# Patient Record
Sex: FEMALE | Race: BLACK OR AFRICAN AMERICAN | Employment: FULL TIME | URBAN - METROPOLITAN AREA
[De-identification: names, ages, dates, MRNs, and addresses within clinical notes are randomized per-mention and may not be internally consistent; named-entity substitution may affect disease eponyms.]

---

## 2021-07-02 LAB
ANTIBODY SCREEN, EXTERNAL: NEGATIVE
HBSAG, EXTERNAL: NONREACTIVE
HEPATITIS C AB,   EXT: NONREACTIVE
HIV, EXTERNAL: NONREACTIVE
RUBELLA, EXTERNAL: NORMAL
T. PALLIDUM, EXTERNAL: NEGATIVE
TYPE, ABO & RH, EXTERNAL: NORMAL

## 2021-09-02 LAB
CHLAMYDIA, EXTERNAL: NEGATIVE
N. GONORRHEA, EXTERNAL: NEGATIVE

## 2021-11-02 ENCOUNTER — HOSPITAL ENCOUNTER (EMERGENCY)
Age: 36
End: 2021-11-02
Attending: OBSTETRICS & GYNECOLOGY | Admitting: OBSTETRICS & GYNECOLOGY

## 2021-11-02 ENCOUNTER — HOSPITAL ENCOUNTER (EMERGENCY)
Age: 36
Discharge: HOME | End: 2021-11-02
Attending: OBSTETRICS & GYNECOLOGY | Admitting: OBSTETRICS & GYNECOLOGY
Payer: COMMERCIAL

## 2021-11-02 VITALS
SYSTOLIC BLOOD PRESSURE: 151 MMHG | TEMPERATURE: 98.7 F | HEART RATE: 122 BPM | BODY MASS INDEX: 35.68 KG/M2 | WEIGHT: 189 LBS | RESPIRATION RATE: 17 BRPM | DIASTOLIC BLOOD PRESSURE: 94 MMHG | HEIGHT: 61 IN

## 2021-11-02 PROBLEM — O16.9 HYPERTENSION AFFECTING PREGNANCY: Status: ACTIVE | Noted: 2021-11-02

## 2021-11-02 LAB
ALBUMIN SERPL-MCNC: 3.1 G/DL (ref 3.5–5)
ALBUMIN/GLOB SERPL: 0.9 {RATIO} (ref 1.1–2.2)
ALP SERPL-CCNC: 98 U/L (ref 45–117)
ALT SERPL-CCNC: 18 U/L (ref 12–78)
ANION GAP SERPL CALC-SCNC: 7 MMOL/L (ref 5–15)
AST SERPL-CCNC: 10 U/L (ref 15–37)
BILIRUB SERPL-MCNC: 0.5 MG/DL (ref 0.2–1)
BUN SERPL-MCNC: 3 MG/DL (ref 6–20)
BUN/CREAT SERPL: 6 (ref 12–20)
CALCIUM SERPL-MCNC: 9.9 MG/DL (ref 8.5–10.1)
CHLORIDE SERPL-SCNC: 106 MMOL/L (ref 97–108)
CO2 SERPL-SCNC: 24 MMOL/L (ref 21–32)
CREAT SERPL-MCNC: 0.48 MG/DL (ref 0.55–1.02)
CREAT UR-MCNC: 14.2 MG/DL
ERYTHROCYTE [DISTWIDTH] IN BLOOD BY AUTOMATED COUNT: 13.1 % (ref 11.5–14.5)
GLOBULIN SER CALC-MCNC: 3.4 G/DL (ref 2–4)
GLUCOSE SERPL-MCNC: 134 MG/DL (ref 65–100)
HCT VFR BLD AUTO: 34.9 % (ref 35–47)
HGB BLD-MCNC: 11.7 G/DL (ref 11.5–16)
MCH RBC QN AUTO: 30.2 PG (ref 26–34)
MCHC RBC AUTO-ENTMCNC: 33.5 G/DL (ref 30–36.5)
MCV RBC AUTO: 89.9 FL (ref 80–99)
NRBC # BLD: 0 K/UL (ref 0–0.01)
NRBC BLD-RTO: 0 PER 100 WBC
PLATELET # BLD AUTO: 361 K/UL (ref 150–400)
PMV BLD AUTO: 9.8 FL (ref 8.9–12.9)
POTASSIUM SERPL-SCNC: 3.3 MMOL/L (ref 3.5–5.1)
PROT SERPL-MCNC: 6.5 G/DL (ref 6.4–8.2)
PROT UR-MCNC: 6 MG/DL (ref 0–11.9)
PROT/CREAT UR-RTO: 0.4
RBC # BLD AUTO: 3.88 M/UL (ref 3.8–5.2)
SODIUM SERPL-SCNC: 137 MMOL/L (ref 136–145)
WBC # BLD AUTO: 10 K/UL (ref 3.6–11)

## 2021-11-02 PROCEDURE — 85027 COMPLETE CBC AUTOMATED: CPT

## 2021-11-02 PROCEDURE — 36415 COLL VENOUS BLD VENIPUNCTURE: CPT

## 2021-11-02 PROCEDURE — 84156 ASSAY OF PROTEIN URINE: CPT

## 2021-11-02 PROCEDURE — 74011250637 HC RX REV CODE- 250/637: Performed by: OBSTETRICS & GYNECOLOGY

## 2021-11-02 PROCEDURE — 99285 EMERGENCY DEPT VISIT HI MDM: CPT

## 2021-11-02 PROCEDURE — 80053 COMPREHEN METABOLIC PANEL: CPT

## 2021-11-02 RX ORDER — LABETALOL HYDROCHLORIDE 5 MG/ML
20 INJECTION, SOLUTION INTRAVENOUS ONCE
Status: DISCONTINUED | OUTPATIENT
Start: 2021-11-02 | End: 2021-11-02 | Stop reason: HOSPADM

## 2021-11-02 RX ORDER — NIFEDIPINE 30 MG/1
60 TABLET, EXTENDED RELEASE ORAL DAILY
COMMUNITY
End: 2021-11-02

## 2021-11-02 RX ORDER — NIFEDIPINE 90 MG/1
90 TABLET, EXTENDED RELEASE ORAL DAILY
Qty: 30 TABLET | Refills: 2 | Status: SHIPPED
Start: 2021-11-02 | End: 2021-11-30

## 2021-11-02 RX ORDER — GUAIFENESIN 100 MG/5ML
81 LIQUID (ML) ORAL DAILY
COMMUNITY
End: 2022-01-03

## 2021-11-02 RX ORDER — ACETAMINOPHEN 325 MG/1
650 TABLET ORAL
COMMUNITY
End: 2021-11-30

## 2021-11-02 RX ORDER — LORATADINE AND PSEUDOEPHEDRINE SULFATE 10; 240 MG/1; MG/1
1 TABLET, EXTENDED RELEASE ORAL DAILY
COMMUNITY
End: 2022-01-03

## 2021-11-02 RX ORDER — NIFEDIPINE 30 MG/1
30 TABLET, EXTENDED RELEASE ORAL
Status: COMPLETED | OUTPATIENT
Start: 2021-11-02 | End: 2021-11-02

## 2021-11-02 RX ADMIN — NIFEDIPINE 30 MG: 30 TABLET, FILM COATED, EXTENDED RELEASE ORAL at 13:25

## 2021-11-02 NOTE — PROGRESS NOTES
Labs reviewed and look good (ur pr/cr ratio at baseline). BP have been labile, some normal, most mild range, few severe range which then return to mild range. Pt denies HA or visual changed. Will add 30mg of Procardia XL to regimen in form of now dose. Continue to monitor BPs. Janelle Donis MD    BPs improved on procardia. Discharged home with f/u later this week in the office.

## 2021-11-02 NOTE — PROGRESS NOTES
1705 pt discharged home at this time. Instructions given on medication change and follow up. Pt states understanding.

## 2021-11-02 NOTE — PROGRESS NOTES
1226 Patient arrived for monitoring b/c of high blood pressures noted at Dr. Jimmy Kirkpatrick office. 1325 Per Dr Magalie Stevenson, administering 30mg additional nifedipine for elevated BP    1515 Notified Dr. Magalie Stevenson of contractions present. BP's continue to be unpredictable. 1630 Dr Mejias Sensor bedside. Plan is to increase Procardia dosage and recheck BP in office in 48hr. Patient to be discharged.

## 2021-11-02 NOTE — DISCHARGE INSTRUCTIONS
Patient Education   Patient Education        Preeclampsia: Care Instructions  Overview     Preeclampsia occurs when a woman's blood pressure rises during pregnancy. Often with preeclampsia, you also have swelling in your legs, hands, and face. A test may show too much protein in your urine. If preeclampsia is severe and not treated, it can lead to seizures (eclampsia) and damage to your liver or kidneys. Preeclampsia can prevent your baby from getting enough food and oxygen. This can cause a low birth weight or other problems. Your doctor will watch you closely to prevent these problems. Your doctor also may recommend that you reduce your activity. If your preeclampsia is a danger to your health or the health of your baby, your doctor may need to deliver your baby early. While preeclampsia is a concern, most women with preeclampsia have healthy babies. After a woman gives birth, preeclampsia usually goes away on its own. But symptoms may last a few weeks or more and can get worse after delivery. Rarely, symptoms of preeclampsia don't show up until days or even weeks after childbirth. Follow-up care is a key part of your treatment and safety. Be sure to make and go to all appointments, and call your doctor if you are having problems. It's also a good idea to know your test results and keep a list of the medicines you take. How can you care for yourself at home? · Take and record your blood pressure at home if your doctor tells you to. ? Ask your doctor to check your blood pressure monitor to be sure that it is accurate and that the cuff fits you. Also ask your doctor to watch you to make sure that you are using it right. ? You should not eat, use tobacco products, or use medicine known to raise blood pressure (such as some nasal decongestant sprays) before you take your blood pressure. ? Avoid taking your blood pressure if you have just exercised. Also avoid taking it if you are nervous or upset.  Rest at least 15 minutes before you take your blood pressure. · You may need to take medicine to manage your blood pressure. Take your medicines exactly as prescribed. Call your doctor if you think you are having a problem with your medicine. · Do not smoke. Quitting smoking will help improve your baby's growth and health. If you need help quitting, talk to your doctor about stop-smoking programs and medicines. These can increase your chances of quitting for good. · Eat a balanced and healthy diet that has lots of fruits and vegetables. · You can keep track of your baby's health by checking your baby's movement. A common method for this is to note the length of time it takes to count 10 movements (such as kicks, flutters, or rolls). Call your doctor if you don't feel at least 10 movements in a 2-hour period. Track your baby's movements once each day. Bring this record with you to each prenatal visit. When should you call for help? Share this information with your partner or a friend. They can help you watch for warning signs. Call 911  anytime you think you may need emergency care. For example, call if:    · You passed out (lost consciousness).     · You have a seizure. Call your doctor now or seek immediate medical care if:    · You have symptoms of preeclampsia, such as:  ? Sudden swelling of your face, hands, or feet. ? New vision problems (such as dimness, blurring, or seeing spots). ? A severe headache.     · Your blood pressure is very high, such as 160/110 or higher.     · Your blood pressure is higher than your doctor told you it should be, or it rises quickly.     · You have new nausea or vomiting.     · You think that you are in labor.     · You have pain in your belly or pelvis. Watch closely for changes in your health, and be sure to contact your doctor if:    · You gain weight rapidly. Where can you learn more?   Go to http://www.gray.com/  Enter Z954 in the search box to learn more about \"Preeclampsia: Care Instructions. \"  Current as of: June 16, 2021               Content Version: 13.0  © 8795-1503 [employer]. Care instructions adapted under license by [employer] (which disclaims liability or warranty for this information). If you have questions about a medical condition or this instruction, always ask your healthcare professional. [address] any warranty or liability for your use of this information. High Blood Pressure in Pregnancy: Care Instructions  Overview     High blood pressure (hypertension) means that the force of blood against your artery walls is too strong. High blood pressure problems during pregnancy include:  · Chronic hypertension. This is high blood pressure that starts before pregnancy. · Gestational hypertension. This is high blood pressure that starts in the second or third trimester of pregnancy. · Preeclampsia. This is a problem that includes high blood pressure and signs of organ injury during pregnancy. In some cases, it leads to eclampsia. Eclampsia causes seizures. High blood pressure during pregnancy can affect the amount of oxygen and nutrients your baby receives. This can affect how your baby grows. High blood pressure can also cause other serious problems for both you and your baby, such as placental abruption. To prevent problems, you and your baby will be watched very closely. You will have to check your blood pressure often during pregnancy and possibly after pregnancy. If your blood pressure rises suddenly or is very high during your pregnancy, your doctor may prescribe medicines. They can usually control blood pressure. If your blood pressure affects your or your baby's health, your doctor may need to deliver your baby early. After your baby is born, your blood pressure will probably improve. But sometimes blood pressure problems continue after birth.   Follow-up care is a key part of your treatment and safety. Be sure to make and go to all appointments, and call your doctor if you are having problems. It's also a good idea to know your test results and keep a list of the medicines you take. How can you care for yourself at home? · Take and write down your blood pressure at home if your doctor says to. · Take your medicines exactly as prescribed. Call your doctor if you think you are having a problem with your medicine. · Do not smoke. This is one of the best things you can do to help your baby be healthy. If you need help quitting, talk to your doctor about stop-smoking programs and medicines. These can increase your chances of quitting for good. · Don't gain too much weight during pregnancy. Talk to your doctor about how much weight gain is healthy. · Eat a healthy diet. · If your doctor says it's okay, get regular exercise. Walking or swimming several times a week can be healthy for you and your baby. · Reduce stress, and find time to relax. This is very important if you continue to work or have a busy schedule. It's also important if you have small children at home. When should you call for help? Share this information with your partner or a friend. They can help you watch for warning signs. Call 911  anytime you think you may need emergency care. For example, call if:    · You passed out (lost consciousness).     · You have a seizure. Call your doctor now or seek immediate medical care if:    · You have symptoms of preeclampsia, such as:  ? Sudden swelling of your face, hands, or feet. ? New vision problems (such as dimness, blurring, or seeing spots). ? A severe headache.     · Your blood pressure is very high, such as 160/110 or higher.     · Your blood pressure is higher than your doctor told you it should be, or it rises quickly.     · You have new nausea or vomiting.     · You think that you are in labor.     · You have pain in your belly or pelvis.    Watch closely for changes in your health, and be sure to contact your doctor if:    · You gain weight rapidly. Where can you learn more? Go to http://www.gray.com/  Enter A052 in the search box to learn more about \"High Blood Pressure in Pregnancy: Care Instructions. \"  Current as of: June 16, 2021               Content Version: 13.0  © 4275-9921 [employer]. Care instructions adapted under license by [email or web address] (which disclaims liability or warranty for this information). If you have questions about a medical condition or this instruction, always ask your healthcare professional. [place] [address] any warranty or liability for your use of this information.

## 2021-11-02 NOTE — H&P
History & Physical    Name: Mindy Vicente MRN: [id number]  SSN: [id number]    YOB: 1985  Age: 31 y.o. Sex: female      Subjective:     Reason for Admission:  Pregnancy and chronic hypertension with elevated BPs in the office    History of Present Illness: Mindy Vicente is a 31 y.o.  with an estimated gestational age of 29 5/7 weeks. She was seen in the office today for her routine prenatal visit and was found to have BPs that were higher than normal for her. She denies HA, visual changes or RUQ discomfort. She was diagnosed with CHTN earlier in pregnancy and was started on procardia, currently on Procardia XL 60mg daily. She also takes a baby ASA. Her baseline labs were abnormal at 16 wks (urine pr/cr ratio 0.44 at baseline). She had an ultrasound yesterday which demonstrated normal fetal growth and a BPP 8/8, cephalic presentation. She presents today for repeat labs and BPs and r/o SI PreE. She notes active FM. She has no other pregnancy issues. She is covid vaccinated. OB History    Para Term  AB Living   1             SAB TAB Ectopic Molar Multiple Live Births                    # Outcome Date GA Lbr Jose/2nd Weight Sex Delivery Anes PTL Lv   1 Current              Past Medical History:   Diagnosis Date    Essential hypertension     Gestational hypertension      No past surgical history on file. Social History     Occupational History    Not on file   Tobacco Use    Smoking status: Never Smoker    Smokeless tobacco: Never Used   Vaping Use    Vaping Use: Never used   Substance and Sexual Activity    Alcohol use: Not Currently     Alcohol/week: 0.0 - 0.8 standard drinks    Drug use: No    Sexual activity: Yes     No family history on file. Allergies   Allergen Reactions    Sulfa (Sulfonamide Antibiotics) Hives, Itching and Myalgia     Prior to Admission medications    Medication Sig Start Date End Date Taking?  Authorizing Provider NIFEdipine ER (Procardia XL) 30 mg ER tablet Take 60 mg by mouth daily. Indications: high blood pressure, pre-existing hypertension during pregnancy   Yes Provider, Historical   loratadine-pseudoephedrine (Claritin-D 24 Hour)  mg per tablet Take 1 Tablet by mouth daily. Indications: seasonal runny nose   Yes Provider, Historical   aspirin 81 mg chewable tablet Take 81 mg by mouth daily. Indications: treatment to prevent peripheral artery thromboembolism   Yes Provider, Historical   acetaminophen (TylenoL) 325 mg tablet Take 650 mg by mouth every four (4) hours as needed for Pain. Indications: headache   Yes Provider, Historical   PNV Comb #2-Iron-FA-Omega 3 29-1-400 mg cmpk Take  by mouth. Yes Provider, Historical   norethindrone-ethinyl estradiol (GILDESS FE) 1 mg-20 mcg (21)/75 mg (7) per tablet Take 1 Tab by mouth daily. Patient not taking: Reported on 2021 6/12/15   Myra Ram MD   clindamycin-benzoyl peroxide (BENZACLIN) 1-5 % topical gel Apply  to affected area two (2) times a day. Apply to affected area after the skin has been cleansed and dried.   Patient not taking: Reported on 2021   Myra Ram MD        Review of Systems   As noted in HPI    Objective:     Vitals:    Vitals:    21 1126 21 1127 21 1136 21 1146   BP: (!) 156/99  (!) 152/85 (!) 131/94   Pulse: (!) 115      Resp: 17      Temp: 98.7 °F (37.1 °C)      Weight:  85.7 kg (189 lb)     Height:  5' 1\" (1.549 m)        Temp (24hrs), Av.7 °F (37.1 °C), Min:98.7 °F (37.1 °C), Max:98.7 °F (37.1 °C)    BP  Min: 131/94  Max: 156/99     Physical Exam  Gen: NAD  Resp: non-labored  CV: well perfused  Abd: soft, gravid, non-tender  Ext: 2+ reflexes, no clonus, no edema       Baseline  140, moderate variability, + accels, no decels - REACTIVE  [place]: no contractions      Labs:   Recent Results (from the past 24 hour(s))   CBC W/O DIFF    Collection Time: 21 11:39 AM   Result Value Ref Range WBC 10.0 3.6 - 11.0 K/uL    RBC 3.88 3.80 - 5.20 M/uL    HGB 11.7 11.5 - 16.0 g/dL    HCT 34.9 (L) 35.0 - 47.0 %    MCV 89.9 80.0 - 99.0 FL    MCH 30.2 26.0 - 34.0 PG    MCHC 33.5 30.0 - 36.5 g/dL    RDW 13.1 11.5 - 14.5 %    PLATELET 996 458 - 049 K/uL    MPV 9.8 8.9 - 12.9 FL    NRBC 0.0 0  WBC    ABSOLUTE NRBC 0.00 0.00 - 0.01 K/uL       Assessment and Plan:     G1 28 5/7 with CHTN and elevated BPs in the office, here to r/o SI PreE    - send PreE labs (baseline urine pr/cr ratio elevated at 16 wks 0.44)  - serial BPs  - NST  - monitor for si/sx of SI PreE, currently no sxs  - await return of labs    Molly Ambriz MD

## 2021-11-05 ENCOUNTER — HOSPITAL ENCOUNTER (INPATIENT)
Age: 36
LOS: 20 days | Discharge: HOME | DRG: 832 | End: 2021-11-30
Attending: OBSTETRICS & GYNECOLOGY | Admitting: OBSTETRICS & GYNECOLOGY
Payer: COMMERCIAL

## 2021-11-05 DIAGNOSIS — O99.213 OBESITY AFFECTING PREGNANCY IN THIRD TRIMESTER: ICD-10-CM

## 2021-11-05 DIAGNOSIS — Z3A.29 29 WEEKS GESTATION OF PREGNANCY: Primary | ICD-10-CM

## 2021-11-05 DIAGNOSIS — O24.410 DIET CONTROLLED GESTATIONAL DIABETES MELLITUS (GDM) IN THIRD TRIMESTER: ICD-10-CM

## 2021-11-05 DIAGNOSIS — O10.919 CHRONIC HYPERTENSION AFFECTING PREGNANCY: ICD-10-CM

## 2021-11-05 LAB
ALBUMIN SERPL-MCNC: 3.3 G/DL (ref 3.5–5)
ALBUMIN/GLOB SERPL: 0.8 {RATIO} (ref 1.1–2.2)
ALP SERPL-CCNC: 107 U/L (ref 45–117)
ALT SERPL-CCNC: 29 U/L (ref 12–78)
ANION GAP SERPL CALC-SCNC: 12 MMOL/L (ref 5–15)
AST SERPL-CCNC: 19 U/L (ref 15–37)
BILIRUB SERPL-MCNC: 0.5 MG/DL (ref 0.2–1)
BUN SERPL-MCNC: 5 MG/DL (ref 6–20)
BUN/CREAT SERPL: 10 (ref 12–20)
CALCIUM SERPL-MCNC: 10 MG/DL (ref 8.5–10.1)
CHLORIDE SERPL-SCNC: 103 MMOL/L (ref 97–108)
CO2 SERPL-SCNC: 22 MMOL/L (ref 21–32)
CREAT SERPL-MCNC: 0.5 MG/DL (ref 0.55–1.02)
CREAT UR-MCNC: 14.5 MG/DL
ERYTHROCYTE [DISTWIDTH] IN BLOOD BY AUTOMATED COUNT: 13.1 % (ref 11.5–14.5)
GLOBULIN SER CALC-MCNC: 3.9 G/DL (ref 2–4)
GLUCOSE SERPL-MCNC: 104 MG/DL (ref 65–100)
HCT VFR BLD AUTO: 35.9 % (ref 35–47)
HGB BLD-MCNC: 12.1 G/DL (ref 11.5–16)
MCH RBC QN AUTO: 30.3 PG (ref 26–34)
MCHC RBC AUTO-ENTMCNC: 33.7 G/DL (ref 30–36.5)
MCV RBC AUTO: 89.8 FL (ref 80–99)
NRBC # BLD: 0 K/UL (ref 0–0.01)
NRBC BLD-RTO: 0 PER 100 WBC
PLATELET # BLD AUTO: 393 K/UL (ref 150–400)
PMV BLD AUTO: 9.9 FL (ref 8.9–12.9)
POTASSIUM SERPL-SCNC: 3.2 MMOL/L (ref 3.5–5.1)
PROT SERPL-MCNC: 7.2 G/DL (ref 6.4–8.2)
PROT UR-MCNC: 10 MG/DL (ref 0–11.9)
PROT/CREAT UR-RTO: 0.7
RBC # BLD AUTO: 4 M/UL (ref 3.8–5.2)
SODIUM SERPL-SCNC: 137 MMOL/L (ref 136–145)
WBC # BLD AUTO: 10.9 K/UL (ref 3.6–11)

## 2021-11-05 PROCEDURE — 99282 EMERGENCY DEPT VISIT SF MDM: CPT

## 2021-11-05 PROCEDURE — 84156 ASSAY OF PROTEIN URINE: CPT

## 2021-11-05 PROCEDURE — 85027 COMPLETE CBC AUTOMATED: CPT

## 2021-11-05 PROCEDURE — 36415 COLL VENOUS BLD VENIPUNCTURE: CPT

## 2021-11-05 PROCEDURE — 80053 COMPREHEN METABOLIC PANEL: CPT

## 2021-11-06 PROBLEM — Z3A.29 29 WEEKS GESTATION OF PREGNANCY: Status: ACTIVE | Noted: 2021-11-06

## 2021-11-06 PROBLEM — O10.919 CHRONIC HYPERTENSION AFFECTING PREGNANCY: Status: ACTIVE | Noted: 2021-11-06

## 2021-11-06 PROCEDURE — G0378 HOSPITAL OBSERVATION PER HR: HCPCS

## 2021-11-06 PROCEDURE — 74011250637 HC RX REV CODE- 250/637: Performed by: ADVANCED PRACTICE MIDWIFE

## 2021-11-06 PROCEDURE — 74011250637 HC RX REV CODE- 250/637: Performed by: OBSTETRICS & GYNECOLOGY

## 2021-11-06 RX ORDER — GUAIFENESIN 100 MG/5ML
LIQUID (ML) ORAL
Status: DISPENSED
Start: 2021-11-06 | End: 2021-11-06

## 2021-11-06 RX ORDER — SODIUM CHLORIDE 0.9 % (FLUSH) 0.9 %
5-40 SYRINGE (ML) INJECTION AS NEEDED
Status: DISCONTINUED | OUTPATIENT
Start: 2021-11-06 | End: 2021-11-06

## 2021-11-06 RX ORDER — NIFEDIPINE 30 MG/1
90 TABLET, EXTENDED RELEASE ORAL DAILY
Status: DISCONTINUED | OUTPATIENT
Start: 2021-11-06 | End: 2021-11-09

## 2021-11-06 RX ORDER — GUAIFENESIN 100 MG/5ML
81 LIQUID (ML) ORAL DAILY
Status: DISCONTINUED | OUTPATIENT
Start: 2021-11-06 | End: 2021-11-30 | Stop reason: HOSPADM

## 2021-11-06 RX ORDER — ACETAMINOPHEN 325 MG/1
650 TABLET ORAL
Status: DISCONTINUED | OUTPATIENT
Start: 2021-11-06 | End: 2021-11-30 | Stop reason: HOSPADM

## 2021-11-06 RX ORDER — LABETALOL 200 MG/1
200 TABLET, FILM COATED ORAL 2 TIMES DAILY
Status: DISCONTINUED | OUTPATIENT
Start: 2021-11-06 | End: 2021-11-09

## 2021-11-06 RX ORDER — SODIUM CHLORIDE 0.9 % (FLUSH) 0.9 %
5-40 SYRINGE (ML) INJECTION EVERY 8 HOURS
Status: DISCONTINUED | OUTPATIENT
Start: 2021-11-06 | End: 2021-11-12

## 2021-11-06 RX ADMIN — ASPIRIN 81 MG CHEWABLE TABLET 81 MG: 81 TABLET CHEWABLE at 06:41

## 2021-11-06 RX ADMIN — LABETALOL HYDROCHLORIDE 200 MG: 200 TABLET, FILM COATED ORAL at 12:20

## 2021-11-06 RX ADMIN — NIFEDIPINE 90 MG: 30 TABLET, FILM COATED, EXTENDED RELEASE ORAL at 06:42

## 2021-11-06 RX ADMIN — LABETALOL HYDROCHLORIDE 200 MG: 200 TABLET, FILM COATED ORAL at 18:48

## 2021-11-06 NOTE — PROGRESS NOTES
1600 Verbal shift change report given to Charanjit Ramirez RN (oncoming nurse) by Panfilo Lee RN (offgoing nurse). Report included the following information SBAR.

## 2021-11-06 NOTE — PROGRESS NOTES
Ante Partum Progress Note    Dana Woody  [id number]    Assessment: 32 y/o  with IUP at 29w2d with CHTN and SI preE w/o SF. 1. CHTN with SI PreE w/o SF  -Labile BPs with increase in requirement for antihypertensives. -Currently on procardia 90 xl daily  -No preE sx and labs stable except increase in proteinuria (baseline p/c .44, now .7)  -Will add labetalol 200 mg bid  -Long discussion about dx  -We discussed inpatient admission with daily labs while adjusting BP meds with plan for betamethasone for [facility] tomorrow (after 3 hour GTT) and MFM consult on Monday. Patient and her  strongly desire to go home so we reviewed that if BPs mild range and she remains asymptomatic we could consider d/c today with office MFM visit on Monday and plan outpatient corticosteroids. We discussed strong likelihood of  delivery given what her BPs have been doing and we reviewed implications of this in detail. After careful discussion, mutual decision was made to stay in house with plan as above. 2. IUP at 29 2/7 wks  -Normal growth on  with reassuring fetal surveillance  -NST bid  -Recommend BMZ after GTT    3. Failed 1 hour GTT  -3 hour GTT to be done    4. BMI 35      Orders/Charges: High and Non Stress Test    Patient states she is feeling well. She denies HA, visual changes, RUQ pain, ctx, vb, lof. +FM.      Vitals:  Visit Vitals  BP (!) 153/108 (BP 1 Location: Right upper arm, BP Patient Position: At rest)   Pulse (!) 101   Temp 98.3 °F (36.8 °C)   Resp 18     Temp (24hrs), Av.5 °F (36.9 °C), Min:98.3 °F (36.8 °C), Max:98.9 °F (37.2 °C)      Last 24hr Input/Output:  No intake or output data in the 24 hours ending 21 1019     Non stress test:  Reactive    Patient Vitals for the past 4 hrs:   Fetal Activity   21 0824 Present   21 0620 Present      Patient Vitals for the past 4 hrs:   Fetal Activity   21 0824 Present   21 9480 Present        Uterine Activity: None Exam:  Patient without distress. Abdomen, fundus soft non-tender     Extremities, no redness or tenderness               Additional Exam: Deferred    Labs:     Lab Results   Component Value Date/Time    WBC 10.9 11/05/2021 11:17 PM    WBC 10.0 11/02/2021 11:39 AM    HGB 12.1 11/05/2021 11:17 PM    HGB 11.7 11/02/2021 11:39 AM    HCT 35.9 11/05/2021 11:17 PM    HCT 34.9 (L) 11/02/2021 11:39 AM    PLATELET 713 67/59/9947 11:17 PM    PLATELET 219 61/46/1470 11:39 AM       Recent Results (from the past 24 hour(s))   CBC W/O DIFF    Collection Time: 11/05/21 11:17 PM   Result Value Ref Range    WBC 10.9 3.6 - 11.0 K/uL    RBC 4.00 3.80 - 5.20 M/uL    HGB 12.1 11.5 - 16.0 g/dL    HCT 35.9 35.0 - 47.0 %    MCV 89.8 80.0 - 99.0 FL    MCH 30.3 26.0 - 34.0 PG    MCHC 33.7 30.0 - 36.5 g/dL    RDW 13.1 11.5 - 14.5 %    PLATELET 613 309 - 685 K/uL    MPV 9.9 8.9 - 12.9 FL    NRBC 0.0 0  WBC    ABSOLUTE NRBC 0.00 0.00 - 1.43 K/uL   METABOLIC PANEL, COMPREHENSIVE    Collection Time: 11/05/21 11:17 PM   Result Value Ref Range    Sodium 137 136 - 145 mmol/L    Potassium 3.2 (L) 3.5 - 5.1 mmol/L    Chloride 103 97 - 108 mmol/L    CO2 22 21 - 32 mmol/L    Anion gap 12 5 - 15 mmol/L    Glucose 104 (H) 65 - 100 mg/dL    BUN 5 (L) 6 - 20 MG/DL    Creatinine 0.50 (L) 0.55 - 1.02 MG/DL    BUN/Creatinine ratio 10 (L) 12 - 20      GFR est AA >60 >60 ml/min/1.73m2    GFR est non-AA >60 >60 ml/min/1.73m2    Calcium 10.0 8.5 - 10.1 MG/DL    Bilirubin, total 0.5 0.2 - 1.0 MG/DL    ALT (SGPT) 29 12 - 78 U/L    AST (SGOT) 19 15 - 37 U/L    Alk. phosphatase 107 45 - 117 U/L    Protein, total 7.2 6.4 - 8.2 g/dL    Albumin 3.3 (L) 3.5 - 5.0 g/dL    Globulin 3.9 2.0 - 4.0 g/dL    A-G Ratio 0.8 (L) 1.1 - 2.2     PROTEIN/CREATININE RATIO, URINE    Collection Time: 11/05/21 11:17 PM   Result Value Ref Range    Protein, urine random 10 0.0 - 11.9 mg/dL    Creatinine, urine 14.50 mg/dL    Protein/Creat.  urine Ratio 0.7

## 2021-11-06 NOTE — H&P
History and Physical  2021    Patient is a 31 y.o.  at 29w1d with hany 22 who presents to the [facility] with c/o severe range blood pressure at home at 2243. She reports she was seen on L&D for severe range BP in office on  and had a BP check in the office today and reports her first two Bps severe range, 160/98 and then 3rd was mild range, 148/94. Labs were sent today in the office, but they are not resulted. She reports she checked her BP at home and it was 168/101, 160/100, 155/90s. She reports good FM. Denies contractions, VB, LOF, n/v/d, fever, cough, sore throat, SOB/difficulty breathing, loss of taste/smell, exposure to anyone with COVID, h/a, changes in vision, RUQ/epigastric pain. She reports she has another BP check scheduled for Tuesday.      She reports prenatal care with Dr. Maylin Steward c/b  Elevated 1hr GCT, 3 hour scheduled this week  CHTN- dx at 16w, baseline preE labs wnl except p/c 0.44, baby ASA, Procardia 90mg XL- just increased from 60mg on 10/2  Obesity         PNC: Blood type: O            RH: pos            Hep B: negative            Rubella: immune            GBS status: unknown            HIV: non reactive            RPR/TPA/VDRL: negative            GC/CT: negative            HSV serology: not noted on prenatal records, but patient denies any hx of HSV     The history is provided by the patient and the spouse. OBHX:   OB History        1    Para        Term                AB        Living           SAB        IAB        Ectopic        Molar        Multiple        Live Births                    PMH:   Past Medical History:   Diagnosis Date    Essential hypertension     Gestational hypertension     Migraines        PSH: History reviewed. No pertinent surgical history. OB/GYN:  29w2d with hany 22. See above    Meds:   Prior to Admission Medications   Prescriptions Last Dose Informant Patient Reported? Taking?    NIFEdipine ER (Procardia XL) 90 mg ER tablet No No   Sig: Take 1 Tablet by mouth daily. PNV Comb #2-Iron-FA-Omega 3 29-1-400 mg cmpk   Yes No   Sig: Take  by mouth. acetaminophen (TylenoL) 325 mg tablet   Yes No   Sig: Take 650 mg by mouth every four (4) hours as needed for Pain. Indications: headache   aspirin 81 mg chewable tablet   Yes No   Sig: Take 81 mg by mouth daily. Indications: treatment to prevent peripheral artery thromboembolism   clindamycin-benzoyl peroxide (BENZACLIN) 1-5 % topical gel   No No   Sig: Apply  to affected area two (2) times a day. Apply to affected area after the skin has been cleansed and dried. Patient not taking: Reported on 11/2/2021   loratadine-pseudoephedrine (Claritin-D 24 Hour)  mg per tablet   Yes No   Sig: Take 1 Tablet by mouth daily. Indications: seasonal runny nose      Facility-Administered Medications: None       Allergies: Allergies   Allergen Reactions    Sulfa (Sulfonamide Antibiotics) Hives, Itching and Myalgia       Pertinent ROS: Review of Systems - Negative except noted in HPI  Constitutional: Negative for chills and fever. Eyes: Negative for visual disturbance. Respiratory: Negative for cough and shortness of breath. Cardiovascular: Negative for chest pain and leg swelling. Gastrointestinal: Negative for abdominal pain, diarrhea, nausea and vomiting. Genitourinary: Negative for vaginal bleeding and vaginal discharge. Musculoskeletal: Negative for gait problem. Neurological: Negative for speech difficulty and headaches. All other systems reviewed and are negative.     [address]:   Family History   Problem Relation Age of Onset   [employer] Hypertension Mother     Diabetes Paternal Grandmother        SH:   Social History     Socioeconomic History    Marital status:      Spouse name: Not on file    Number of children: Not on file    Years of education: Not on file    Highest education level: Not on file   Occupational History    Not on file   Tobacco Use    Smoking status: Never Smoker    Smokeless tobacco: Never Used   Vaping Use    Vaping Use: Never used   Substance and Sexual Activity    Alcohol use: Not Currently     Alcohol/week: 0.0 - 0.8 standard drinks    Drug use: No    Sexual activity: Yes   Other Topics Concern     Service Not Asked    Blood Transfusions Not Asked    Caffeine Concern Not Asked    Occupational Exposure Not Asked    Hobby Hazards Not Asked    Sleep Concern Not Asked    Stress Concern Not Asked    Weight Concern Not Asked    Special Diet Not Asked    Back Care Not Asked    Exercise Not Asked    Bike Helmet Not Asked    Seat Belt Not Asked    Self-Exams Not Asked   Social History Narrative    Not on file     Social Determinants of Health     Financial Resource Strain:     Difficulty of Paying Living Expenses: Not on file   Food Insecurity:     Worried About Running Out of Food in the Last Year: Not on file    [place] of Food in the Last Year: Not on file   Transportation Needs:     Lack of Transportation (Medical): Not on file    Lack of Transportation (Non-Medical):  Not on file   Physical Activity:     Days of Exercise per Week: Not on file    Minutes of Exercise per Session: Not on file   Stress:     Feeling of Stress : Not on file   Social Connections:     Frequency of Communication with Friends and Family: Not on file    Frequency of Social Gatherings with Friends and Family: Not on file    Attends Zoroastrianism Services: Not on file    Active Member of Clubs or Organizations: Not on file    Attends Club or Organization Meetings: Not on file    Marital Status: Not on file   Intimate Partner Violence:     Fear of Current or Ex-Partner: Not on file    Emotionally Abused: Not on file    Physically Abused: Not on file    Sexually Abused: Not on file   Housing Stability:     Unable to Pay for Housing in the Last Year: Not on file    Number of [place] in the Last Year: Not on file    Unstable Housing in the Last Year: Not on file   Denies tobacco, alcohol, illicits  Denies hx STIs    OBJECTIVE:    Temp (24hrs), Av.9 °F (37.2 °C), Min:98.9 °F (37.2 °C), Max:98.9 °F (37.2 °C)    Visit Vitals  /84   Pulse (!) 109   Temp 98.9 °F (37.2 °C)   Resp 16     Patient Vitals for the past 12 hrs:   Temp Pulse Resp BP   21 0051  (!) 117  127/83   21 0036  (!) 121  135/89   21 0021  (!) 123  135/88   21 0006  (!) 109  133/84   21 2351  (!) 112  131/89   21 2335  (!) 110  (!) 141/97   21 2320  (!) 108  134/79   21 2303  (!) 128  (!) 156/104   21 2248 98.9 °F (37.2 °C) (!) 118 16 (!) 149/103       Physical Exam  Vitals and nursing note reviewed. Constitutional:       General: She is awake. She is not in acute distress. Appearance: Normal appearance. She is well-developed, well-groomed and normal weight. Comments: Comfortable resting in bed, smiling, partner at bedside   HENT:      Head: Normocephalic. Nose: Nose normal.   Cardiovascular:      Rate and Rhythm: Normal rate and regular rhythm. Pulses: Normal pulses. Pulmonary:      Effort: Pulmonary effort is normal. No respiratory distress. Comments: Breathing easy and unlabored  Abdominal:      Tenderness: There is no abdominal tenderness. Comments: Gravid c/w 29w  FHTs: 150s, +accels, neg decels, moderate variability- active fetal movement noted and heard on monitor- very difficult to keep on monitor- patient sitting up in bed and was picking up maternal heart rate at beginning of tracing per RN  [place]: none   Genitourinary:     Comments: Deferred, no labor complaints  Musculoskeletal:         General: Normal range of motion. Cervical back: Normal range of motion. Right lower leg: No edema. Left lower leg: No edema. Skin:     General: Skin is warm and dry. Neurological:      Mental Status: She is alert and oriented to person, place, and time.       Gait: Gait is intact. Psychiatric:         Mood and Affect: Mood normal.         Speech: Speech normal.         Behavior: Behavior normal. Behavior is cooperative. Thought Content: Thought content normal.         Cognition and Memory: Cognition and memory normal.         Judgment: Judgment normal.     Recent Results (from the past 12 hour(s))   CBC W/O DIFF    Collection Time: 21 11:17 PM   Result Value Ref Range    WBC 10.9 3.6 - 11.0 K/uL    RBC 4.00 3.80 - 5.20 M/uL    HGB 12.1 11.5 - 16.0 g/dL    HCT 35.9 35.0 - 47.0 %    MCV 89.8 80.0 - 99.0 FL    MCH 30.3 26.0 - 34.0 PG    MCHC 33.7 30.0 - 36.5 g/dL    RDW 13.1 11.5 - 14.5 %    PLATELET 103 972 - 223 K/uL    MPV 9.9 8.9 - 12.9 FL    NRBC 0.0 0  WBC    ABSOLUTE NRBC 0.00 0.00 - 2.93 K/uL   METABOLIC PANEL, COMPREHENSIVE    Collection Time: 21 11:17 PM   Result Value Ref Range    Sodium 137 136 - 145 mmol/L    Potassium 3.2 (L) 3.5 - 5.1 mmol/L    Chloride 103 97 - 108 mmol/L    CO2 22 21 - 32 mmol/L    Anion gap 12 5 - 15 mmol/L    Glucose 104 (H) 65 - 100 mg/dL    BUN 5 (L) 6 - 20 MG/DL    Creatinine 0.50 (L) 0.55 - 1.02 MG/DL    BUN/Creatinine ratio 10 (L) 12 - 20      GFR est AA >60 >60 ml/min/1.73m2    GFR est non-AA >60 >60 ml/min/1.73m2    Calcium 10.0 8.5 - 10.1 MG/DL    Bilirubin, total 0.5 0.2 - 1.0 MG/DL    ALT (SGPT) 29 12 - 78 U/L    AST (SGOT) 19 15 - 37 U/L    Alk. phosphatase 107 45 - 117 U/L    Protein, total 7.2 6.4 - 8.2 g/dL    Albumin 3.3 (L) 3.5 - 5.0 g/dL    Globulin 3.9 2.0 - 4.0 g/dL    A-G Ratio 0.8 (L) 1.1 - 2.2     PROTEIN/CREATININE RATIO, URINE    Collection Time: 21 11:17 PM   Result Value Ref Range    Protein, urine random 10 0.0 - 11.9 mg/dL    Creatinine, urine 14.50 mg/dL    Protein/Creat.  urine Ratio 0.7          Impression:  at [address] with labile Bps, all normal to mild range, asymptomatic, p/cr with bump from 0.4-->0.7  Reactive NST    Plan:   Reviewed patient, Bps, labs with Dr. Esha Montgomery and will proceed with following POC  Admit to APU for BP monitoring  Check Bps q4 hours  Continue Procardia 90mg XL daily  NST daily  Reviewed plan with patient/partner, all questions asked/answered and patient/partner agree with plan    Dennis Ortiz CNM  12:39 AM

## 2021-11-06 NOTE — ED PROVIDER NOTES
[facility] History and Physical    Patient is a 31 y.o.  at 29w1d with hany 22 who presents to the [facility] with c/o severe range blood pressure at home at 2243. She reports she was seen on L&D for severe range BP in office on  and had a BP check in the office today and reports her first two Bps severe range, 160/98 and then 3rd was mild range, 148/94. Labs were sent today in the office, but they are not resulted. She reports she checked her BP at home and it was 168/101, 160/100, 155/90s. She reports good FM. Denies contractions, VB, LOF, n/v/d, fever, cough, sore throat, SOB/difficulty breathing, loss of taste/smell, exposure to anyone with COVID, h/a, changes in vision, RUQ/epigastric pain. She reports she has another BP check scheduled for Tuesday. She reports prenatal care with Dr. London Rangel c/b  Elevated 1hr GCT, 3 hour scheduled this week  CHTN- dx at 16w, baseline preE labs wnl except p/c 0.44, baby ASA, Procardia 90mg XL- just increased from 60mg on 10/2  Obesity        PNC: Blood type: O            RH: pos            Hep B: negative            Rubella: immune            GBS status: unknown            HIV: non reactive            RPR/TPA/VDRL: negative            GC/CT: negative            HSV serology: not noted on prenatal records, but patient denies any hx of HSV    The history is provided by the patient and the spouse. Hypertension   Pertinent negatives include no chest pain, no headaches, no shortness of breath, no nausea and no vomiting. Past Medical History:   Diagnosis Date    Essential hypertension     Gestational hypertension     Migraines        History reviewed. No pertinent surgical history.       Family History:   Problem Relation Age of Onset    Hypertension Mother     Diabetes Paternal Grandmother        Social History     Socioeconomic History    Marital status:      Spouse name: Not on file    Number of children: Not on file    Years of education: Not on file    Highest education level: Not on file   Occupational History    Not on file   Tobacco Use    Smoking status: Never Smoker    Smokeless tobacco: Never Used   Vaping Use    Vaping Use: Never used   Substance and Sexual Activity    Alcohol use: Not Currently     Alcohol/week: 0.0 - 0.8 standard drinks    Drug use: No    Sexual activity: Yes   Other Topics Concern     Service Not Asked    Blood Transfusions Not Asked    Caffeine Concern Not Asked    Occupational Exposure Not Asked    Hobby Hazards Not Asked    Sleep Concern Not Asked    Stress Concern Not Asked    Weight Concern Not Asked    Special Diet Not Asked    Back Care Not Asked    Exercise Not Asked    Bike Helmet Not Asked    Seat Belt Not Asked    Self-Exams Not Asked   Social History Narrative    Not on file     Social Determinants of Health     Financial Resource Strain:     Difficulty of Paying Living Expenses: Not on file   Food Insecurity:     Worried About Running Out of Food in the Last Year: Not on file    [place] of Food in the Last Year: Not on file   Transportation Needs:     Lack of Transportation (Medical): Not on file    Lack of Transportation (Non-Medical):  Not on file   Physical Activity:     Days of Exercise per Week: Not on file    Minutes of Exercise per Session: Not on file   Stress:     Feeling of Stress : Not on file   Social Connections:     Frequency of Communication with Friends and Family: Not on file    Frequency of Social Gatherings with Friends and Family: Not on file    Attends Druze Services: Not on file    Active Member of Clubs or Organizations: Not on file    Attends Club or Organization Meetings: Not on file    Marital Status: Not on file   Intimate Partner Violence:     Fear of Current or Ex-Partner: Not on file    Emotionally Abused: Not on file    Physically Abused: Not on file    Sexually Abused: Not on file   Housing Stability:     Unable to Pay for Housing in the Last Year: Not on file    Number of Places Lived in the Last Year: Not on file    Unstable Housing in the Last Year: Not on file     Denies tobacco, alcohol, illicits  Denies hx STIs    ALLERGIES: Sulfa (sulfonamide antibiotics)    Review of Systems   Constitutional: Negative for chills and fever. Eyes: Negative for visual disturbance. Respiratory: Negative for cough and shortness of breath. Cardiovascular: Negative for chest pain and leg swelling. Gastrointestinal: Negative for abdominal pain, diarrhea, nausea and vomiting. Genitourinary: Negative for vaginal bleeding and vaginal discharge. Musculoskeletal: Negative for gait problem. Neurological: Negative for speech difficulty and headaches. All other systems reviewed and are negative. Vitals:    11/05/21 2248   BP: (!) 149/103   Pulse: (!) 118   Resp: 16   Temp: 98.9 °F (37.2 °C)            Physical Exam  Vitals and nursing note reviewed. Constitutional:       General: She is awake. She is not in acute distress. Appearance: Normal appearance. She is well-developed, well-groomed and normal weight. Comments: Comfortable resting in bed, smiling, partner at bedside   HENT:      Head: Normocephalic. Nose: Nose normal.   Cardiovascular:      Rate and Rhythm: Normal rate and regular rhythm. Pulses: Normal pulses. Pulmonary:      Effort: Pulmonary effort is normal. No respiratory distress. Comments: Breathing easy and unlabored  Abdominal:      Tenderness: There is no abdominal tenderness. Comments: Gravid c/w 29w  FHTs: 150s, +accels, neg decels, moderate variability- active fetal movement noted and heard on monitor- very difficult to keep on monitor- patient sitting up in bed and was picking up maternal heart rate at beginning of tracing per RN  [place]: none   Genitourinary:     Comments: Deferred, no labor complaints  Musculoskeletal:         General: Normal range of motion. Cervical back: Normal range of motion. Right lower leg: No edema. Left lower leg: No edema. Skin:     General: Skin is warm and dry. Neurological:      Mental Status: She is alert and oriented to person, place, and time. Gait: Gait is intact. Psychiatric:         Mood and Affect: Mood normal.         Speech: Speech normal.         Behavior: Behavior normal. Behavior is cooperative. Thought Content: Thought content normal.         Cognition and Memory: Cognition and memory normal.         Judgment: Judgment normal.          MDM  Number of Diagnoses or Management Options  29 weeks gestation of pregnancy: new and requires workup  Chronic hypertension affecting pregnancy: new and requires workup  Obesity affecting pregnancy in third trimester: new and requires workup     Amount and/or Complexity of Data Reviewed  Clinical lab tests: ordered and reviewed  Tests in the medicine section of CPT®: ordered and reviewed  Discuss the patient with other providers: yes (Dr. Sara Sanon)  Independent visualization of images, tracings, or specimens: yes (NST)      ED Course as of 11/06/21 0028   Fri Nov 05, 2021   2304 C/o severe range BP at home 168/101, 160/100, 155/90s. Denies h/a, changes in vision, RUQ/epigastric pain. Good FM. Denies VB/LOF/Contractions. Send preE labs, serial Bps. [SB]   Sat Nov 06, 8117   7401 METABOLIC PANEL, COMPREHENSIVE(!):    Sodium 137   Potassium 3.2(!)   Chloride 103   CO2 22   Anion gap 12   Glucose 104(!)   BUN 5(!)   Creatinine 0.50(!)   BUN/Creatinine ratio 10(!)   GFR est AA >60   GFR est non-AA >60   Calcium 10.0   Bilirubin, total 0.5   ALT 29   AST 19   Alk. phosphatase 107   Protein, total 7.2   Albumin 3.3(!)   Globulin 3.9   A-G Ratio 0.8(!) [SB]   0019 PROTEIN/CREATININE RATIO, URINE:    Protein, urine random 10   Creatinine, urine 14.50   Protein/Creat.  urine Ratio 0.7 [SB]   0019 CBC W/O DIFF:    WBC 10.9   RBC 4.00   HGB 12.1   HCT 35.9   MCV 89.8   MCH 30.3   MCHC 33.7   RDW 13.1   PLATELET 549 MPV 9.9   NRBC 0.0   ABSOLUTE NRBC 0.00 [SB]   0019 Reviewed patient, Bps and labs with Dr. Dg Reed. Will admit to APU for BP monitoring overnight. Reviewed plan with patient/partner and they agree with plan. [SB]      ED Course User Index  [SB] Robert Domingo, MORRIS       Procedures  NST    Impression:  at 29w1d IUP  CHTN- severe range BP at home, mild range here- on procardia 90mg XL daily  Cat 1 tracing        Plan:  Admit to [facility]   Obtain reactive NST  CBC, CMP, urine P/Cr  Serial BPs  Reviewed prenatal records    Reviewed plan with patient/partner, all questions asked/answered and they agree with plan    Update 0015  S: Patient resting in bed.  Partner at bedside  O: NST reactive  FHTs: 145s, +accels, neg decels, moderate variability  Patient Vitals for the past 12 hrs:   Temp Pulse Resp BP   21 0006  (!) 109  133/84   21 2351  (!) 112  131/89   21 2335  (!) 110  (!) 141/97   21 2320  (!) 108  134/79   21 2303  (!) 128  (!) 156/104   21 2248 98.9 °F (37.2 °C) (!) 118 16 (!) 149/103     Recent Results (from the past 12 hour(s))   CBC W/O DIFF    Collection Time: 21 11:17 PM   Result Value Ref Range    WBC 10.9 3.6 - 11.0 K/uL    RBC 4.00 3.80 - 5.20 M/uL    HGB 12.1 11.5 - 16.0 g/dL    HCT 35.9 35.0 - 47.0 %    MCV 89.8 80.0 - 99.0 FL    MCH 30.3 26.0 - 34.0 PG    MCHC 33.7 30.0 - 36.5 g/dL    RDW 13.1 11.5 - 14.5 %    PLATELET 325 884 - 773 K/uL    MPV 9.9 8.9 - 12.9 FL    NRBC 0.0 0  WBC    ABSOLUTE NRBC 0.00 0.00 - 9.05 K/uL   METABOLIC PANEL, COMPREHENSIVE    Collection Time: 21 11:17 PM   Result Value Ref Range    Sodium 137 136 - 145 mmol/L    Potassium 3.2 (L) 3.5 - 5.1 mmol/L    Chloride 103 97 - 108 mmol/L    CO2 22 21 - 32 mmol/L    Anion gap 12 5 - 15 mmol/L    Glucose 104 (H) 65 - 100 mg/dL    BUN 5 (L) 6 - 20 MG/DL    Creatinine 0.50 (L) 0.55 - 1.02 MG/DL    BUN/Creatinine ratio 10 (L) 12 - 20      GFR est AA >60 >60 ml/min/1.73m2    GFR est non-AA >60 >60 ml/min/1.73m2    Calcium 10.0 8.5 - 10.1 MG/DL    Bilirubin, total 0.5 0.2 - 1.0 MG/DL    ALT (SGPT) 29 12 - 78 U/L    AST (SGOT) 19 15 - 37 U/L    Alk. phosphatase 107 45 - 117 U/L    Protein, total 7.2 6.4 - 8.2 g/dL    Albumin 3.3 (L) 3.5 - 5.0 g/dL    Globulin 3.9 2.0 - 4.0 g/dL    A-G Ratio 0.8 (L) 1.1 - 2.2     PROTEIN/CREATININE RATIO, URINE    Collection Time: 21 11:17 PM   Result Value Ref Range    Protein, urine random 10 0.0 - 11.9 mg/dL    Creatinine, urine 14.50 mg/dL    Protein/Creat.  urine Ratio 0.7       A:  at [address] with labile Bps, all normal to mild range, asymptomatic, p/cr with bump from 0.4-->0.7  Reactive NST  P: Reviewed patient, Bps, labs with Dr. Luis Lyon and will proceed with following POC  Admit to APU for BP monitoring  Check Bps q4 hours  Continue Procardia 90mg XL daily  NST daily  Reviewed plan with patient/partner, all questions asked/answered and patient/partner agree with plan

## 2021-11-06 NOTE — ED TRIAGE NOTES
6567 Patient arrived to [facility] after taking BP at home that was over 438 systolic per patient report. Patient placed on EFM and TOCO monitors, serial Bps being taken. Patient denies headache, swelling or RUQ pain. Reports appropriate fetal movement. CNM notified patient ready to be seen. [address] [facility] [place] at bedside to assess patient, will send labs per CNM orders and continue to monitor BP.

## 2021-11-06 NOTE — PROGRESS NOTES
Observation notice provided in writing to patient and/or caregiver as well as verbal explanation of the policy. Patients who are in outpatient status also receive the Observation notice. Patient has received notice and or patient representative has received via secure email, fax, or certified mail based on patient representative's preference.      ORIANA Hernández, MARIANA, LMHP-e

## 2021-11-07 LAB
ALBUMIN SERPL-MCNC: 2.9 G/DL (ref 3.5–5)
ALBUMIN/GLOB SERPL: 0.7 {RATIO} (ref 1.1–2.2)
ALP SERPL-CCNC: 93 U/L (ref 45–117)
ALT SERPL-CCNC: 24 U/L (ref 12–78)
ANION GAP SERPL CALC-SCNC: 8 MMOL/L (ref 5–15)
AST SERPL-CCNC: 10 U/L (ref 15–37)
BASOPHILS # BLD: 0 K/UL (ref 0–0.1)
BASOPHILS NFR BLD: 0 % (ref 0–1)
BILIRUB SERPL-MCNC: 0.4 MG/DL (ref 0.2–1)
BUN SERPL-MCNC: 4 MG/DL (ref 6–20)
BUN/CREAT SERPL: 7 (ref 12–20)
CALCIUM SERPL-MCNC: 9.5 MG/DL (ref 8.5–10.1)
CHLORIDE SERPL-SCNC: 106 MMOL/L (ref 97–108)
CO2 SERPL-SCNC: 22 MMOL/L (ref 21–32)
CREAT SERPL-MCNC: 0.57 MG/DL (ref 0.55–1.02)
DIFFERENTIAL METHOD BLD: ABNORMAL
EOSINOPHIL # BLD: 0.1 K/UL (ref 0–0.4)
EOSINOPHIL NFR BLD: 1 % (ref 0–7)
ERYTHROCYTE [DISTWIDTH] IN BLOOD BY AUTOMATED COUNT: 13.5 % (ref 11.5–14.5)
GESTATIONAL 3HR GTT,GESTA: ABNORMAL
GLOBULIN SER CALC-MCNC: 4.1 G/DL (ref 2–4)
GLUCOSE 1H P 100 G GLC PO SERPL-MCNC: 209 MG/DL (ref 65–180)
GLUCOSE BLD STRIP.AUTO-MCNC: 131 MG/DL (ref 65–117)
GLUCOSE BLD STRIP.AUTO-MCNC: 171 MG/DL (ref 65–117)
GLUCOSE P FAST SERPL-MCNC: 102 MG/DL (ref 65–95)
GLUCOSE SERPL-MCNC: 154 MG/DL (ref 65–100)
GLUCOSE, 2 HR,GSTT2: 208 MG/DL (ref 65–155)
GLUCOSE, 3 HR,GSTT3: 153 MG/DL (ref 65–140)
HCT VFR BLD AUTO: 32.8 % (ref 35–47)
HGB BLD-MCNC: 11 G/DL (ref 11.5–16)
IMM GRANULOCYTES # BLD AUTO: 0.1 K/UL (ref 0–0.04)
IMM GRANULOCYTES NFR BLD AUTO: 1 % (ref 0–0.5)
LYMPHOCYTES # BLD: 2.3 K/UL (ref 0.8–3.5)
LYMPHOCYTES NFR BLD: 28 % (ref 12–49)
MCH RBC QN AUTO: 30.8 PG (ref 26–34)
MCHC RBC AUTO-ENTMCNC: 33.5 G/DL (ref 30–36.5)
MCV RBC AUTO: 91.9 FL (ref 80–99)
MONOCYTES # BLD: 0.9 K/UL (ref 0–1)
MONOCYTES NFR BLD: 10 % (ref 5–13)
NEUTS SEG # BLD: 5 K/UL (ref 1.8–8)
NEUTS SEG NFR BLD: 60 % (ref 32–75)
NRBC # BLD: 0 K/UL (ref 0–0.01)
NRBC BLD-RTO: 0 PER 100 WBC
PLATELET # BLD AUTO: 348 K/UL (ref 150–400)
PMV BLD AUTO: 9.6 FL (ref 8.9–12.9)
POTASSIUM SERPL-SCNC: 2.7 MMOL/L (ref 3.5–5.1)
PROT SERPL-MCNC: 7 G/DL (ref 6.4–8.2)
RBC # BLD AUTO: 3.57 M/UL (ref 3.8–5.2)
SERVICE CMNT-IMP: ABNORMAL
SERVICE CMNT-IMP: ABNORMAL
SODIUM SERPL-SCNC: 136 MMOL/L (ref 136–145)
WBC # BLD AUTO: 8.3 K/UL (ref 3.6–11)

## 2021-11-07 PROCEDURE — 80053 COMPREHEN METABOLIC PANEL: CPT

## 2021-11-07 PROCEDURE — 96374 THER/PROPH/DIAG INJ IV PUSH: CPT

## 2021-11-07 PROCEDURE — 36415 COLL VENOUS BLD VENIPUNCTURE: CPT

## 2021-11-07 PROCEDURE — 82962 GLUCOSE BLOOD TEST: CPT

## 2021-11-07 PROCEDURE — 96372 THER/PROPH/DIAG INJ SC/IM: CPT

## 2021-11-07 PROCEDURE — 74011250637 HC RX REV CODE- 250/637: Performed by: OBSTETRICS & GYNECOLOGY

## 2021-11-07 PROCEDURE — 74011250636 HC RX REV CODE- 250/636: Performed by: OBSTETRICS & GYNECOLOGY

## 2021-11-07 PROCEDURE — 96376 TX/PRO/DX INJ SAME DRUG ADON: CPT

## 2021-11-07 PROCEDURE — 85025 COMPLETE CBC W/AUTO DIFF WBC: CPT

## 2021-11-07 PROCEDURE — 4A0HXCZ MEASUREMENT OF PRODUCTS OF CONCEPTION, CARDIAC RATE, EXTERNAL APPROACH: ICD-10-PCS | Performed by: OBSTETRICS & GYNECOLOGY

## 2021-11-07 PROCEDURE — 74011250637 HC RX REV CODE- 250/637: Performed by: ADVANCED PRACTICE MIDWIFE

## 2021-11-07 PROCEDURE — 82952 GTT-ADDED SAMPLES: CPT

## 2021-11-07 PROCEDURE — 59025 FETAL NON-STRESS TEST: CPT

## 2021-11-07 PROCEDURE — G0378 HOSPITAL OBSERVATION PER HR: HCPCS

## 2021-11-07 RX ORDER — POTASSIUM CHLORIDE 14.9 MG/ML
10 INJECTION INTRAVENOUS
Status: COMPLETED | OUTPATIENT
Start: 2021-11-07 | End: 2021-11-07

## 2021-11-07 RX ORDER — BETAMETHASONE SODIUM PHOSPHATE AND BETAMETHASONE ACETATE 3; 3 MG/ML; MG/ML
12 INJECTION, SUSPENSION INTRA-ARTICULAR; INTRALESIONAL; INTRAMUSCULAR; SOFT TISSUE EVERY 24 HOURS
Status: COMPLETED | OUTPATIENT
Start: 2021-11-07 | End: 2021-11-08

## 2021-11-07 RX ADMIN — NIFEDIPINE 90 MG: 30 TABLET, FILM COATED, EXTENDED RELEASE ORAL at 06:08

## 2021-11-07 RX ADMIN — POTASSIUM CHLORIDE 10 MEQ: 14.9 INJECTION, SOLUTION INTRAVENOUS at 12:06

## 2021-11-07 RX ADMIN — LABETALOL HYDROCHLORIDE 200 MG: 200 TABLET, FILM COATED ORAL at 08:58

## 2021-11-07 RX ADMIN — LABETALOL HYDROCHLORIDE 200 MG: 200 TABLET, FILM COATED ORAL at 18:00

## 2021-11-07 RX ADMIN — POTASSIUM CHLORIDE 10 MEQ: 14.9 INJECTION, SOLUTION INTRAVENOUS at 13:43

## 2021-11-07 RX ADMIN — ASPIRIN 81 MG CHEWABLE TABLET 81 MG: 81 TABLET CHEWABLE at 06:09

## 2021-11-07 RX ADMIN — BETAMETHASONE SODIUM PHOSPHATE AND BETAMETHASONE ACETATE 12 MG: 3; 3 INJECTION, SUSPENSION INTRA-ARTICULAR; INTRALESIONAL; INTRAMUSCULAR at 12:07

## 2021-11-07 NOTE — PROGRESS NOTES
Ante Partum Progress Note    Lulu Jane  [id number]    Assessment: 32 y/o  with IUP at 29w3d with CHTN and SI preE w/o SF. 1. CHTN with SI PreE w/o SF  -Labile BPs with increase in requirement for antihypertensives. -Currently on procardia 90 xl daily and labetalol 200 mg bid (added )  -Mild range BPs  -No preE sx and labs stable except increase in proteinuria (baseline p/c .44, now .7)  -Repeat labs today  -MFM consult tomorrow    2. IUP at 29 2/7 wks  -Normal growth on  with reassuring fetal surveillance  -NST bid  -Recommend BMZ after GTT    3. Failed 1 hour GTT  -3 hour GTT to be done    4. BMI 35      Orders/Charges: High and Non Stress Test    Patient states she is feeling well. She denies HA, visual changes, RUQ pain, ctx, vb, lof. +FM. Vitals:  Visit Vitals  BP (!) 143/91   Pulse (!) 109   Temp 98.9 °F (37.2 °C)   Resp 16   SpO2 97%     Temp (24hrs), Av.5 °F (36.9 °C), Min:97.6 °F (36.4 °C), Max:98.9 °F (37.2 °C)      Last 24hr Input/Output:  No intake or output data in the 24 hours ending 21 0933     Non stress test:  Reactive  Baseline 140, moderate variability, +accels, -decels     No data found. No data found. Uterine Activity: None     Exam:  Patient without distress.      Abdomen, fundus soft non-tender     Extremities, no redness or tenderness               Additional Exam: Deferred    Labs:     Lab Results   Component Value Date/Time    WBC 8.3 2021 06:02 AM    WBC 10.9 2021 11:17 PM    WBC 10.0 2021 11:39 AM    HGB 11.0 (L) 2021 06:02 AM    HGB 12.1 2021 11:17 PM    HGB 11.7 2021 11:39 AM    HCT 32.8 (L) 2021 06:02 AM    HCT 35.9 2021 11:17 PM    HCT 34.9 (L) 2021 11:39 AM    PLATELET 494 9862 06:02 AM    PLATELET 210  11:17 PM    PLATELET 933 8407 11:39 AM       Recent Results (from the past 24 hour(s))   GLUCOSE, GESTATIONAL, 3 HR TOLERANCE    Collection Time: 21  6:02 AM Result Value Ref Range    GESTATIONAL 3HR GTT          Glucose fasting, gestational 102 (H) 65 - 95 MG/DL    GLUCOSE, 1  (H) 65 - 180 MG/DL    GLUCOSE, 2  (H) 65 - 155 MG/DL    GLUCOSE, 3 HR PENDING MG/DL   CBC WITH AUTOMATED DIFF    Collection Time: 11/07/21  6:02 AM   Result Value Ref Range    WBC 8.3 3.6 - 11.0 K/uL    RBC 3.57 (L) 3.80 - 5.20 M/uL    HGB 11.0 (L) 11.5 - 16.0 g/dL    HCT 32.8 (L) 35.0 - 47.0 %    MCV 91.9 80.0 - 99.0 FL    MCH 30.8 26.0 - 34.0 PG    MCHC 33.5 30.0 - 36.5 g/dL    RDW 13.5 11.5 - 14.5 %    PLATELET 931 314 - 159 K/uL    MPV 9.6 8.9 - 12.9 FL    NRBC 0.0 0  WBC    ABSOLUTE NRBC 0.00 0.00 - 0.01 K/uL    NEUTROPHILS 60 32 - 75 %    LYMPHOCYTES 28 12 - 49 %    MONOCYTES 10 5 - 13 %    EOSINOPHILS 1 0 - 7 %    BASOPHILS 0 0 - 1 %    IMMATURE GRANULOCYTES 1 (H) 0.0 - 0.5 %    ABS. NEUTROPHILS 5.0 1.8 - 8.0 K/UL    ABS. LYMPHOCYTES 2.3 0.8 - 3.5 K/UL    ABS. MONOCYTES 0.9 0.0 - 1.0 K/UL    ABS. EOSINOPHILS 0.1 0.0 - 0.4 K/UL    ABS. BASOPHILS 0.0 0.0 - 0.1 K/UL    ABS. IMM.  GRANS. 0.1 (H) 0.00 - 0.04 K/UL    DF AUTOMATED

## 2021-11-08 LAB
GLUCOSE BLD STRIP.AUTO-MCNC: 111 MG/DL (ref 65–117)
GLUCOSE BLD STRIP.AUTO-MCNC: 115 MG/DL (ref 65–117)
GLUCOSE BLD STRIP.AUTO-MCNC: 135 MG/DL (ref 65–117)
GLUCOSE BLD STRIP.AUTO-MCNC: 142 MG/DL (ref 65–117)
SERVICE CMNT-IMP: ABNORMAL
SERVICE CMNT-IMP: ABNORMAL
SERVICE CMNT-IMP: NORMAL
SERVICE CMNT-IMP: NORMAL

## 2021-11-08 PROCEDURE — 74011250636 HC RX REV CODE- 250/636: Performed by: OBSTETRICS & GYNECOLOGY

## 2021-11-08 PROCEDURE — 74011250637 HC RX REV CODE- 250/637: Performed by: OBSTETRICS & GYNECOLOGY

## 2021-11-08 PROCEDURE — 99356 PR PROLONGED SVC I/P OR OBS SETTING 1ST HOUR: CPT | Performed by: CLINICAL NURSE SPECIALIST

## 2021-11-08 PROCEDURE — 76819 FETAL BIOPHYS PROFIL W/O NST: CPT | Performed by: OBSTETRICS & GYNECOLOGY

## 2021-11-08 PROCEDURE — 59025 FETAL NON-STRESS TEST: CPT

## 2021-11-08 PROCEDURE — 99233 SBSQ HOSP IP/OBS HIGH 50: CPT | Performed by: CLINICAL NURSE SPECIALIST

## 2021-11-08 PROCEDURE — 82962 GLUCOSE BLOOD TEST: CPT

## 2021-11-08 PROCEDURE — 74011250637 HC RX REV CODE- 250/637: Performed by: ADVANCED PRACTICE MIDWIFE

## 2021-11-08 PROCEDURE — G0378 HOSPITAL OBSERVATION PER HR: HCPCS

## 2021-11-08 PROCEDURE — 96372 THER/PROPH/DIAG INJ SC/IM: CPT

## 2021-11-08 PROCEDURE — 76805 OB US >/= 14 WKS SNGL FETUS: CPT | Performed by: OBSTETRICS & GYNECOLOGY

## 2021-11-08 RX ADMIN — ASPIRIN 81 MG CHEWABLE TABLET 81 MG: 81 TABLET CHEWABLE at 06:54

## 2021-11-08 RX ADMIN — POTASSIUM BICARBONATE 20 MEQ: 391 TABLET, EFFERVESCENT ORAL at 08:45

## 2021-11-08 RX ADMIN — NIFEDIPINE 90 MG: 30 TABLET, FILM COATED, EXTENDED RELEASE ORAL at 06:54

## 2021-11-08 RX ADMIN — LABETALOL HYDROCHLORIDE 200 MG: 200 TABLET, FILM COATED ORAL at 08:45

## 2021-11-08 RX ADMIN — BETAMETHASONE SODIUM PHOSPHATE AND BETAMETHASONE ACETATE 12 MG: 3; 3 INJECTION, SUSPENSION INTRA-ARTICULAR; INTRALESIONAL; INTRAMUSCULAR at 13:46

## 2021-11-08 RX ADMIN — POTASSIUM BICARBONATE 20 MEQ: 391 TABLET, EFFERVESCENT ORAL at 18:06

## 2021-11-08 RX ADMIN — ACETAMINOPHEN 650 MG: 325 TABLET ORAL at 20:54

## 2021-11-08 RX ADMIN — LABETALOL HYDROCHLORIDE 200 MG: 200 TABLET, FILM COATED ORAL at 18:05

## 2021-11-08 RX ADMIN — Medication 10 ML: at 18:05

## 2021-11-08 NOTE — PROGRESS NOTES
Indication: PIH,GDM-Diet Controlled O24.410.   ____________________________________________________________________________  History: Age: 31 years. : 1 Para: 0.   Current Pregnancy: Blood group: O Rhesus D-positive. Pre- pregnancy data: Weight 165 lbs. Height 5 ft 1 ins. BMI 31.2.  ____________________________________________________________________________  Dating:  Stated EDC:  EDC: 22 GA by stated EDC: 29w4d  Current Scan on: 21 EDC: 22 GA by current scan: 29w4d  Best Overall Assessment: 21 EDC: 22 Assessed GA: 29w4d  The calculation of the gestational age by current scan was based on BPD, OFD, HC, AC and FL. The Best Overall Assessment is based on the stated [facility].  ____________________________________________________________________________  Anatomy Scan:  Garcia gestation. Biometry:  BPD 74.5 mm 48th% 29w6d (29w1d to 30w4d)  .2 mm 17th% 29w4d (27w4d to 31w5d)  .2 mm 80th% 30w6d (29w6d to 31w6d)  FL 55.1 mm 22nd% 29w1d (27w0d to 31w1d)  OFD 95.3 mm 24th% 28w4d  EFW (lbs/oz) 3 lbs 5 ozs  EFW (g) 1506 g  58th%     Fetal heart activity: present. Fetal heart rate: 159 bpm.   Fetal presentation: cephalic. Cord: 3 vessels. Placenta: anterior. Fetal Anatomy:  Visualized with normal appearance: chest, gastrointestinal tract, kidneys, bladder. Heart: The 4-chamber view was obtained but outflow tracts could not be adequately visualized. Summary of Ultrasound Findings:  Transabdominal US. U/S machine: [employer] E8 Expert. U/S view: limited by body habitus. Impression: The fetal anatomy survey appears normal and fetal growth is appropriate.    ____________________________________________________________________________  Fetal Wellbeing Assessment:  Amniotic fluid: normal. RITA: 18.1 cm. MVP: 5.2 cm. Q1: 5.2 cm. Q2: 3.7 cm. Q3: 4.4 cm. Q4: 4.8 cm.    Biophysical Profile: Fetal body movements: normal (2), Fetal tone: normal (2), Fetal breathing movements: normal (2), Amniotic fluid volume: normal (2). Score 8 / 8. Summary: Reassuring fetal status. ____________________________________________________________________________  Doppler:  Fetal Doppler:  Umbilical Artery: PS -27.7 cm/s    ED -19.79 cm/s    S/D ratio 2.59     RI 0.61     PI 0.92     TAMX -37.61 cm/s     U/S Machine: [employer] E8 Expert.  ____________________________________________________________________________  Consultation:  Type of Consultation: Inpatient Consultation (20 min)  Consultant: Dr. Leatha Dhaliwal  see under report.  ____________________________________________________________________________  Maternal Structures:  Uterus: Fibroids: Fibroid 1: Size: 115 mm x 62 mm x 104 mm. Position: right lateral wall. Right Ovary:   Right Ovary size: 30 mm x 27 mm x 30 mm. Volume: 12.7 ml.   Left Ovary: not visible.  ____________________________________________________________________________  Report Summary:  Impression: Ms. Harriett Mark is a 34yo G1 at 32w1 currently admitted with probable superimposed pre-eclampsia with severe features (meets criteria due to several severe range blood pressures). She was diagnosed in late first trimester with CHTN (140/90) and started on Nifedipine 30XL qd. Last week BP has been significantly above baseline (175/101 noted in flowsheets in Epic), Upr/cr was 0.4, and Nifedipine was increased to 60XL qd outpatient. She returned on 11/6/21 reporting SBP>160 at home, it was 160/99 here on 11/6/21, and Upr/cr is now 0.7. Nifedipine was increased to 90XL and Labetalol 200 BID has been added. Other labs are normal and she has no complaints. No HA, SOB, RUQ pain, vision changes, malaise. The patient also had a 3hr OGTT on 11/7/21 before her first dose of BMZ: 102/209/208/153. BG since BMZ has been 131, 171, 111, 115. ULTRASOUND:    Today a cephalic SLIUP with seen with AGA growth. Normal visible fetal anatomy, limited by late gestational age.   Normal fluid, movement, placenta. There is a roughly 11 x 6 x 10 cm right lateral wall fibroid as described above. Otherwise, normal maternal anatomy. BPP is 8/8. Reassuring fetal surveillance. COUNSELING:    I reviewed the diagnostic, implications, and management of pre-eclampsia. I reviewed that she meets severe criteria by blood pressures. I also reviewed that at times the diagnosis is difficult in the setting of CHTN with titrated anti-hypertensive meds, but the diagnosis seems relatively clear at present. I also reviewed the risk of severe range blood pressure recurrence despite medications, seizures, strokes, abruption, transient liver, kidney, and/or platelet dysfunction that can occur rapidly with severe pre-eclampsia. Because of this, national guidelines recommend continued inpatient mgmt with delivery at 34 weeks unless earlier indications arise. She is now s/p BMZ #1. We discussed an overview of the implications, risks, and plan of management for GDM. I let her know that mild elevations in blood sugar due not need to be chased over the next few days, as BMZ will increase BG for at least 5 days. After this, BG should be checked 4x/d, she should try to adhere to GDM diet, and we will start meds (I let her know usually insulin) if still poorly controlled. I spent 20 min counseling Ms. Hayes and her . I provided them with time to ask questions and to have them all answered. Recommendations: Continue inpatient management for superimposed pre-eclampsia with severe features (severe range blood pressures). If any other further findings of severe pre-eclampsia (including recurrent severe range blood pressures) please give magnesium for eclampsia prophylaxis. We will attempt expectant management until 34 weeks so long as she remains stable. It is reasonable to begin QID capillary blood glucose checks now.   Unless very high (e.g. >250), it is probably reasonable not to decide on these values until more than 5 days post BMZ administration.

## 2021-11-08 NOTE — PROGRESS NOTES
Ante Partum Progress Note    Bridgertta Numbers  [id number]    Assessment/Plan: 29w4d     1. CHTN with SI PreE w/ SF:  -Labile BPs with increase in requirement for antihypertensives. -Currently on procardia 90 xl daily and labetalol 200 mg bid (added )  - Mild range BPs  - No preE sx and labs stable except increase in proteinuria (baseline p/c 0.44, now 0.7)  - Remainder of labs stable, will plan repeating once weekly or more frequently should BPs or new sxs indicate this is necessary  - appreciate MFM consult -> will continue inpatient management for now, cont BMZ now that 3 hr gtt completed, plan for magnesium if BPs escalate, plan delivery at 34 wks, plan for weekly BPP and f/u with MFM while in house, continue bid NSTs and close BP management     2. IUP at 29 4/7 wks  -Normal growth on  with reassuring fetal surveillance  - NST bid  - complete BMZ course  - cephalic on BPP today  - BPP 8/, normal growth 58th%ile  - NICU consult ordered  - tdap     3. GDM (new diagnosis) based on failed 3hr gtt while in hospital  - diabetic counseling today  - diabetic diet ordered  - BGs 4 x daily  - expect they will be elevated given BMZ  - after 5 days post BMZ would consider medications to control elevated BG as BMZ effect should have worn off by this time  - cont monthly growth scans     4. BMI 35       Orders/Charges: High and Non Stress Test  X 2    Patient states she has no new complaints. She overall feels good and denies HA, visual changes or RUQ discomfort. She notes active FM. She denies labor complaints.       Vitals:  Visit Vitals  BP (!) 149/94 (BP 1 Location: Left upper arm, BP Patient Position: At rest)   Pulse (!) 114   Temp 98.8 °F (37.1 °C)   Resp 16   SpO2 98%     Temp (24hrs), Av.5 °F (36.9 °C), Min:97.9 °F (36.6 °C), Max:98.8 °F (37.1 °C)      Last 24hr Input/Output:  No intake or output data in the 24 hours ending 21 1306     Non stress test:  Reactive    Patient Vitals for the past 4 hrs:   Mode Fetal Heart Rate Fetal Activity Variability Decelerations Accelerations RN Reviewed Strip? Non Stress Test   11/08/21 1052 External 160 Present 6-25 BPM None Yes Yes Reactive      Patient Vitals for the past 4 hrs: Mode Fetal Heart Rate Fetal Activity Variability Decelerations Accelerations RN Reviewed Strip? Non Stress Test   11/08/21 1052 External 160 Present 6-25 BPM None Yes Yes Reactive        Uterine Activity: None     Exam:  Patient without distress.      Abdomen, fundus soft non-tender     Extremities, no redness or tenderness               Additional Exam: Patient without distress, Abdomen soft, non-tender, Fundus soft and non tender, Right upper quadrant non-tender, Perineum No sign of blood or amniotic fluid, Lower extremities edema No, Patellar Reflexes: 1+ bilaterally and Clonus: absent    Labs:     Lab Results   Component Value Date/Time    WBC 8.3 11/07/2021 06:02 AM    WBC 10.9 11/05/2021 11:17 PM    WBC 10.0 11/02/2021 11:39 AM    HGB 11.0 (L) 11/07/2021 06:02 AM    HGB 12.1 11/05/2021 11:17 PM    HGB 11.7 11/02/2021 11:39 AM    HCT 32.8 (L) 11/07/2021 06:02 AM    HCT 35.9 11/05/2021 11:17 PM    HCT 34.9 (L) 11/02/2021 11:39 AM    PLATELET 439 10/62/9733 06:02 AM    PLATELET 868 07/07/5764 11:17 PM    PLATELET 964 54/03/6228 11:39 AM       Recent Results (from the past 24 hour(s))   GLUCOSE, POC    Collection Time: 11/07/21  3:23 PM   Result Value Ref Range    Glucose (POC) 131 (H) 65 - 117 mg/dL    Performed by Wendy Rosa    GLUCOSE, POC    Collection Time: 11/07/21  7:33 PM   Result Value Ref Range    Glucose (POC) 171 (H) 65 - 117 mg/dL    Performed by Ale POTTER)    GLUCOSE, POC    Collection Time: 11/08/21  6:47 AM   Result Value Ref Range    Glucose (POC) 111 65 - 117 mg/dL    Performed by Monica Chakraborty Dr, POC    Collection Time: 11/08/21 10:55 AM   Result Value Ref Range    Glucose (POC) 115 65 - 117 mg/dL    Performed by Annita Garvey (RN)

## 2021-11-08 NOTE — DIABETES MGMT
[address] NURSE SPECIALIST CONSULT     Initial Presentation   Matti Johnston is a 31 y.o. pregnant female at 28w2d who presented to the [facility] with severe range blood pressure. HX:   Past Medical History:   Diagnosis Date    Essential hypertension     Gestational hypertension     Migraines         INITIAL DX:   29 weeks gestation of pregnancy [Z3A.29]  Chronic hypertension affecting pregnancy [O10.919]     Current Treatment     TX: Antihypertensive agents, MFM consult    Consulted by Lucas Nix MD for advanced diabetes nursing assessment and care for:   [x] Home management assessment  [x] Survival skill education    Hospital Course   Clinical progress has been complicated by new diagnosis of gestational diabetes. Diabetes History   No previous history of diabetes    Diabetes Medication History  Key Antihyperglycemic Medications     Patient is on no antihyperglycemic meds. Subjective   I never really had an incentive to eat healthy but this changes everything for sure. 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 Enjoyed walking but activity has been limited with elevated BP and pelvic discomfort with movement. Objective   Physical exam  General Overweight pregnant female in no acute distress/ill-appearing. Conversant and cooperative  Neuro  Alert, oriented   Vital Signs   Visit Vitals  BP (!) 149/94 (BP 1 Location: Left upper arm, BP Patient Position: At rest)   Pulse (!) 114   Temp 98.8 °F (37.1 °C)   Resp 16   SpO2 98%     Skin  Warm and dry. No acanthosis noted along neckline. Heart   Regular rate and rhythm. No murmurs, rubs or gallops  Lungs  Clear to auscultation without rales or rhonchi  Extremities No foot wounds        Laboratory  Recent Labs     11/07/21  0939 11/07/21  0602 11/05/21  2317   *  --  104*   AGAP 8  --  12   WBC  --  8.3 10.9   CREA 0.57  --  0.50*   GFRNA >60  --  >60   AST 10*  --  19   ALT 24  --  29       Blood glucose pattern      Significant diabetes-related events over the past 24-72 hours  The patient also had a 3hr OGTT on 11/7/21: 102/209/208/153. BG since [facility] has been 131, 171, 111, 115    Assessment and Plan   Nursing Diagnosis Risk for unstable blood glucose pattern   Nursing Intervention Domain 5250 Decision-making Support   Nursing Interventions Examined current inpatient diabetes control   Explored factors facilitating and impeding inpatient management  Identified self-management practices impeding diabetes control  Explored corrective strategies with patient and responsible inpatient provider   Informed patient of rational for insulin strategy while hospitalized   Diabetes Education Checklist    Pathophysiology  [x] Diabetes basics  [x] Differences between type 1/type 2/GDM    Diagnostic Criteria  [x] Interpretation of Labs  [x] Blood glucose goals  GDM Glucose targets:   Overnight and fasting: under 95  2hr postprandial: less than 120     Blood Glucose Monitoring  [x] Using a glucometer  [x] When to check BG  [x] Record keeping  Notes: Discussed they will need to obtain a meter, lancets and strips.   Patient instructed to obtain a glucose reading first thing in the am and 2 hrs following meals and if the \"don't feel right\"  Patient to create a log of blood sugar readings and present to their provider for ongoing    Hypoglycemia  [x] Signs & symptoms  [x] Rule of 15 and other treatment  Notes: If patient feels dizzy, light headed or \"woozy\" patient to obtain a blood glucose reading. If blood glucose is under 70, patient instructed to drink 4-6 oz of milk, juice or regular soda then recheck 15 minutes later. If glucose under 70, repeat with another 4-6 oz regular juice/soda/milk. Once glucose over 70, eat a 15 gram snack like 1/2 peanut butter sandwich or meal.    Physical Activity & Exercise  [x] Review of current routine  [x] Impact on BG levels  [x] BG targets for physical activity  [x] When to avoid physical activity  Notes: Patient instructed to increase activity every week once stabilized at home. Activity can consist of walking, chores around his house, garden, cut grass. Nutrition Basics  [x] Starter tips for meal planning  [x] Meal & snack schedule   [x] Reading food labels  [x] Balanced plate method  [x] How different foods impact BG levels  [x] Carbohydrate food sources        [x] Low carb meal & snack options    Reducing Risks  [x] Long-term complications of uncontrolled gestational diabetes      Evaluation   Viki Becker is a 31year old pregnant female at 32w2d who was admitted with severe hypertension. She does not have a history of diabetes but had failed her 1 hr glucose tolerance test in the outpatient clinic earlier this week. She had a 3 hr glucose tolerance test with abnormal results of 209/208/153 indicating a new diagnosis of gestational diabetes. Following her GTT, she was given 12mg betamethasone with a second dose due now. Glucose values following betamethasone were 171 at bed, 111 fasting and 115 two hours following breakfast.  Betamethasone can impact blood glucose for up to 5 days following the last dose.        GDM Glucose targets:   Overnight and fasting: under 95  1hr postprandial: less than 140  2hr postprandial: less than 120 At this time, blood glucose will be monitored over the next 5 days and diabetes care will focus on glucose monitoring and diet control. If glucose is elevated following betamethasone impact, antihyperglycemic agents will need to start. Recommendations   1. POC glucose fasting and 1 hr post-prandial    2. Can adjust diet to provide more carbohydrates consider 45-60-60. OK to schedule strategic low carb snacks (15-20 grams CHO/snack) in the am/pm if hungry    3. Consider nutrition consult for diet education     Please encourage patient to participate in diabetes self care while in the hospital including allowing patient to use lancet for blood glucose monitoring. If fasting BG over 95 starting 11/14: Start 15 units NPH at bedtime (0.2 units/kg/dose)  If 2 hr post-prandial BG over 120 starting 11/14:  Start 15 units NPH at breakfast (0.2 units/kg/dose)  Discharge Recommendations   1. Will need a FUV with maternal fetal medicine after hospital discharge for ongoing diabetes management. Can certainly schedule her with our endocrinologist, Sachi Dutton MD here at [facility] for GDM- please let us know if you would like us to schedule. We can usually get patients in within 2 weeks    2. Prescription for glucometer kit (Meter, Lancets (100), Strips (100)). Patient to obtain a blood glucose reading four times daily. First thing in the morning prior to eating and drinking anything then 1 hr after lunch, dinner and at bedtime. Create a log and present to PCP for interpretation. 3. On Discharge, please place an order for \"diabetes education\" (Ronnie Boateng).   This will trigger a referral for the [facility] which includes outpatient education with a certified diabetes educator/nutritionist.    Billing Code(s)   [x] 02752/04765  Before making these care recommendations, I personally reviewed the hosptialization record, including laboratory and diagnostic data, medications and examined the patient at bedside (circumstances permitting).   Total minutes: 75      HUSSAIN Moya  Diabetes Clinical Nurse Specialist  [facility]  Access via [employer]

## 2021-11-08 NOTE — PROGRESS NOTES
Bedside and Verbal shift change report given to Evelin White RN  (oncoming nurse) by Bette Love RN  (offgoing nurse). Report included the following information SBAR, Kardex, Intake/Output, MAR and Recent Results.

## 2021-11-08 NOTE — CONSULTS
[address] of [place]  Prenatal Consult  Note Created Date/Time 2021 17:30:45  Note Date Note Time [facility]   2021 17:45:00 [id number]   Hospital Name  [address] [place]  First Name Last Name Place of [facility] [address]  InPatient   Reason for Consultation  29w3d w/ chronic HTN and superimposed preE w/o severe feature. Maternal History   Mother's Age Blood Type Mother's Race    1985 34 O Pos Black 1   RPR Serology HIV Rubella GBS HBsAg Prenatal Care EDC OB   Non-Reactive Negative Immune Not Done Negative Yes 2022   Pregnancy Complications  Obesity, secondary code (E66.9)  Comment  BMI 35.71  Maternal Steroids: Yes  Last Dose Date Last Dose Time Next Recent Dose Date Next Recent Dose Time   2021 12:07:00 2021 13:46:00   Maternal Medications: Yes  Procardia XL  Comment  90 mg Daily  Labetalol  Comment  200 mg BID     Recommendations  I have reviewed the mother`s medical chart and spoken with the obstetrician requesting this consult. I met with the mother. The baby's name is unknow for now and gender is to be a surprise. Based on the gestational age of 33 weeks gestation, there is some risk for mortality and significant risk for handicap if surviving. Using the 2018 [employer] Survival/Mortality Table, survival of infants admitted to the NICU is estimated at to be approximately 98%, although this can vary depending upon BW, gender, prenatal steroids and pre or  factors. Survival without severe IVH or ROP is estimated to be 97% of infants admitted to the NICU. We discussed that most any organ system can have problems related to prematurity.   Some of the most common morbidities associated with this degree of prematurity, including but not limited to: respiratory distress syndrome requiring CPAP or intubation and mechanical ventilation, pneumothorax, necrotizing enterocolitis with the potential for loss of bowel, patent ductus arteriosus, infection requiring intravenous antibiotics, and the need for intravenous nutrition and gavage feedings. There is increased risk of neurodevelopmental delays or disabilities in infants born prematurely. Fortunately, the most severe injuries (severe IVH and or periventricular leukomalacia, severe ROP or blindness) are relatively uncommon at this gestation. I discussed the delivery room management, and explained the personnel that will be present at delivery. I reviewed the plan for delayed cord clamping (if appropriate) and thermoregulation support. We discussed the various modes of respiratory management that are available in the delivery room, including PPV, CPAP, intubation and mechanical ventilation. We also discussed the use of artificial surfactant, which may or may not be needed. Some infants need other measures in their resuscitation (e.g. CPR, fluid, blood). Commonly, infants at this gestation need an umbilical catheter(s) to allow nutrition & monitoring. I reviewed the probable lab work and X-ray studies that will be obtained upon admission. When sufficiently stable, gavage feedings will be started. We discussed the critical importance of lactation to optimize outcome (improved neurodevelopment, reduced risk of NEC and infections among other things). We discussed how we will support mothers lactation. We discussed typical length of stay and discharge goals. Time was allotted for the family to ask questions, and all questions were answered to the best of my ability, given the information provided. The family understands and agrees with the present plan. Thank you for inviting me to speak with the family. We remain available if the family desires further discussion or clarification. The total length of floor unit time was 30.00 minute(s).  Counseling and/or coordination of care dominated more than fifty percent of the time.    Authenticated by: CLARE Umaña   Date/Time: 11/08/2021 18:43

## 2021-11-09 LAB
GLUCOSE BLD STRIP.AUTO-MCNC: 109 MG/DL (ref 65–117)
GLUCOSE BLD STRIP.AUTO-MCNC: 118 MG/DL (ref 65–117)
GLUCOSE BLD STRIP.AUTO-MCNC: 127 MG/DL (ref 65–117)
GLUCOSE BLD STRIP.AUTO-MCNC: 142 MG/DL (ref 65–117)
SERVICE CMNT-IMP: ABNORMAL
SERVICE CMNT-IMP: NORMAL

## 2021-11-09 PROCEDURE — 74011250637 HC RX REV CODE- 250/637: Performed by: ADVANCED PRACTICE MIDWIFE

## 2021-11-09 PROCEDURE — 59025 FETAL NON-STRESS TEST: CPT

## 2021-11-09 PROCEDURE — 82962 GLUCOSE BLOOD TEST: CPT

## 2021-11-09 PROCEDURE — 74011250637 HC RX REV CODE- 250/637: Performed by: OBSTETRICS & GYNECOLOGY

## 2021-11-09 PROCEDURE — 99231 SBSQ HOSP IP/OBS SF/LOW 25: CPT | Performed by: CLINICAL NURSE SPECIALIST

## 2021-11-09 PROCEDURE — G0378 HOSPITAL OBSERVATION PER HR: HCPCS

## 2021-11-09 PROCEDURE — 87081 CULTURE SCREEN ONLY: CPT

## 2021-11-09 RX ORDER — LABETALOL 200 MG/1
200 TABLET, FILM COATED ORAL EVERY 12 HOURS
Status: DISCONTINUED | OUTPATIENT
Start: 2021-11-09 | End: 2021-11-30 | Stop reason: HOSPADM

## 2021-11-09 RX ORDER — NIFEDIPINE 30 MG/1
90 TABLET, EXTENDED RELEASE ORAL DAILY
Status: DISCONTINUED | OUTPATIENT
Start: 2021-11-10 | End: 2021-11-30 | Stop reason: HOSPADM

## 2021-11-09 RX ADMIN — ASPIRIN 81 MG CHEWABLE TABLET 81 MG: 81 TABLET CHEWABLE at 08:57

## 2021-11-09 RX ADMIN — POTASSIUM BICARBONATE 20 MEQ: 391 TABLET, EFFERVESCENT ORAL at 09:03

## 2021-11-09 RX ADMIN — NIFEDIPINE 90 MG: 30 TABLET, FILM COATED, EXTENDED RELEASE ORAL at 08:58

## 2021-11-09 RX ADMIN — LABETALOL HYDROCHLORIDE 200 MG: 200 TABLET, FILM COATED ORAL at 08:57

## 2021-11-09 RX ADMIN — LABETALOL HYDROCHLORIDE 200 MG: 200 TABLET, FILM COATED ORAL at 20:26

## 2021-11-09 RX ADMIN — POTASSIUM BICARBONATE 20 MEQ: 391 TABLET, EFFERVESCENT ORAL at 17:30

## 2021-11-09 NOTE — PROGRESS NOTES
Ante Partum Progress Note    Meg Heredia  [id number]    Assessment/Plan: 29w5d     1. CHTN with SI PreE w/ SF:  -Labile BPs with increase in requirement for antihypertensives, now normal to mild range  -Currently on procardia 90 xl daily and labetalol 200 mg bid (added )  - No preE sx and labs stable except increase in proteinuria (baseline p/c 0.44, now 0.7)  - Remainder of labs stable, will plan repeating once weekly or more frequently should BPs or new sxs indicate this is necessary, preE labs due again by   - appreciate MFM consult -> will continue inpatient management for now, plan for magnesium if BPs escalate, plan delivery at 34 wks, plan for weekly BPP and f/u with MFM while in house, continue BID NSTs and close BP management     2. Prematurity  -  growth U/S EFW 1506g c/w 92%UOQ, cephalic  - s/p BMZ x 2 70/2   - s/p NICU consult  - GBS ordered today     3. GDM (new diagnosis) based on failed 3hr gtt while in hospital done prior to [address] course  - accuchecks  - see diabetic treatment team note from  for recommendations re: if BGs remain elevated by , but given BMZ course would hold on treatment prior to that time  - cont monthly growth scans     4. Hypokalemia - repleting w/ potassium bicarb  - repeat CMP 11/10    5. BMI 35      Orders/Charges: High and Non Stress Test  X 2    Patient states she does have HA, vision changes, epigastric pain. Notes good FM. Denies LOF, VB and contractions. Frustrated and in low spirits given the likelihood of needing to stay inpatient until delivery.     Vitals:  Visit Vitals  BP (!) 138/90   Pulse (!) 101   Temp 98.6 °F (37 °C)   Resp 18   SpO2 100%     Temp (24hrs), Av.2 °F (36.8 °C), Min:97.8 °F (36.6 °C), Max:98.6 °F (37 °C)      Last 24hr Input/Output:    Intake/Output Summary (Last 24 hours) at 2021 1401  Last data filed at 2021 1625  Gross per 24 hour   Intake 250 ml   Output    Net 250 ml        Non stress test:  Reactive    An NST was performed and was reactive. The baseline FHR was 150. Moderate baseline  variability was noted. Accelerations of sufficient amplitude and duration were noted. There were no decelerations noted. Uterine Activity: None     Exam:  Patient without distress.      Abdomen, fundus soft non-tender     Extremities, no redness or tenderness               Additional Exam: Deferred    Labs:     Lab Results   Component Value Date/Time    WBC 8.3 11/07/2021 06:02 AM    WBC 10.9 11/05/2021 11:17 PM    WBC 10.0 11/02/2021 11:39 AM    HGB 11.0 (L) 11/07/2021 06:02 AM    HGB 12.1 11/05/2021 11:17 PM    HGB 11.7 11/02/2021 11:39 AM    HCT 32.8 (L) 11/07/2021 06:02 AM    HCT 35.9 11/05/2021 11:17 PM    HCT 34.9 (L) 11/02/2021 11:39 AM    PLATELET 457 06/85/4830 06:02 AM    PLATELET 853 48/31/0792 11:17 PM    PLATELET 328 95/46/4438 11:39 AM       Recent Results (from the past 24 hour(s))   GLUCOSE, POC    Collection Time: 11/08/21  3:20 PM   Result Value Ref Range    Glucose (POC) 135 (H) 65 - 117 mg/dL    Performed by Lieutenant Glynn    GLUCOSE, POC    Collection Time: 11/08/21  8:15 PM   Result Value Ref Range    Glucose (POC) 142 (H) 65 - 117 mg/dL    Performed by Jasmin Amaya    GLUCOSE, POC    Collection Time: 11/09/21  6:34 AM   Result Value Ref Range    Glucose (POC) 109 65 - 117 mg/dL    Performed by Jasmin Amaya    GLUCOSE, POC    Collection Time: 11/09/21 10:58 AM   Result Value Ref Range    Glucose (POC) 142 (H) 65 - 117 mg/dL    Performed by Lexii MIRANDA

## 2021-11-09 NOTE — LACTATION NOTE
Initial [facility] LC visit - This is moms first baby and she is is planning to breast feed. She has not had an opportunity to take any classes. Discussed the importance of breast milk for babies in general, and the extra importance and benefits for pre-term babies. Discussed pumping and storage of breast milk. Discussed family centered care and what we do in the NICU to keep the family at the center of our care. Answered moms questions. Will continue to follow.

## 2021-11-09 NOTE — PROGRESS NOTES
Bedside and Verbal shift change report given to Harper Butler Rn  (oncoming nurse) by SREEDHAR Lopez  (offgoing nurse). Report included the following information SBAR, Kardex, MAR and Recent Results.

## 2021-11-09 NOTE — PROGRESS NOTES
Bedside and Verbal shift change report given to SREEDHAR Johnston RN (oncoming nurse) by Clare Paris RN (offgoing nurse). Report included the following information SBAR, Kardex, Intake/Output, MAR, Recent Results and Med Rec Status.

## 2021-11-09 NOTE — DIABETES MGMT
[address] NURSE SPECIALIST CONSULT     Initial Presentation   Bran Cervantes is a 31 y.o. pregnant female at 28w2d who presented to the [facility] with severe range blood pressure. HX:   Past Medical History:   Diagnosis Date    Essential hypertension     Gestational hypertension     Migraines         INITIAL DX:   29 weeks gestation of pregnancy [Z3A.29]  Chronic hypertension affecting pregnancy [O10.919]     Current Treatment     TX: Antihypertensive agents, MFM consult    Consulted by Harini Perdue MD for advanced diabetes nursing assessment and care for:   [x] Home management assessment  [x] Survival skill education    Hospital Course   Clinical progress has been complicated by new diagnosis of gestational diabetes. Diabetes History   No previous history of diabetes    Diabetes Medication History  Key Antihyperglycemic Medications     Patient is on no antihyperglycemic meds. Subjective   I never really had an incentive to eat healthy but this changes everything for sure. 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 Enjoyed walking but activity has been limited with elevated BP and pelvic discomfort with movement. Objective   Physical exam  General Overweight pregnant female in no acute distress/ill-appearing. Conversant and cooperative  Neuro  Alert, oriented   Vital Signs   Visit Vitals  BP (!) 141/83 (BP 1 Location: Left upper arm, BP Patient Position: At rest)   Pulse (!) 101   Temp 98.6 °F (37 °C)   Resp 18   SpO2 100%     Skin  Warm and dry. No acanthosis noted along neckline. Heart   Regular rate and rhythm. No murmurs, rubs or gallops  Lungs  Clear to auscultation without rales or rhonchi  Extremities No foot wounds        Laboratory  Recent Labs     21  0939 21  0602   *  --    AGAP 8  --    WBC  --  8.3   CREA 0.57  --    GFRNA >60  --    AST 10*  --    ALT 24  --        Blood glucose pattern      Significant diabetes-related events over the past 24-72 hours  The patient also had a 3hr OGTT on 21: 102/209/208/153. Fasting B  2 hr post-prandial: 142/135/115      Assessment and Plan   Nursing Diagnosis Risk for unstable blood glucose pattern   Nursing Intervention Domain 5250 Decision-making Support   Nursing Interventions Examined current inpatient diabetes control   Explored factors facilitating and impeding inpatient management  Identified self-management practices impeding diabetes control  Explored corrective strategies with patient and responsible inpatient provider   Informed patient of rational for insulin strategy while hospitalized   Diabetes Education Checklist    Pathophysiology  [x] Diabetes basics  [x] Differences between type 1/type 2/GDM    Diagnostic Criteria  [x] Interpretation of Labs  [x] Blood glucose goals  GDM Glucose targets:   Overnight and fasting: under 95  2hr postprandial: less than 120     Blood Glucose Monitoring  [x] Using a glucometer  [x] When to check BG  [x] Record keeping  Notes: Discussed they will need to obtain a meter, lancets and strips.   Patient instructed to obtain a glucose reading first thing in the am and 2 hrs following meals and if the \"don't feel right\"  Patient to create a log of blood sugar readings and present to their provider for ongoing    Hypoglycemia  [x] Signs & symptoms  [x] Rule of 15 and other treatment  Notes: If patient feels dizzy, light headed or \"woozy\" patient to obtain a blood glucose reading. If blood glucose is under 70, patient instructed to drink 4-6 oz of milk, juice or regular soda then recheck 15 minutes later. If glucose under 70, repeat with another 4-6 oz regular juice/soda/milk. Once glucose over 70, eat a 15 gram snack like 1/2 peanut butter sandwich or meal.    Physical Activity & Exercise  [x] Review of current routine  [x] Impact on BG levels  [x] BG targets for physical activity  [x] When to avoid physical activity  Notes: Patient instructed to increase activity every week once stabilized at home. Activity can consist of walking, chores around his house, garden, cut grass. Nutrition Basics  [x] Starter tips for meal planning  [x] Meal & snack schedule   [x] Reading food labels  [x] Balanced plate method  [x] How different foods impact BG levels  [x] Carbohydrate food sources        [x] Low carb meal & snack options    Reducing Risks  [x] Long-term complications of uncontrolled gestational diabetes      Evaluation   Suellen Rodriguez is a 31year old pregnant female at 32w2d who was admitted with severe hypertension. She does not have a history of diabetes but had failed her 1 hr glucose tolerance test in the outpatient clinic earlier this week. She had a 3 hr glucose tolerance test with abnormal results of 209/208/153 indicating a new diagnosis of gestational diabetes. Following her GTT, she was given 12mg betamethasone x1- last dose 11/8 at noon. Betamethasone can impact blood glucose for up to 5 days following the last dose.       Fasting glucose elevated at 109  2 hr post-prandial BG elevated over goal: 142/135/115     GDM Glucose targets:   Overnight and fasting: under 95  1hr postprandial: less than 140  2hr postprandial: less than 120     At this time, blood glucose will be monitored over the next 5 days and diabetes care will focus on glucose monitoring and diet control. If glucose is elevated following betamethasone impact, antihyperglycemic agents will need to start. Recommendations   1. POC glucose fasting and 1 hr post-prandial    2. Can adjust diet to provide more carbohydrates consider 45-60-60. OK to schedule strategic low carb snacks (15-20 grams CHO/snack) in the am/pm if hungry    3. Consider nutrition consult for diet education     Please encourage patient to participate in diabetes self care while in the hospital including allowing patient to use lancet for blood glucose monitoring. If fasting BG over 95 starting 11/14: Start 15 units NPH at bedtime (0.2 units/kg/dose)  If 2 hr post-prandial BG over 120 starting 11/14:  Start 15 units NPH at breakfast (0.2 units/kg/dose)      Diabetes Management Team to sign off at this point. Please re-consult us if patient needs change and/or seeking assistance in insulin adjustments if here next week. Thank you for including us in their care. Discharge Recommendations   1. Will need a FUV with maternal fetal medicine after hospital discharge for ongoing diabetes management. Can certainly schedule her with our endocrinologist, Edu Day MD here at [facility] for GDM- please let us know if you would like us to schedule. We can usually get patients in within about 2 weeks. 2. Prescription for glucometer kit (Meter, Lancets (100), Strips (100)). Patient to obtain a blood glucose reading four times daily. First thing in the morning prior to eating and drinking anything then 1 hr after lunch, dinner and at bedtime. Create a log and present to PCP for interpretation. 3. On Discharge, please place an order for \"diabetes education\" ([place]).   This will trigger a referral for the [facility] which includes outpatient education with a certified diabetes educator/nutritionist.    Billing Code(s)   [x] 69328  Before making these care recommendations, I personally reviewed the hosptialization record, including laboratory and diagnostic data, medications and examined the patient at bedside (circumstances permitting).   Total minutes: 13      Fredo Rodriguez CNS  Diabetes Clinical Nurse Specialist  [facility]  Access via [employer]

## 2021-11-10 LAB
ALBUMIN SERPL-MCNC: 3 G/DL (ref 3.5–5)
ALBUMIN/GLOB SERPL: 0.8 {RATIO} (ref 1.1–2.2)
ALP SERPL-CCNC: 86 U/L (ref 45–117)
ALT SERPL-CCNC: 36 U/L (ref 12–78)
ANION GAP SERPL CALC-SCNC: 5 MMOL/L (ref 5–15)
AST SERPL-CCNC: 18 U/L (ref 15–37)
BILIRUB SERPL-MCNC: 0.4 MG/DL (ref 0.2–1)
BUN SERPL-MCNC: 6 MG/DL (ref 6–20)
BUN/CREAT SERPL: 12 (ref 12–20)
CALCIUM SERPL-MCNC: 9.7 MG/DL (ref 8.5–10.1)
CHLORIDE SERPL-SCNC: 106 MMOL/L (ref 97–108)
CO2 SERPL-SCNC: 25 MMOL/L (ref 21–32)
CREAT SERPL-MCNC: 0.49 MG/DL (ref 0.55–1.02)
GLOBULIN SER CALC-MCNC: 3.9 G/DL (ref 2–4)
GLUCOSE BLD STRIP.AUTO-MCNC: 100 MG/DL (ref 65–117)
GLUCOSE BLD STRIP.AUTO-MCNC: 113 MG/DL (ref 65–117)
GLUCOSE BLD STRIP.AUTO-MCNC: 114 MG/DL (ref 65–117)
GLUCOSE BLD STRIP.AUTO-MCNC: 92 MG/DL (ref 65–117)
GLUCOSE SERPL-MCNC: 97 MG/DL (ref 65–100)
POTASSIUM SERPL-SCNC: 3.6 MMOL/L (ref 3.5–5.1)
PROT SERPL-MCNC: 6.9 G/DL (ref 6.4–8.2)
SERVICE CMNT-IMP: NORMAL
SODIUM SERPL-SCNC: 136 MMOL/L (ref 136–145)

## 2021-11-10 PROCEDURE — 74011250637 HC RX REV CODE- 250/637: Performed by: OBSTETRICS & GYNECOLOGY

## 2021-11-10 PROCEDURE — 36415 COLL VENOUS BLD VENIPUNCTURE: CPT

## 2021-11-10 PROCEDURE — 82962 GLUCOSE BLOOD TEST: CPT

## 2021-11-10 PROCEDURE — G0378 HOSPITAL OBSERVATION PER HR: HCPCS

## 2021-11-10 PROCEDURE — 80053 COMPREHEN METABOLIC PANEL: CPT

## 2021-11-10 PROCEDURE — 65410000002 HC RM PRIVATE OB

## 2021-11-10 PROCEDURE — 74011250637 HC RX REV CODE- 250/637: Performed by: ADVANCED PRACTICE MIDWIFE

## 2021-11-10 PROCEDURE — 59025 FETAL NON-STRESS TEST: CPT

## 2021-11-10 RX ADMIN — LABETALOL HYDROCHLORIDE 200 MG: 200 TABLET, FILM COATED ORAL at 08:42

## 2021-11-10 RX ADMIN — POTASSIUM BICARBONATE 20 MEQ: 391 TABLET, EFFERVESCENT ORAL at 10:03

## 2021-11-10 RX ADMIN — LABETALOL HYDROCHLORIDE 200 MG: 200 TABLET, FILM COATED ORAL at 20:24

## 2021-11-10 RX ADMIN — ASPIRIN 81 MG CHEWABLE TABLET 81 MG: 81 TABLET CHEWABLE at 08:42

## 2021-11-10 RX ADMIN — ACETAMINOPHEN 650 MG: 325 TABLET ORAL at 15:25

## 2021-11-10 RX ADMIN — POTASSIUM BICARBONATE 20 MEQ: 391 TABLET, EFFERVESCENT ORAL at 18:14

## 2021-11-10 RX ADMIN — NIFEDIPINE 90 MG: 30 TABLET, FILM COATED, EXTENDED RELEASE ORAL at 06:21

## 2021-11-10 NOTE — PROGRESS NOTES
Bedside and Verbal shift change report given to [address] (oncoming nurse) by Edgardo Mccarty RN (offgoing nurse). Report included the following information SBAR, Kardex, Procedure Summary, Intake/Output and MAR.

## 2021-11-10 NOTE — PROGRESS NOTES
Bedside and Verbal shift change report given to REBEL Velasquez RN (oncoming nurse) by Yolis Brizuela RN (offgoing nurse). Report included the following information SBAR and Kardex.

## 2021-11-10 NOTE — PROGRESS NOTES
Bedside and Verbal shift change report given to Cary Huff RN  (oncoming nurse) by Sheryle Co  (offgoing nurse). Report included the following information SBAR, Kardex, Intake/Output, MAR and Recent Results.

## 2021-11-10 NOTE — PROGRESS NOTES
Ante Partum Progress Note    Bridgertta Numbers  [id number]    Assessment/Plan: 29w6d     1. CHTN with SI PreE w/ SF:  -Labile BPs with increase in requirement for antihypertensives, now normal to mild range  -Currently on procardia 90 xl daily and labetalol 200 mg bid (added )  - No preE sx and labs stable except increase in proteinuria (baseline p/c 0.44, now 0.7)  - Remainder of labs stable, will plan repeating once weekly or more frequently should BPs or new sxs indicate this is necessary, preE labs due again by   - appreciate MFM consult -> will continue inpatient management for now, plan for magnesium if BPs escalate, plan delivery at 34 wks, plan for weekly BPP and f/u with MFM while in house, continue BID NSTs and close BP management     2. Prematurity  -  growth U/S EFW 1506g c/w 12%OTK, cephalic  - s/p BMZ x 2 /2   - s/p NICU consult  - GBS pending     3. GDM (new diagnosis) based on failed 3hr gtt while in hospital done prior to [address] course  - accuchecks  - see diabetic treatment team note from  for recommendations re: if BGs remain elevated by , but given BMZ course would hold on treatment prior to that time  - cont monthly growth scans     4. Hypokalemia - normal s/p repletions    5. BMI 35      Orders/Charges: High and Non Stress Test  X 2    Patient states she does have HA, vision changes, epigastric pain. Notes good FM. Denies LOF, VB and contractions. Frustrated and in low spirits given the likelihood of needing to stay inpatient until delivery.     Vitals:  Visit Vitals  /88 (BP 1 Location: Left arm, BP Patient Position: At rest;Sitting)   Pulse (!) 103   Temp 98.3 °F (36.8 °C)   Resp 16   SpO2 99%     Temp (24hrs), Av.3 °F (36.8 °C), Min:98 °F (36.7 °C), Max:98.5 °F (36.9 °C)      Last 24hr Input/Output:    Intake/Output Summary (Last 24 hours) at 11/10/2021 1126  Last data filed at 2021 2100  Gross per 24 hour   Intake 840 ml   Output    Net 840 ml        Non stress test:  Reactive    An NST was performed and was reactive. The baseline FHR was 150. Moderate baseline  variability was noted. Accelerations of sufficient amplitude and duration were noted. There were no decelerations noted. Uterine Activity: None     Exam:  Patient without distress. Abdomen, fundus soft non-tender     Extremities, no redness or tenderness               Additional Exam: Deferred    Labs:     Lab Results   Component Value Date/Time    WBC 8.3 11/07/2021 06:02 AM    WBC 10.9 11/05/2021 11:17 PM    WBC 10.0 11/02/2021 11:39 AM    HGB 11.0 (L) 11/07/2021 06:02 AM    HGB 12.1 11/05/2021 11:17 PM    HGB 11.7 11/02/2021 11:39 AM    HCT 32.8 (L) 11/07/2021 06:02 AM    HCT 35.9 11/05/2021 11:17 PM    HCT 34.9 (L) 11/02/2021 11:39 AM    PLATELET 759 32/41/2552 06:02 AM    PLATELET 128 24/36/3530 11:17 PM    PLATELET 901 61/40/9975 11:39 AM       Recent Results (from the past 24 hour(s))   GLUCOSE, POC    Collection Time: 11/09/21  3:17 PM   Result Value Ref Range    Glucose (POC) 118 (H) 65 - 117 mg/dL    Performed by Afia MIRANDA    GLUCOSE, POC    Collection Time: 11/09/21  8:43 PM   Result Value Ref Range    Glucose (POC) 127 (H) 65 - 117 mg/dL    Performed by [facility]    METABOLIC PANEL, COMPREHENSIVE    Collection Time: 11/10/21  4:04 AM   Result Value Ref Range    Sodium 136 136 - 145 mmol/L    Potassium 3.6 3.5 - 5.1 mmol/L    Chloride 106 97 - 108 mmol/L    CO2 25 21 - 32 mmol/L    Anion gap 5 5 - 15 mmol/L    Glucose 97 65 - 100 mg/dL    BUN 6 6 - 20 MG/DL    Creatinine 0.49 (L) 0.55 - 1.02 MG/DL    BUN/Creatinine ratio 12 12 - 20      GFR est AA >60 >60 ml/min/1.73m2    GFR est non-AA >60 >60 ml/min/1.73m2    Calcium 9.7 8.5 - 10.1 MG/DL    Bilirubin, total 0.4 0.2 - 1.0 MG/DL    ALT (SGPT) 36 12 - 78 U/L    AST (SGOT) 18 15 - 37 U/L    Alk.  phosphatase 86 45 - 117 U/L    Protein, total 6.9 6.4 - 8.2 g/dL    Albumin 3.0 (L) 3.5 - 5.0 g/dL    Globulin 3.9 2.0 - 4.0 g/dL A-G Ratio 0.8 (L) 1.1 - 2.2     GLUCOSE, POC    Collection Time: 11/10/21  6:28 AM   Result Value Ref Range    Glucose (POC) 92 65 - 117 mg/dL    Performed by Gwen River    GLUCOSE, POC    Collection Time: 11/10/21 10:47 AM   Result Value Ref Range    Glucose (POC) 100 65 - 117 mg/dL    Performed by Ana Maria Sharif

## 2021-11-11 LAB
GLUCOSE BLD STRIP.AUTO-MCNC: 100 MG/DL (ref 65–117)
GLUCOSE BLD STRIP.AUTO-MCNC: 80 MG/DL (ref 65–117)
GLUCOSE BLD STRIP.AUTO-MCNC: 90 MG/DL (ref 65–117)
GLUCOSE BLD STRIP.AUTO-MCNC: 90 MG/DL (ref 65–117)
SERVICE CMNT-IMP: NORMAL

## 2021-11-11 PROCEDURE — 74011250637 HC RX REV CODE- 250/637: Performed by: ADVANCED PRACTICE MIDWIFE

## 2021-11-11 PROCEDURE — 65410000002 HC RM PRIVATE OB

## 2021-11-11 PROCEDURE — 82962 GLUCOSE BLOOD TEST: CPT

## 2021-11-11 PROCEDURE — 74011250637 HC RX REV CODE- 250/637: Performed by: OBSTETRICS & GYNECOLOGY

## 2021-11-11 PROCEDURE — 59025 FETAL NON-STRESS TEST: CPT

## 2021-11-11 RX ADMIN — NIFEDIPINE 90 MG: 30 TABLET, FILM COATED, EXTENDED RELEASE ORAL at 06:39

## 2021-11-11 RX ADMIN — POTASSIUM BICARBONATE 20 MEQ: 391 TABLET, EFFERVESCENT ORAL at 19:03

## 2021-11-11 RX ADMIN — ASPIRIN 81 MG CHEWABLE TABLET 81 MG: 81 TABLET CHEWABLE at 08:36

## 2021-11-11 RX ADMIN — POTASSIUM BICARBONATE 20 MEQ: 391 TABLET, EFFERVESCENT ORAL at 08:36

## 2021-11-11 RX ADMIN — LABETALOL HYDROCHLORIDE 200 MG: 200 TABLET, FILM COATED ORAL at 20:15

## 2021-11-11 RX ADMIN — LABETALOL HYDROCHLORIDE 200 MG: 200 TABLET, FILM COATED ORAL at 08:36

## 2021-11-11 RX ADMIN — ACETAMINOPHEN 650 MG: 325 TABLET ORAL at 20:15

## 2021-11-11 NOTE — PROGRESS NOTES
Bedside and Verbal shift change report given to Oralia Edwards RN (oncoming nurse) by Leatha Ornelas RN (offgoing nurse). Report included the following information SBAR, Kardex, ED Summary, Intake/Output, MAR, Accordion and Recent Results.

## 2021-11-11 NOTE — PROGRESS NOTES
Ante Partum Progress Note    Drew Armstrong  30w0d    Assessment: 30w0d     1. CHTN with SI PreE w/ SF:  -Labile BPs with increase in requirement for antihypertensives, now normal to mild range  -Currently on procardia 90 xl daily and labetalol 200 mg bid (added )  - No preE sx and labs stable except increase in proteinuria (baseline p/c 0.44, now 0.7)  - Remainder of labs stable, will plan repeating once weekly or more frequently should BPs or new sxs indicate this is necessary  - labs  (I originally ordered for tomorrow, however, pt requests more time to recover from prior blood draw, as they have been very difficult for her)    - appreciate MFM consult -> will continue inpatient management for now, plan for magnesium if BPs escalate, plan delivery at 34 wks, plan for weekly BPP and f/u with MFM while in house, continue BID NSTs and close BP management     2. Prematurity  -  growth U/S EFW 1506g c/w 58%IGC, cephalic  - s/p BMZ x 2 75/5   - s/p NICU consult  - GBS pending ()____     3.  GDM (new diagnosis) based on failed 3hr gtt while in hospital done prior to [address] course  - accuchecks - currently normal range  - see diabetic treatment team note from  for recommendations re: if BGs remain elevated by , but given BMZ course would hold on treatment prior to that time  - cont monthly growth scans     4. Hypokalemia - normal s/p repletions     5. BMI 35        Orders/Charges: High and Non Stress Test  X 1      Patient states she has no new complaints. Intermittent HA (usually after labetalol,none now). No visual changes/RUQ pain.     Vitals:  Visit Vitals  BP (!) 151/85 (BP 1 Location: Left upper arm, BP Patient Position: At rest) Comment: rn aware   Pulse 91   Temp 98.2 °F (36.8 °C)   Resp 16   SpO2 99%     Temp (24hrs), Av.4 °F (36.9 °C), Min:97.6 °F (36.4 °C), Max:99.1 °F (37.3 °C)      Last 24hr Input/Output:  No intake or output data in the 24 hours ending 21 0855     Non stress test:  Non-reactive but appropriate for gestational age    Baseline 150s, mod variability, no decels    Uterine Activity: None     Exam:  Patient without distress.      Abdomen, fundus soft non-tender     Extremities, no redness or tenderness               Additional Exam: Deferred    Labs:     Lab Results   Component Value Date/Time    WBC 8.3 11/07/2021 06:02 AM    WBC 10.9 11/05/2021 11:17 PM    WBC 10.0 11/02/2021 11:39 AM    HGB 11.0 (L) 11/07/2021 06:02 AM    HGB 12.1 11/05/2021 11:17 PM    HGB 11.7 11/02/2021 11:39 AM    HCT 32.8 (L) 11/07/2021 06:02 AM    HCT 35.9 11/05/2021 11:17 PM    HCT 34.9 (L) 11/02/2021 11:39 AM    PLATELET 159 64/55/0435 06:02 AM    PLATELET 872 42/07/8734 11:17 PM    PLATELET 438 76/29/0953 11:39 AM       Recent Results (from the past 24 hour(s))   GLUCOSE, POC    Collection Time: 11/10/21 10:47 AM   Result Value Ref Range    Glucose (POC) 100 65 - 117 mg/dL    Performed by Madie Crawford    GLUCOSE, POC    Collection Time: 11/10/21  3:17 PM   Result Value Ref Range    Glucose (POC) 114 65 - 117 mg/dL    Performed by Madie Crawford    GLUCOSE, POC    Collection Time: 11/10/21  8:18 PM   Result Value Ref Range    Glucose (POC) 113 65 - 117 mg/dL    Performed by Paola Franklin 112 (CON)    GLUCOSE, POC    Collection Time: 11/11/21  6:37 AM   Result Value Ref Range    Glucose (POC) 90 65 - 117 mg/dL    Performed by Jane Jefferson

## 2021-11-11 NOTE — PROGRESS NOTES
Problem: Falls - Risk of  Goal: *Absence of Falls  Description: Document Melecio Ta Fall Risk and appropriate interventions in the flowsheet. Outcome: Progressing Towards Goal  Note: Fall Risk Interventions:            Medication Interventions: Teach patient to arise slowly                   Problem: Pressure Injury - Risk of  Goal: *Prevention of pressure injury  Description: Document Jos Scale and appropriate interventions in the flowsheet. Outcome: Progressing Towards Goal  Note: Pressure Injury Interventions:             Activity Interventions: Increase time out of bed         Nutrition Interventions: Document food/fluid/supplement intake                     Problem: Pain  Goal: *Control of Pain  Outcome: Progressing Towards Goal

## 2021-11-12 LAB
BACTERIA SPEC CULT: NORMAL
GLUCOSE BLD STRIP.AUTO-MCNC: 102 MG/DL (ref 65–117)
GLUCOSE BLD STRIP.AUTO-MCNC: 105 MG/DL (ref 65–117)
GLUCOSE BLD STRIP.AUTO-MCNC: 87 MG/DL (ref 65–117)
GLUCOSE BLD STRIP.AUTO-MCNC: 99 MG/DL (ref 65–117)
GRBS, EXTERNAL: NEGATIVE
SERVICE CMNT-IMP: NORMAL

## 2021-11-12 PROCEDURE — 74011250637 HC RX REV CODE- 250/637: Performed by: OBSTETRICS & GYNECOLOGY

## 2021-11-12 PROCEDURE — 65410000002 HC RM PRIVATE OB

## 2021-11-12 PROCEDURE — 74011250637 HC RX REV CODE- 250/637: Performed by: ADVANCED PRACTICE MIDWIFE

## 2021-11-12 PROCEDURE — 59025 FETAL NON-STRESS TEST: CPT

## 2021-11-12 PROCEDURE — 82962 GLUCOSE BLOOD TEST: CPT

## 2021-11-12 RX ADMIN — ASPIRIN 81 MG CHEWABLE TABLET 81 MG: 81 TABLET CHEWABLE at 08:21

## 2021-11-12 RX ADMIN — LABETALOL HYDROCHLORIDE 200 MG: 200 TABLET, FILM COATED ORAL at 22:00

## 2021-11-12 RX ADMIN — POTASSIUM BICARBONATE 20 MEQ: 391 TABLET, EFFERVESCENT ORAL at 19:06

## 2021-11-12 RX ADMIN — NIFEDIPINE 90 MG: 30 TABLET, FILM COATED, EXTENDED RELEASE ORAL at 06:53

## 2021-11-12 RX ADMIN — LABETALOL HYDROCHLORIDE 200 MG: 200 TABLET, FILM COATED ORAL at 08:21

## 2021-11-12 RX ADMIN — POTASSIUM BICARBONATE 20 MEQ: 391 TABLET, EFFERVESCENT ORAL at 08:22

## 2021-11-12 NOTE — PROGRESS NOTES
Ante Partum Progress Note    Gisselberhane Mascorro  30w1d    Assessment: 30w1d     1. CHTN with SI PreE w/ SF:  -Labile BPs with increase in requirement for antihypertensives, now normal to mild range  -Currently on procardia 90 xl daily and labetalol 200 mg bid (added )  - No preE sx and labs stable except increase in proteinuria (baseline p/c 0.44, now 0.7)  - Remainder of labs stable, will plan repeating once weekly or more frequently should BPs or new sxs indicate this is necessary  - labs  (I originally ordered for tomorrow, however, pt requests more time to recover from prior blood draw, as they have been very difficult for her)    - appreciate MFM consult -> will continue inpatient management for now, plan for magnesium if BPs escalate, plan delivery at 34 wks, plan for weekly BPP and f/u with MFM while in house, continue BID NSTs and close BP management     2. Prematurity  -  growth U/S EFW 1506g c/w 80%YWQ, cephalic  - s/p BMZ x 2    - s/p NICU consult  - GBS pending () neg     3.  GDM (new diagnosis) based on failed 3hr gtt while in hospital done prior to [address] course  - accuchecks - currently normal range  - see diabetic treatment team note from  for recommendations re: if BGs remain elevated by , but given BMZ course would hold on treatment prior to that time  - cont monthly growth scans     4. Hypokalemia - normal s/p repletions     5. BMI 35        Orders/Charges: High and Non Stress Test  X 1      Patient states she has no new complaints. Intermittent HA (usually after labetalol, none now). No visual changes/RUQ pain, ctx, vb. +FM. Feeling a little sad coping with staying in the hospital until delivery. Desires to work while here.      Vitals:  Visit Vitals  /86 (BP 1 Location: Left upper arm, BP Patient Position: At rest;Supine)   Pulse 96   Temp 98 °F (36.7 °C)   Resp 16   SpO2 98%     Temp (24hrs), Av.1 °F (36.7 °C), Min:98 °F (36.7 °C), Max:98.2 °F (36.8 °C)      Last 24hr Input/Output:  No intake or output data in the 24 hours ending 11/12/21 0818     Non stress test:  Non-reactive but appropriate for gestational age    Baseline 150s, mod variability, no decels    Uterine Activity: None     Exam:  Patient without distress.      Abdomen, fundus soft non-tender     Extremities, no redness or tenderness               Additional Exam: Deferred    Labs:     Lab Results   Component Value Date/Time    WBC 8.3 11/07/2021 06:02 AM    WBC 10.9 11/05/2021 11:17 PM    WBC 10.0 11/02/2021 11:39 AM    HGB 11.0 (L) 11/07/2021 06:02 AM    HGB 12.1 11/05/2021 11:17 PM    HGB 11.7 11/02/2021 11:39 AM    HCT 32.8 (L) 11/07/2021 06:02 AM    HCT 35.9 11/05/2021 11:17 PM    HCT 34.9 (L) 11/02/2021 11:39 AM    PLATELET 068 25/51/5239 06:02 AM    PLATELET 811 95/63/2086 11:17 PM    PLATELET 345 12/27/6560 11:39 AM       Recent Results (from the past 24 hour(s))   GLUCOSE, POC    Collection Time: 11/11/21 11:23 AM   Result Value Ref Range    Glucose (POC) 100 65 - 117 mg/dL    Performed by Ina LEONARDO (CON)    GLUCOSE, POC    Collection Time: 11/11/21  2:52 PM   Result Value Ref Range    Glucose (POC) 80 65 - 117 mg/dL    Performed by Ina LEONARDO (CON)    GLUCOSE, POC    Collection Time: 11/11/21  7:52 PM   Result Value Ref Range    Glucose (POC) 90 65 - 117 mg/dL    Performed by [address], POC    Collection Time: 11/12/21  6:54 AM   Result Value Ref Range    Glucose (POC) 87 65 - 117 mg/dL    Performed by Stephania Blanca RN

## 2021-11-12 NOTE — PROGRESS NOTES
Problem: Falls - Risk of  Goal: *Absence of Falls  Description: Document Daysi Deshpande Fall Risk and appropriate interventions in the flowsheet. Outcome: Progressing Towards Goal  Note: Fall Risk Interventions:            Medication Interventions: Teach patient to arise slowly                   Problem: Pressure Injury - Risk of  Goal: *Prevention of pressure injury  Description: Document Jos Scale and appropriate interventions in the flowsheet. Outcome: Progressing Towards Goal  Note: Pressure Injury Interventions:             Activity Interventions: Increase time out of bed         Nutrition Interventions: Document food/fluid/supplement intake                     Problem: Pain  Goal: *Control of Pain  Outcome: Progressing Towards Goal

## 2021-11-12 NOTE — PROGRESS NOTES
T.O.C:   Pt expected to d/c to home when medically stable; likely following delivery   Family to provide transport at d/c   Emergency Contact: Army Mai, spouse, [phone]      Pt with Chronic hypertension with PreE  with labile BP's. Pt now also gestational Diabetes. Pt remains in hospital with MFM consults weekly, expect delivery at 34 weeks. CM attempted several times to meet with pt but pt unavailable. CM to follow up later.     Ryne Mclain RN

## 2021-11-12 NOTE — PROGRESS NOTES
9878 Verbal shift change report given to Charanjit Ramirez RN (oncoming nurse) by Nathalie Floyd RN (offgoing nurse). Report included the following information SBAR and Kardex.

## 2021-11-12 NOTE — PROGRESS NOTES
Bedside and Verbal shift change report given to Meaghan Barlow RN (oncoming nurse) by Ivonne Freedman RN (offgoing nurse). Report included the following information SBAR, Kardex, ED Summary, OR Summary, Intake/Output, MAR, Accordion, Recent Results and Med Rec Status.        2150: NST completed, baby coming off monitor while having accelerations, 1 contraction traced but not felt

## 2021-11-12 NOTE — CONSULTS
Nutrition Note    Consult received and completed. Pt admitted for preeclampsia GA: 30w1d. Pt with new diagnosis of GDM and [facility] providing education and support. Reviewed hospital CHO menu with pt and . Agreeable to snacks and will gladly send preferable meals.    Continue to follow and gladly assist.       Electronically signed by Yuliana Ricketts RD on 11/12/2021 at 3:38 PM    Contact: Cali

## 2021-11-13 LAB
GLUCOSE BLD STRIP.AUTO-MCNC: 102 MG/DL (ref 65–117)
GLUCOSE BLD STRIP.AUTO-MCNC: 105 MG/DL (ref 65–117)
GLUCOSE BLD STRIP.AUTO-MCNC: 89 MG/DL (ref 65–117)
GLUCOSE BLD STRIP.AUTO-MCNC: 99 MG/DL (ref 65–117)
SERVICE CMNT-IMP: NORMAL

## 2021-11-13 PROCEDURE — 82962 GLUCOSE BLOOD TEST: CPT

## 2021-11-13 PROCEDURE — 74011250637 HC RX REV CODE- 250/637: Performed by: OBSTETRICS & GYNECOLOGY

## 2021-11-13 PROCEDURE — 74011250637 HC RX REV CODE- 250/637: Performed by: ADVANCED PRACTICE MIDWIFE

## 2021-11-13 PROCEDURE — 59025 FETAL NON-STRESS TEST: CPT

## 2021-11-13 PROCEDURE — 65410000002 HC RM PRIVATE OB

## 2021-11-13 RX ADMIN — NIFEDIPINE 90 MG: 30 TABLET, FILM COATED, EXTENDED RELEASE ORAL at 06:20

## 2021-11-13 RX ADMIN — POTASSIUM BICARBONATE 20 MEQ: 391 TABLET, EFFERVESCENT ORAL at 09:14

## 2021-11-13 RX ADMIN — LABETALOL HYDROCHLORIDE 200 MG: 200 TABLET, FILM COATED ORAL at 20:40

## 2021-11-13 RX ADMIN — POTASSIUM BICARBONATE 20 MEQ: 391 TABLET, EFFERVESCENT ORAL at 18:24

## 2021-11-13 RX ADMIN — LABETALOL HYDROCHLORIDE 200 MG: 200 TABLET, FILM COATED ORAL at 09:13

## 2021-11-13 RX ADMIN — ASPIRIN 81 MG CHEWABLE TABLET 81 MG: 81 TABLET CHEWABLE at 09:14

## 2021-11-13 NOTE — PROGRESS NOTES
Ante Partum Progress Note    Kin Herrera  [id number]    Assessment: 30w2d      1. CHTN with SI PreE w/ SF:  -Labile BPs with increase in requirement for antihypertensives, now normal to mild range  -Currently on procardia 90 xl daily and labetalol 200 mg BID  - nl labs  aside from proteinuria (baseline p/c 0.44, now 0.7)  - labs due 11/15  - appreciate MFM consult  -> will continue inpatient management for now, plan for magnesium if BPs escalate, plan delivery at 34 wks, plan for weekly BPP and f/u with MFM while in house, continue BID NSTs and close BP management  - repeat BPP 11/15     2. Prematurity  -  growth U/S EFW 1506g c/w 48%XAF, cephalic  - s/p BMZ x 2 65/6   - s/p NICU consult  - GBS neg      3.  GDM  - accuchecks - currently normal range  - see diabetic treatment team note from  for recommendations re: if BGs remain elevated by , but given BMZ course would hold on treatment prior to that time  - cont monthly growth scans      Plan:  Continue hospitalization with hospitalized bedrest, Ultrasound evaluation 11/15 and Laboratory evaluation: Blood work repeat weekly    Orders/Charges: High and Non Stress Test  X 2    Patient states she has no new complaints    Vitals:  Visit Vitals  BP (!) 141/86 (BP 1 Location: Left upper arm, BP Patient Position: At rest)   Pulse 94   Temp 98.1 °F (36.7 °C)   Resp 16   SpO2 98%     Temp (24hrs), Av.3 °F (36.8 °C), Min:98.1 °F (36.7 °C), Max:98.5 °F (36.9 °C)      Last 24hr Input/Output:  No intake or output data in the 24 hours ending 21 1053     Non stress test:  Reactive    An NST was performed and was reactive. The baseline FHR was 150. Moderate baseline  variability was noted. Accelerations of sufficient amplitude and duration were noted. There were no decelerations noted. Uterine Activity: None     Exam:  Patient without distress.      Abdomen, fundus soft non-tender     Extremities, no redness or tenderness Additional Exam: Deferred    Labs:     Lab Results   Component Value Date/Time    WBC 8.3 11/07/2021 06:02 AM    WBC 10.9 11/05/2021 11:17 PM    WBC 10.0 11/02/2021 11:39 AM    HGB 11.0 (L) 11/07/2021 06:02 AM    HGB 12.1 11/05/2021 11:17 PM    HGB 11.7 11/02/2021 11:39 AM    HCT 32.8 (L) 11/07/2021 06:02 AM    HCT 35.9 11/05/2021 11:17 PM    HCT 34.9 (L) 11/02/2021 11:39 AM    PLATELET 147 19/61/3542 06:02 AM    PLATELET 663 08/09/5684 11:17 PM    PLATELET 980 06/88/8335 11:39 AM       Recent Results (from the past 24 hour(s))   GLUCOSE, POC    Collection Time: 11/12/21 11:22 AM   Result Value Ref Range    Glucose (POC) 99 65 - 117 mg/dL    Performed by Carmela Downs    GLUCOSE, POC    Collection Time: 11/12/21  3:10 PM   Result Value Ref Range    Glucose (POC) 102 65 - 117 mg/dL    Performed by Sera [address] 308, POC    Collection Time: 11/12/21 10:05 PM   Result Value Ref Range    Glucose (POC) 105 65 - 117 mg/dL    Performed by Jose M Sandoval    GLUCOSE, POC    Collection Time: 11/13/21  6:18 AM   Result Value Ref Range    Glucose (POC) 99 65 - 117 mg/dL    Performed by Dread Olivas

## 2021-11-13 NOTE — PROGRESS NOTES
0800 Verbal shift change report given to Ella James RN (oncoming nurse) by Dae Trinidad RN (offgoing nurse). Report included the following information SBAR.

## 2021-11-14 LAB
GLUCOSE BLD STRIP.AUTO-MCNC: 102 MG/DL (ref 65–117)
GLUCOSE BLD STRIP.AUTO-MCNC: 119 MG/DL (ref 65–117)
GLUCOSE BLD STRIP.AUTO-MCNC: 145 MG/DL (ref 65–117)
GLUCOSE BLD STRIP.AUTO-MCNC: 87 MG/DL (ref 65–117)
SERVICE CMNT-IMP: ABNORMAL
SERVICE CMNT-IMP: ABNORMAL
SERVICE CMNT-IMP: NORMAL
SERVICE CMNT-IMP: NORMAL

## 2021-11-14 PROCEDURE — 65410000002 HC RM PRIVATE OB

## 2021-11-14 PROCEDURE — 82962 GLUCOSE BLOOD TEST: CPT

## 2021-11-14 PROCEDURE — 74011250637 HC RX REV CODE- 250/637: Performed by: ADVANCED PRACTICE MIDWIFE

## 2021-11-14 PROCEDURE — 74011250637 HC RX REV CODE- 250/637: Performed by: OBSTETRICS & GYNECOLOGY

## 2021-11-14 PROCEDURE — 59025 FETAL NON-STRESS TEST: CPT

## 2021-11-14 RX ADMIN — ASPIRIN 81 MG CHEWABLE TABLET 81 MG: 81 TABLET CHEWABLE at 08:48

## 2021-11-14 RX ADMIN — LABETALOL HYDROCHLORIDE 200 MG: 200 TABLET, FILM COATED ORAL at 08:48

## 2021-11-14 RX ADMIN — LABETALOL HYDROCHLORIDE 200 MG: 200 TABLET, FILM COATED ORAL at 20:02

## 2021-11-14 RX ADMIN — POTASSIUM BICARBONATE 20 MEQ: 391 TABLET, EFFERVESCENT ORAL at 08:49

## 2021-11-14 RX ADMIN — NIFEDIPINE 90 MG: 30 TABLET, FILM COATED, EXTENDED RELEASE ORAL at 06:33

## 2021-11-14 NOTE — PROGRESS NOTES
7999 Bedside shift change report given to Yolanda Castillo RN (oncoming nurse) by Harper Pulido RN (offgoing nurse). Report included the following information SBAR.

## 2021-11-14 NOTE — PROGRESS NOTES
Ante Partum Progress Note    Cyndia Najjar  30w3d    Assessment: 30w3d      1. CHTN with SI PreE w/ SF:  -Labile BPs with increase in requirement for antihypertensives on admission  - mild range BPs over the last 24hrs, will hold on med adjustments unless BPs >150/>100  -Currently on procardia 90 xl daily and labetalol 200 mg BID  - nl labs  aside from proteinuria (baseline p/c 0.44, now 0.7)  - labs due 11/15  - appreciate MFM consult  -> will continue inpatient management for now, plan for magnesium if BPs escalate, plan delivery at 34 wks, plan for weekly BPP and f/u with MFM while in house, continue BID NSTs and close BP management  - repeat BPP 11/15     2. Prematurity  -  growth U/S EFW 1506g c/w 88%GZO, cephalic  - s/p BMZ x 2    - s/p NICU consult  - GBS neg      3.  GDM  - accuchecks QID - good glycemic control  - see diabetic treatment team note from  for recommendations if elevated BGs and insulin management is needed  - cont monthly growth scans        Plan:  Continue hospitalization with hospitalized bedrest, Ultrasound evaluation 11/15 and Laboratory evaluation: Blood work repeat weekly     Orders/Charges: High and Non Stress Test  X 2     Patient states she has no new complaints. She has noticed a slight bump in BPs over the last 24hrs (by cuff readings, not symptoms) but denies HA, vision change, epigastric/chest pain. Notes good FM, no LOF/VB/contractions. Vitals:  Visit Vitals  BP (!) 146/86 (BP 1 Location: Left upper arm, BP Patient Position: At rest;Sitting)   Pulse 93   Temp 98.3 °F (36.8 °C)   Resp 16   SpO2 99%     Temp (24hrs), Av.6 °F (37 °C), Min:98.3 °F (36.8 °C), Max:99.1 °F (37.3 °C)      Last 24hr Input/Output:  No intake or output data in the 24 hours ending 21 1011     Non stress test:  Reactive  An NST was performed and was reactive. The baseline FHR was 150. Moderate baseline  variability was noted.  Accelerations of sufficient amplitude and duration were noted. There were no decelerations noted. Patient Vitals for the past 4 hrs: Mode Fetal Heart Rate Variability Decelerations Accelerations RN Reviewed Strip? Non Stress Test   11/14/21 0657 External 150 6-25 BPM None Yes Yes Reactive      Patient Vitals for the past 4 hrs: Mode Fetal Heart Rate Variability Decelerations Accelerations RN Reviewed Strip? Non Stress Test   11/14/21 0657 External 150 6-25 BPM None Yes Yes Reactive        Uterine Activity: None     Exam:  Patient without distress.      Abdomen, fundus soft non-tender     Extremities, no redness or tenderness               Additional Exam: Deferred    Labs:     Lab Results   Component Value Date/Time    WBC 8.3 11/07/2021 06:02 AM    WBC 10.9 11/05/2021 11:17 PM    WBC 10.0 11/02/2021 11:39 AM    HGB 11.0 (L) 11/07/2021 06:02 AM    HGB 12.1 11/05/2021 11:17 PM    HGB 11.7 11/02/2021 11:39 AM    HCT 32.8 (L) 11/07/2021 06:02 AM    HCT 35.9 11/05/2021 11:17 PM    HCT 34.9 (L) 11/02/2021 11:39 AM    PLATELET 200 51/34/7519 06:02 AM    PLATELET 577 55/69/5936 11:17 PM    PLATELET 140 21/13/6813 11:39 AM       Recent Results (from the past 24 hour(s))   GLUCOSE, POC    Collection Time: 11/13/21 11:50 AM   Result Value Ref Range    Glucose (POC) 102 65 - 117 mg/dL    Performed by Nazia Erickson Dr, POC    Collection Time: 11/13/21  2:57 PM   Result Value Ref Range    Glucose (POC) 89 65 - 117 mg/dL    Performed by Paola Franklin 112 (CON)    GLUCOSE, POC    Collection Time: 11/13/21  8:48 PM   Result Value Ref Range    Glucose (POC) 105 65 - 117 mg/dL    Performed by Jessica Ngo    GLUCOSE, POC    Collection Time: 11/14/21  6:32 AM   Result Value Ref Range    Glucose (POC) 87 65 - 117 mg/dL    Performed by Elvin John

## 2021-11-15 LAB
GLUCOSE BLD STRIP.AUTO-MCNC: 103 MG/DL (ref 65–117)
GLUCOSE BLD STRIP.AUTO-MCNC: 89 MG/DL (ref 65–117)
GLUCOSE BLD STRIP.AUTO-MCNC: 89 MG/DL (ref 65–117)
GLUCOSE BLD STRIP.AUTO-MCNC: 96 MG/DL (ref 65–117)
SERVICE CMNT-IMP: NORMAL

## 2021-11-15 PROCEDURE — 74011250637 HC RX REV CODE- 250/637: Performed by: ADVANCED PRACTICE MIDWIFE

## 2021-11-15 PROCEDURE — 74011250637 HC RX REV CODE- 250/637: Performed by: OBSTETRICS & GYNECOLOGY

## 2021-11-15 PROCEDURE — 65410000002 HC RM PRIVATE OB

## 2021-11-15 PROCEDURE — 82962 GLUCOSE BLOOD TEST: CPT

## 2021-11-15 PROCEDURE — 59025 FETAL NON-STRESS TEST: CPT

## 2021-11-15 RX ADMIN — POTASSIUM BICARBONATE 20 MEQ: 391 TABLET, EFFERVESCENT ORAL at 08:13

## 2021-11-15 RX ADMIN — LABETALOL HYDROCHLORIDE 200 MG: 200 TABLET, FILM COATED ORAL at 20:36

## 2021-11-15 RX ADMIN — NIFEDIPINE 90 MG: 30 TABLET, FILM COATED, EXTENDED RELEASE ORAL at 06:17

## 2021-11-15 RX ADMIN — ASPIRIN 81 MG CHEWABLE TABLET 81 MG: 81 TABLET CHEWABLE at 08:13

## 2021-11-15 RX ADMIN — POTASSIUM BICARBONATE 20 MEQ: 391 TABLET, EFFERVESCENT ORAL at 17:45

## 2021-11-15 RX ADMIN — LABETALOL HYDROCHLORIDE 200 MG: 200 TABLET, FILM COATED ORAL at 08:13

## 2021-11-15 NOTE — PROGRESS NOTES
CM to pt room to complete initial intervention. Pt not accepting visitors at present time. CM will attempt to see pt tomorrow.     Olga Tamayo RN

## 2021-11-15 NOTE — PROGRESS NOTES
Ante Partum Progress Note    Laurel Vazquez  [id number]    Assessment/Plan: 30w4d     1. CHTN with SI PreE w/ SF:  - Labile BPs with increase in requirement for antihypertensives on admission  - mild range BPs over the last 24hrs, will hold on med adjustments unless BPs >150/>100  -Currently on procardia 90 XL daily and labetalol 200 mg BID  - nl labs  aside from proteinuria (baseline p/c 0.44, now 0.7)  - labs due  (blood draw not able to be obtained today - consider NICU/PICU/L&D/anesthesia draw if needed)  - appreciate MFM consult  -> will continue inpatient management for now, plan for magnesium if BPs escalate, plan delivery at 34 wks, plan for weekly BPP and f/u with MFM while in house, continue BID NSTs and close BP management  - repeat BPP tomorrow  (MFM not in house today)     2. Prematurity  -  growth U/S EFW 1506g c/w 80%XHX, cephalic  - s/p BMZ x 2 44/4   - s/p NICU consult  - GBS neg      3.  GDM  - accuchecks QID - good glycemic control  - see diabetic treatment team note from  for recommendations if elevated BGs and insulin management is needed  - cont monthly growth scans      Orders/Charges: High and Non Stress Test  X 2    Patient states she has no new complaints    Vitals:  Visit Vitals  BP (!) 144/95 (BP 1 Location: Left upper arm, BP Patient Position: At rest)   Pulse (!) 109   Temp 98.6 °F (37 °C)   Resp 18   SpO2 99%     Temp (24hrs), Av.4 °F (36.9 °C), Min:98 °F (36.7 °C), Max:98.6 °F (37 °C)      Last 24hr Input/Output:  No intake or output data in the 24 hours ending 11/15/21 1134     Non stress test:  Reactive  An NST was performed and was reactive. The baseline FHR was 155. Moderate baseline  variability was noted. Accelerations of sufficient amplitude and duration were noted. There were no decelerations noted. Patient Vitals for the past 4 hrs: Mode Fetal Heart Rate Variability Decelerations Accelerations RN Reviewed Strip?  Non Stress Test 11/15/21 1039 External 155 6-25 BPM None Yes Yes Reactive      Patient Vitals for the past 4 hrs: Mode Fetal Heart Rate Variability Decelerations Accelerations RN Reviewed Strip? Non Stress Test   11/15/21 1039 External 155 6-25 BPM None Yes Yes Reactive        Uterine Activity: None     Exam:  Patient without distress.      Abdomen, fundus soft non-tender     Extremities, no redness or tenderness               Additional Exam: Deferred    Labs:     Lab Results   Component Value Date/Time    WBC 8.3 11/07/2021 06:02 AM    WBC 10.9 11/05/2021 11:17 PM    WBC 10.0 11/02/2021 11:39 AM    HGB 11.0 (L) 11/07/2021 06:02 AM    HGB 12.1 11/05/2021 11:17 PM    HGB 11.7 11/02/2021 11:39 AM    HCT 32.8 (L) 11/07/2021 06:02 AM    HCT 35.9 11/05/2021 11:17 PM    HCT 34.9 (L) 11/02/2021 11:39 AM    PLATELET 507 79/37/7728 06:02 AM    PLATELET 963 87/52/5187 11:17 PM    PLATELET 762 19/73/0811 11:39 AM       Recent Results (from the past 24 hour(s))   GLUCOSE, POC    Collection Time: 11/14/21  3:47 PM   Result Value Ref Range    Glucose (POC) 119 (H) 65 - 117 mg/dL    Performed by Nazia Erickson Dr, POC    Collection Time: 11/14/21  8:04 PM   Result Value Ref Range    Glucose (POC) 145 (H) 65 - 117 mg/dL    Performed by Nazia Erickson Dr, POC    Collection Time: 11/15/21  6:48 AM   Result Value Ref Range    Glucose (POC) 89 65 - 117 mg/dL    Performed by Jose M Sandoval    GLUCOSE, POC    Collection Time: 11/15/21 10:54 AM   Result Value Ref Range    Glucose (POC) 103 65 - 117 mg/dL    Performed by Dary Turcios. (CON)

## 2021-11-15 NOTE — PROGRESS NOTES
0745: Bedside and Verbal shift change report given to AURA (oncoming nurse) by AL Mitchell (offgoing nurse). Report included the following information SBAR.     0830: Night shift nurse reported that she attempted to complete lab draw x 2 this morning with no success; I attempted to complete lab draw; unsucessful. Alayna Marin RN, offered pt if she would like to also attempt, however pt and spouse refused and requested for a NICU nurse. Nurse contacted NICU nurse's station to request for assistant, however staff is busy at this time. Will continue to attempt to obtain lab. 6457: PICU nurse, Edith Atwood, was able to attempt assist nurse with lab draw, however was also unsuccessful. Pt's spouse and pt adamantly refused anymore lab draw at this time. 1100: Notified Dr. Tanesha Kim, Dr. Tanesha Kim okay to get labs (CBC, BMP) tomorrow instead. Pt specifically stated that they want NICU to get labs tomorrow.

## 2021-11-15 NOTE — PROGRESS NOTES
..Bedside and Verbal shift change report given to Gregorio Monroe RN (oncoming nurse) by Nancyann Cranker, RN (offgoing nurse). Report included the following information SBAR, Kardex, Intake/Output, MAR, Accordion, Recent Results and Med Rec Status.

## 2021-11-16 LAB
ALBUMIN SERPL-MCNC: 3 G/DL (ref 3.5–5)
ALBUMIN/GLOB SERPL: 0.8 {RATIO} (ref 1.1–2.2)
ALP SERPL-CCNC: 109 U/L (ref 45–117)
ALT SERPL-CCNC: 34 U/L (ref 12–78)
ANION GAP SERPL CALC-SCNC: 8 MMOL/L (ref 5–15)
AST SERPL-CCNC: 12 U/L (ref 15–37)
BILIRUB SERPL-MCNC: 0.7 MG/DL (ref 0.2–1)
BUN SERPL-MCNC: 5 MG/DL (ref 6–20)
BUN/CREAT SERPL: 10 (ref 12–20)
CALCIUM SERPL-MCNC: 10.3 MG/DL (ref 8.5–10.1)
CHLORIDE SERPL-SCNC: 102 MMOL/L (ref 97–108)
CO2 SERPL-SCNC: 20 MMOL/L (ref 21–32)
CREAT SERPL-MCNC: 0.5 MG/DL (ref 0.55–1.02)
ERYTHROCYTE [DISTWIDTH] IN BLOOD BY AUTOMATED COUNT: 13.2 % (ref 11.5–14.5)
GLOBULIN SER CALC-MCNC: 4 G/DL (ref 2–4)
GLUCOSE BLD STRIP.AUTO-MCNC: 102 MG/DL (ref 65–117)
GLUCOSE BLD STRIP.AUTO-MCNC: 78 MG/DL (ref 65–117)
GLUCOSE BLD STRIP.AUTO-MCNC: 97 MG/DL (ref 65–117)
GLUCOSE SERPL-MCNC: 95 MG/DL (ref 65–100)
HCT VFR BLD AUTO: 34.4 % (ref 35–47)
HGB BLD-MCNC: 11.2 G/DL (ref 11.5–16)
MCH RBC QN AUTO: 29.5 PG (ref 26–34)
MCHC RBC AUTO-ENTMCNC: 32.6 G/DL (ref 30–36.5)
MCV RBC AUTO: 90.5 FL (ref 80–99)
NRBC # BLD: 0 K/UL (ref 0–0.01)
NRBC BLD-RTO: 0 PER 100 WBC
PLATELET # BLD AUTO: 340 K/UL (ref 150–400)
PMV BLD AUTO: 10.2 FL (ref 8.9–12.9)
POTASSIUM SERPL-SCNC: 4.2 MMOL/L (ref 3.5–5.1)
PROT SERPL-MCNC: 7 G/DL (ref 6.4–8.2)
RBC # BLD AUTO: 3.8 M/UL (ref 3.8–5.2)
SERVICE CMNT-IMP: NORMAL
SODIUM SERPL-SCNC: 130 MMOL/L (ref 136–145)
WBC # BLD AUTO: 8.3 K/UL (ref 3.6–11)

## 2021-11-16 PROCEDURE — 74011250637 HC RX REV CODE- 250/637: Performed by: OBSTETRICS & GYNECOLOGY

## 2021-11-16 PROCEDURE — 74011250637 HC RX REV CODE- 250/637: Performed by: ADVANCED PRACTICE MIDWIFE

## 2021-11-16 PROCEDURE — 82962 GLUCOSE BLOOD TEST: CPT

## 2021-11-16 PROCEDURE — 36573 INSJ PICC RS&I 5 YR+: CPT | Performed by: OBSTETRICS & GYNECOLOGY

## 2021-11-16 PROCEDURE — 85027 COMPLETE CBC AUTOMATED: CPT

## 2021-11-16 PROCEDURE — 77030020365 HC SOL INJ SOD CL 0.9% 50ML

## 2021-11-16 PROCEDURE — 36415 COLL VENOUS BLD VENIPUNCTURE: CPT

## 2021-11-16 PROCEDURE — C1751 CATH, INF, PER/CENT/MIDLINE: HCPCS

## 2021-11-16 PROCEDURE — 76819 FETAL BIOPHYS PROFIL W/O NST: CPT | Performed by: OBSTETRICS & GYNECOLOGY

## 2021-11-16 PROCEDURE — 02HV33Z INSERTION OF INFUSION DEVICE INTO SUPERIOR VENA CAVA, PERCUTANEOUS APPROACH: ICD-10-PCS | Performed by: OBSTETRICS & GYNECOLOGY

## 2021-11-16 PROCEDURE — 76937 US GUIDE VASCULAR ACCESS: CPT

## 2021-11-16 PROCEDURE — 65410000002 HC RM PRIVATE OB

## 2021-11-16 PROCEDURE — 59025 FETAL NON-STRESS TEST: CPT

## 2021-11-16 PROCEDURE — 80053 COMPREHEN METABOLIC PANEL: CPT

## 2021-11-16 RX ADMIN — LABETALOL HYDROCHLORIDE 200 MG: 200 TABLET, FILM COATED ORAL at 08:48

## 2021-11-16 RX ADMIN — POTASSIUM BICARBONATE 20 MEQ: 391 TABLET, EFFERVESCENT ORAL at 18:41

## 2021-11-16 RX ADMIN — LABETALOL HYDROCHLORIDE 200 MG: 200 TABLET, FILM COATED ORAL at 20:27

## 2021-11-16 RX ADMIN — POTASSIUM BICARBONATE 20 MEQ: 391 TABLET, EFFERVESCENT ORAL at 08:48

## 2021-11-16 RX ADMIN — NIFEDIPINE 90 MG: 30 TABLET, FILM COATED, EXTENDED RELEASE ORAL at 06:27

## 2021-11-16 RX ADMIN — ASPIRIN 81 MG CHEWABLE TABLET 81 MG: 81 TABLET CHEWABLE at 08:48

## 2021-11-16 NOTE — PROGRESS NOTES
Bedside and Verbal shift change report given to Hector Edge RN (oncoming nurse) by Harini Salgado RN (offgoing nurse). Report included the following information SBAR, Kardex, ED Summary, Intake/Output, MAR, Accordion and Recent Results.      0300: Called NICU to attempt pt lab work, said they would see if someone was available    [phone]: called back and NICU not available    0625: PT requesting to wait until day shift to see if NICU is available for lab work

## 2021-11-16 NOTE — PROCEDURES
PICC Placement Note    PRE-PROCEDURE VERIFICATION  Correct Procedure: yes  Correct Site:  yes  Temperature: Temp: 98.1 °F (36.7 °C), Temperature Source: Temp Source: Oral  No results for input(s): BUN, CREA, PLT, INR, WBC, PLTEXT, INREXT in the last 72 hours. No lab exists for component: APTHR  Allergies: Sulfa (sulfonamide antibiotics)  Education materials, including PICC Booklet, for PICC Care given to patient: yes. See Patient Education activity for further details. PROCEDURE DETAIL  A double lumen PICC line was started for vascular access. The following documentation is in addition to the PICC properties in the lines/airways flowsheet :  Lot #: [id number]  Was xylocaine 1% used intradermally:  yes  Catheter Length: 38 (cm)  Vein Selection for PICC:right basilic  Central Line Bundle followed yes  Complication Related to Insertion: none    The placement was verified by ECG/[employer] technology: The  tip location is on the right side and the tip is in the  superior vena cava. See ECG results for PICC tip placement. Report given to nurse    Alfredo Stewart is okay to use.     Jason Fernandez RN

## 2021-11-16 NOTE — PROGRESS NOTES
Problem: Falls - Risk of  Goal: *Absence of Falls  Description: Document [place] Fall Risk and appropriate interventions in the flowsheet. Outcome: Progressing Towards Goal  Note: Fall Risk Interventions:            Medication Interventions: Teach patient to arise slowly                   Problem: Pressure Injury - Risk of  Goal: *Prevention of pressure injury  Description: Document Jos Scale and appropriate interventions in the flowsheet. Outcome: Progressing Towards Goal  Note: Pressure Injury Interventions:             Activity Interventions: Increase time out of bed         Nutrition Interventions: Document food/fluid/supplement intake                     Problem: Pain  Goal: *Control of Pain  Outcome: Progressing Towards Goal     Problem: Hypertensive Disorders of Pregnancy Care Plan (Gestational HTN, Preeclampsia, HELLP syndrome, Eclampsia, Chronic HTN, Superimposed Preeclamsia)  Goal: *Blood pressure within specified parameters  Outcome: Progressing Towards Goal

## 2021-11-16 NOTE — PROGRESS NOTES
Ante Partum Progress Note    Drew Armstrong  30w5d       1. CHTN with SI PreE w/ SF:  - Labile BPs with increase in requirement for antihypertensives on admission  - mild range BPs over the last 24hrs, will hold on med adjustments unless BPs >150/>100  -Currently on procardia 90 XL daily and labetalol 200 mg BID  - nl labs  aside from proteinuria (baseline p/c 0.44, now 0.7)  - labs  WNL  - appreciate MFM consult  -> will continue inpatient management for now, plan for magnesium if BPs escalate, plan delivery at 34 wks, plan for weekly BPP and f/u with MFM while in house, continue BID NSTs and close BP management  - repeat BPP tomorrow  (MFM not in house today)     2. Prematurity  -  growth U/S EFW 1506g c/w 79%ZBB, cephalic  - s/p BMZ x 2    - s/p NICU consult  - GBS neg      3.  GDM  - accuchecks QID - good glycemic control  - see diabetic treatment team note from  for recommendations if elevated BGs and insulin management is needed  - cont monthly growth scans    4. Venous Access  - Reviewed frustrations. - Reviewed benefits of more stable access that would allow for blood draws and administration of IV medications in acute situations.  - Recommend PICC line and pt in agreement  - Will notify vascular access team to place.         Orders/Charges: High and Non Stress Test  X 2     Patient denies HA, RUQ pain or visual changes. Having some routine pregnancy aches and pains. Frustrated with difficulty getting blood drawn -- was supposed to have labs done yesterday but unable to do due to inability to access veins. Wondering if she should try to deliver elsewhere.       Vitals:  Visit Vitals  /83 (BP 1 Location: Left upper arm, BP Patient Position: At rest)   Pulse 98   Temp 98.1 °F (36.7 °C)   Resp 18   SpO2 98%     Temp (24hrs), Av.4 °F (36.9 °C), Min:98.1 °F (36.7 °C), Max:98.6 °F (37 °C)      Last 24hr Input/Output:  No intake or output data in the 24 hours ending 11/16/21 1727     Non stress test:  Baseline 155, + 10 x10 accels no decels moderate variability  No data found. No data found. Uterine Activity: None     Exam:  Patient without distress.      Abdomen, fundus soft non-tender     Extremities, no redness or tenderness               Additional Exam: Deferred    Labs:     Lab Results   Component Value Date/Time    WBC 8.3 11/16/2021 10:52 AM    WBC 8.3 11/07/2021 06:02 AM    WBC 10.9 11/05/2021 11:17 PM    WBC 10.0 11/02/2021 11:39 AM    HGB 11.2 (L) 11/16/2021 10:52 AM    HGB 11.0 (L) 11/07/2021 06:02 AM    HGB 12.1 11/05/2021 11:17 PM    HGB 11.7 11/02/2021 11:39 AM    HCT 34.4 (L) 11/16/2021 10:52 AM    HCT 32.8 (L) 11/07/2021 06:02 AM    HCT 35.9 11/05/2021 11:17 PM    HCT 34.9 (L) 11/02/2021 11:39 AM    PLATELET 967 11/02/7546 10:52 AM    PLATELET 108 18/24/5503 06:02 AM    PLATELET 924 75/64/5705 11:17 PM    PLATELET 122 65/68/1869 11:39 AM       Recent Results (from the past 24 hour(s))   GLUCOSE, POC    Collection Time: 11/15/21  7:51 PM   Result Value Ref Range    Glucose (POC) 96 65 - 117 mg/dL    Performed by Sakina Joaquin    GLUCOSE, POC    Collection Time: 11/16/21  6:25 AM   Result Value Ref Range    Glucose (POC) 78 65 - 117 mg/dL    Performed by Bessie Matta    CBC W/O DIFF    Collection Time: 11/16/21 10:52 AM   Result Value Ref Range    WBC 8.3 3.6 - 11.0 K/uL    RBC 3.80 3.80 - 5.20 M/uL    HGB 11.2 (L) 11.5 - 16.0 g/dL    HCT 34.4 (L) 35.0 - 47.0 %    MCV 90.5 80.0 - 99.0 FL    MCH 29.5 26.0 - 34.0 PG    MCHC 32.6 30.0 - 36.5 g/dL    RDW 13.2 11.5 - 14.5 %    PLATELET 165 748 - 172 K/uL    MPV 10.2 8.9 - 12.9 FL    NRBC 0.0 0  WBC    ABSOLUTE NRBC 0.00 0.00 - 9.72 K/uL   METABOLIC PANEL, COMPREHENSIVE    Collection Time: 11/16/21 10:52 AM   Result Value Ref Range    Sodium 130 (L) 136 - 145 mmol/L    Potassium 4.2 3.5 - 5.1 mmol/L    Chloride 102 97 - 108 mmol/L    CO2 20 (L) 21 - 32 mmol/L    Anion gap 8 5 - 15 mmol/L    Glucose 95 65 - 100 mg/dL    BUN 5 (L) 6 - 20 MG/DL    Creatinine 0.50 (L) 0.55 - 1.02 MG/DL    BUN/Creatinine ratio 10 (L) 12 - 20      GFR est AA >60 >60 ml/min/1.73m2    GFR est non-AA >60 >60 ml/min/1.73m2    Calcium 10.3 (H) 8.5 - 10.1 MG/DL    Bilirubin, total 0.7 0.2 - 1.0 MG/DL    ALT (SGPT) 34 12 - 78 U/L    AST (SGOT) 12 (L) 15 - 37 U/L    Alk.  phosphatase 109 45 - 117 U/L    Protein, total 7.0 6.4 - 8.2 g/dL    Albumin 3.0 (L) 3.5 - 5.0 g/dL    Globulin 4.0 2.0 - 4.0 g/dL    A-G Ratio 0.8 (L) 1.1 - 2.2     GLUCOSE, POC    Collection Time: 11/16/21 11:59 AM   Result Value Ref Range    Glucose (POC) 97 65 - 117 mg/dL    Performed by Rosey Vilchis

## 2021-11-16 NOTE — PROGRESS NOTES
Indication: Severe Pre-Krbzeofnh6dy Tri O14.13.   ____________________________________________________________________________  History: Age: 31 years. : 1 Para: 0.   Current Pregnancy: Blood group: O Rhesus D-positive. Pre- pregnancy data: Weight 165 lbs. Height 5 ft 1 ins. BMI 31.2.  ____________________________________________________________________________  Dating:  Stated EDC:  EDC: 22 GA by stated EDC: 30w5d  Best Overall Assessment: 21 EDC: 22 Assessed GA: 30w5d  The Best Overall Assessment is based on the stated EDC.  ____________________________________________________________________________  Anatomy Scan:  Garcia gestation. Fetal heart activity: present. Fetal heart rate: 154 bpm.   Fetal presentation: cephalic. Cord: 3 vessels. Placenta: anterior. Fetal Anatomy:  Visualized with normal appearance: chest, gastrointestinal tract, kidneys, bladder. Heart: Limited views. Summary of Ultrasound Findings:  Transabdominal US. U/S machine: Fliqq E8 Expert. U/S view: Sufficient.     ____________________________________________________________________________  Fetal Wellbeing Assessment:  Amniotic fluid: normal. RITA: 18.3 cm. MVP: 5.5 cm. Q1: 5.5 cm. Q2: 3.0 cm. Q3: 4.7 cm. Q4: 5.1 cm. Biophysical Profile: Fetal body movements: normal (2), Fetal tone: normal (2), Fetal breathing movements: normal (2), Amniotic fluid volume: normal (2). Score 8 / 8. Summary: Reassuring fetal status. ____________________________________________________________________________  Doppler:  Fetal Doppler:  Umbilical Artery: PS 34.3 cm/s    ED 11.70 cm/s    S/D ratio 2.52     RI 0.60     PI 0.94     TAMX 19.04 cm/s     U/S Machine: Fliqq E8 Expert.  ____________________________________________________________________________  Report Summary:  Impression: Ms. Aries Bird is currently admitted for super-imposed severe pre-eclampsia.  Her need for medications increased last week from nifedipine 30XL qd to 90 XL qd plus labetalol 200 bid, and her proteinuria increased from 0.4 to 0.7. BP has been overall stable over the past week. She denies any HA or other sx of pre-e. Pre-e labs today show hgb 11.2, plt 340, crea 0.5, ast 12. Today we see a cephalic SLIUP with normal fluid. BPP is 8/8. Reassuring fetal surveillance. Recommendations: Cont inpatient management of superimposed severe pre-eclampsia. Repeat BPP in 1 wk.

## 2021-11-17 LAB
GLUCOSE BLD STRIP.AUTO-MCNC: 119 MG/DL (ref 65–117)
GLUCOSE BLD STRIP.AUTO-MCNC: 119 MG/DL (ref 65–117)
GLUCOSE BLD STRIP.AUTO-MCNC: 89 MG/DL (ref 65–117)
GLUCOSE BLD STRIP.AUTO-MCNC: 94 MG/DL (ref 65–117)
SERVICE CMNT-IMP: ABNORMAL
SERVICE CMNT-IMP: ABNORMAL
SERVICE CMNT-IMP: NORMAL
SERVICE CMNT-IMP: NORMAL

## 2021-11-17 PROCEDURE — 65410000002 HC RM PRIVATE OB

## 2021-11-17 PROCEDURE — 59025 FETAL NON-STRESS TEST: CPT

## 2021-11-17 PROCEDURE — 82962 GLUCOSE BLOOD TEST: CPT

## 2021-11-17 PROCEDURE — 74011250637 HC RX REV CODE- 250/637: Performed by: OBSTETRICS & GYNECOLOGY

## 2021-11-17 PROCEDURE — 74011250637 HC RX REV CODE- 250/637: Performed by: ADVANCED PRACTICE MIDWIFE

## 2021-11-17 RX ADMIN — NIFEDIPINE 90 MG: 30 TABLET, FILM COATED, EXTENDED RELEASE ORAL at 06:50

## 2021-11-17 RX ADMIN — ASPIRIN 81 MG CHEWABLE TABLET 81 MG: 81 TABLET CHEWABLE at 08:23

## 2021-11-17 RX ADMIN — LABETALOL HYDROCHLORIDE 200 MG: 200 TABLET, FILM COATED ORAL at 08:23

## 2021-11-17 RX ADMIN — POTASSIUM BICARBONATE 20 MEQ: 391 TABLET, EFFERVESCENT ORAL at 08:23

## 2021-11-17 RX ADMIN — ACETAMINOPHEN 650 MG: 325 TABLET ORAL at 08:53

## 2021-11-17 RX ADMIN — LABETALOL HYDROCHLORIDE 200 MG: 200 TABLET, FILM COATED ORAL at 21:14

## 2021-11-17 RX ADMIN — POTASSIUM BICARBONATE 20 MEQ: 391 TABLET, EFFERVESCENT ORAL at 18:36

## 2021-11-17 NOTE — PROGRESS NOTES
Problem: Falls - Risk of  Goal: *Absence of Falls  Description: Document Eduardo Shen Fall Risk and appropriate interventions in the flowsheet. Outcome: Progressing Towards Goal  Note: Fall Risk Interventions:            Medication Interventions: Teach patient to arise slowly                   Problem: Patient Education: Go to Patient Education Activity  Goal: Patient/Family Education  Outcome: Progressing Towards Goal     Problem: Pressure Injury - Risk of  Goal: *Prevention of pressure injury  Description: Document Jos Scale and appropriate interventions in the flowsheet. Outcome: Progressing Towards Goal  Note: Pressure Injury Interventions:             Activity Interventions: Increase time out of bed         Nutrition Interventions: Document food/fluid/supplement intake, Offer support with meals,snacks and hydration                     Problem: Patient Education: Go to Patient Education Activity  Goal: Patient/Family Education  Outcome: Progressing Towards Goal     Problem: Pain  Goal: *Control of Pain  Outcome: Progressing Towards Goal     Problem: Patient Education: Go to Patient Education Activity  Goal: Patient/Family Education  Outcome: Progressing Towards Goal     Problem: Discharge Planning  Goal: *Discharge to safe environment  Outcome: Progressing Towards Goal  Goal: *Knowledge of medication management  Outcome: Progressing Towards Goal  Goal: *Knowledge of discharge instructions  Outcome: Progressing Towards Goal     Problem: Patient Education: Go to Patient Education Activity  Goal: Patient/Family Education  Outcome: Progressing Towards Goal     Problem: Hypertensive Disorders of Pregnancy Care Plan (Gestational HTN, Preeclampsia, HELLP syndrome, Eclampsia, Chronic HTN, Superimposed Preeclamsia)  Goal: *Blood pressure within specified parameters  Outcome: Progressing Towards Goal  Goal: *Fluid volume balance  Outcome: Progressing Towards Goal  Goal: *Labs within defined limits  Outcome: Progressing Towards Goal  Goal: *Reassuring fetal surveillance  Outcome: Progressing Towards Goal  Goal: *Remains free of seizures  Outcome: Progressing Towards Goal     Problem: Patient Education: Go to Patient Education Activity  Goal: Patient/Family Education  Outcome: Progressing Towards Goal     Problem: Patient Education: Go to Patient Education Activity  Goal: Patient/Family Education  Outcome: Progressing Towards Goal     Problem: Antepartum: Day 1 through Discharge  Goal: Off Pathway (Use only if patient is Off Pathway)  Outcome: Progressing Towards Goal  Goal: Activity/Safety  Outcome: Progressing Towards Goal  Goal: Consults, if ordered  Outcome: Progressing Towards Goal  Goal: Diagnostic Test/Procedures  Outcome: Progressing Towards Goal  Goal: Nutrition/Diet  Outcome: Progressing Towards Goal  Goal: Discharge Planning  Outcome: Progressing Towards Goal  Goal: Medications  Outcome: Progressing Towards Goal  Goal: Treatments/Interventions/Procedures  Outcome: Progressing Towards Goal  Goal: Psychosocial  Outcome: Progressing Towards Goal  Goal: *Vital signs within defined limits  Outcome: Progressing Towards Goal  Goal: *Labs within defined limits  Outcome: Progressing Towards Goal  Goal: *Hemodynamically stable  Outcome: Progressing Towards Goal  Goal: *Optimal pain control at patient's stated goal  Outcome: Progressing Towards Goal  Goal: *Tolerating diet  Outcome: Progressing Towards Goal  Goal: *Performs self perineal care  Outcome: Progressing Towards Goal  Goal: *Reassuring fetal surveillance  Outcome: Progressing Towards Goal  Goal: *Able to cope  Outcome: Progressing Towards Goal  Goal: *Absence of injury  Outcome: Progressing Towards Goal  Goal: *Free from active signs of labor  Outcome: Progressing Towards Goal     Problem: Antepartum: Discharge Outcomes  Goal: *Free from active signs of labor  Outcome: Progressing Towards Goal  Goal: *Absence of injury  Outcome: Progressing Towards Goal  Goal: *Able to cope  Outcome: Progressing Towards Goal  Goal: *Reassuring fetal surveillance  Outcome: Progressing Towards Goal  Goal: *Describes available resources and support systems  Outcome: Progressing Towards Goal  Goal: *No signs and symptoms of infection  Outcome: Progressing Towards Goal  Goal: *Received and verbalizes understanding of discharge plan and instructions  Outcome: Progressing Towards Goal  Goal: *Vital signs within defined limits  Outcome: Progressing Towards Goal  Goal: *Labs within defined limits  Outcome: Progressing Towards Goal  Goal: *Hemodynamically stable  Outcome: Progressing Towards Goal  Goal: *Optimal pain control at patient's stated goal  Outcome: Progressing Towards Goal  Goal: *Demonstrates progressive activity  Outcome: Progressing Towards Goal  Goal: *Tolerating diet  Outcome: Progressing Towards Goal  Goal: *Verbalizes name, dosage, time, side effects, and number of days to continue medications  Outcome: Progressing Towards Goal

## 2021-11-17 NOTE — PROGRESS NOTES
Bedside and Verbal shift change report given to Kellen Garvey RN (oncoming nurse) by Josefina Allen RN (offgoing nurse). Report included the following information SBAR, Kardex, Intake/Output, MAR and Recent Results.

## 2021-11-17 NOTE — PROGRESS NOTES
0730:  Bedside and Verbal shift change report given to Filippo Burt RN and Jsoe Carson SRN (oncoming nurse) by Barbara Hutchins RN (offgoing nurse). Report included the following information SBAR, Kardex, MAR and Recent Results. Faculty or Preceptor Review     The documentation of patient care has been reviewed and approved.      Yassine Ocampo

## 2021-11-17 NOTE — PROGRESS NOTES
Problem: Falls - Risk of  Goal: *Absence of Falls  Description: Document Isaias Orosco Fall Risk and appropriate interventions in the flowsheet. Outcome: Progressing Towards Goal  Note: Fall Risk Interventions:            Medication Interventions: Teach patient to arise slowly                   Problem: Pressure Injury - Risk of  Goal: *Prevention of pressure injury  Description: Document Jos Scale and appropriate interventions in the flowsheet. Outcome: Progressing Towards Goal  Note: Pressure Injury Interventions:             Activity Interventions: Increase time out of bed         Nutrition Interventions: Document food/fluid/supplement intake, Offer support with meals,snacks and hydration                     Problem: Pain  Goal: *Control of Pain  Outcome: Progressing Towards Goal

## 2021-11-17 NOTE — ROUTINE PROCESS
2728: Bedside and Verbal shift change report given to MELINDA Strauss RN (oncoming nurse) by Vero Aquino RN (offgoing nurse). Report included the following information SBAR, Kardex, Intake/Output, MAR and Recent Results. 1225: Verbal shift change report given to Robbie Miller (oncoming nurse) by Graceann Dubin. Kumar Strauss RN (offgoing nurse). Report included the following information SBAR, ED Summary, Intake/Output, MAR and Recent Results.

## 2021-11-17 NOTE — PROGRESS NOTES
Bedside and Verbal shift change report given to Queen Jamie RN (oncoming nurse) by La Gar RN (offgoing nurse). Report included the following information SBAR, Kardex, ED Summary, Intake/Output, MAR, Accordion and Recent Results.

## 2021-11-17 NOTE — PROGRESS NOTES
Spiritual Care Assessment/Progress Note  [facility]      NAME: Meg Heredia      MRN: [id number]  AGE: 31 y.o. SEX: female  Mu-ism Affiliation: No preference   Language: English     11/17/2021     Total Time (in minutes): 20     Spiritual Assessment begun in [address] through conversation with:         [x]Patient        [x] Family    [] Friend(s)        Reason for Consult: Initial/Spiritual assessment, patient floor     Spiritual beliefs: (Please include comment if needed)     [] Identifies with a crystal tradition:         [] Supported by a crystal community:            [] Claims no spiritual orientation:           [] Seeking spiritual identity:                [] Adheres to an individual form of spirituality:           [x] Not able to assess:                           Identified resources for coping:      [] Prayer                               [] Music                  [] Guided Imagery     [x] Family/friends                 [] Pet visits     [] Devotional reading                         [] Unknown     [] Other:                                            Interventions offered during this visit: (See comments for more details)    Patient Interventions: Affirmation of emotions/emotional suffering, Catharsis/review of pertinent events in supportive environment, Coping skills reviewed/reinforced, Initial/Spiritual assessment, patient floor, Normalization of emotional/spiritual concerns     Family/Friend(s):  Affirmation of emotions/emotional suffering, Normalization of emotional/spiritual concerns     Plan of Care:     [] Support spiritual and/or cultural needs    [] Support AMD and/or advance care planning process      [] Support grieving process   [] Coordinate Rites and/or Rituals    [] Coordination with community clergy   [] No spiritual needs identified at this time   [] Detailed Plan of Care below (See Comments)  [] Make referral to Music Therapy  [] Make referral to Pet Therapy [] Make referral to Addiction services  [] Make referral to [facility]  [] Make referral to Spiritual Care Partner  [] No future visits requested        [x] Contact Spiritual Care for further referrals     Comments:  for initial visit. Pt was sitting up on the edge of the bed and family member present in the room. They did not have any needs at this time, though let them know of  availability. Provided pastoral listening and support. Please contact [phone] [facility] for further support.      [address] Dana Silver, MACE   [phone])

## 2021-11-18 LAB
GLUCOSE BLD STRIP.AUTO-MCNC: 107 MG/DL (ref 65–117)
GLUCOSE BLD STRIP.AUTO-MCNC: 94 MG/DL (ref 65–117)
GLUCOSE BLD STRIP.AUTO-MCNC: 95 MG/DL (ref 65–117)
GLUCOSE BLD STRIP.AUTO-MCNC: 97 MG/DL (ref 65–117)
SERVICE CMNT-IMP: NORMAL

## 2021-11-18 PROCEDURE — 82962 GLUCOSE BLOOD TEST: CPT

## 2021-11-18 PROCEDURE — 74011250637 HC RX REV CODE- 250/637: Performed by: ADVANCED PRACTICE MIDWIFE

## 2021-11-18 PROCEDURE — 74011250637 HC RX REV CODE- 250/637: Performed by: OBSTETRICS & GYNECOLOGY

## 2021-11-18 PROCEDURE — 59025 FETAL NON-STRESS TEST: CPT

## 2021-11-18 PROCEDURE — 65410000002 HC RM PRIVATE OB

## 2021-11-18 RX ADMIN — POTASSIUM BICARBONATE 20 MEQ: 391 TABLET, EFFERVESCENT ORAL at 09:54

## 2021-11-18 RX ADMIN — POTASSIUM BICARBONATE 20 MEQ: 391 TABLET, EFFERVESCENT ORAL at 18:20

## 2021-11-18 RX ADMIN — ASPIRIN 81 MG CHEWABLE TABLET 81 MG: 81 TABLET CHEWABLE at 09:53

## 2021-11-18 RX ADMIN — LABETALOL HYDROCHLORIDE 200 MG: 200 TABLET, FILM COATED ORAL at 20:36

## 2021-11-18 RX ADMIN — LABETALOL HYDROCHLORIDE 200 MG: 200 TABLET, FILM COATED ORAL at 09:53

## 2021-11-18 RX ADMIN — NIFEDIPINE 90 MG: 30 TABLET, FILM COATED, EXTENDED RELEASE ORAL at 06:19

## 2021-11-18 NOTE — PROGRESS NOTES
Ante Partum Progress Note    Yuli Stahl  [id number]    Assessment: 30w6d      1. CHTN with SI PreE w/ SF:  - Labile BPs with increase in requirement for antihypertensives on admission  - BPs now normotensive to mildly hypertensive, currently on Procardia 90 XL daily and Labetalol 200 mg BID  - normal labs on admission except for proteinuria (baseline p/c 0.44, now 0.7)  - appreciate MFM consult  -> will continue inpatient management for now, plan for magnesium if BPs escalate, plan delivery at 34 wks, plan for weekly BPP and f/u with MFM while in house, continue BID NSTs and close BP management     2. Prematurity  -  growth U/S EFW 1506g c/w 76%INJ, cephalic  - s/p BMZ x 2 54/6   - s/p NICU consult  - GBS neg      3.  GDM  - accuchecks QID - good glycemic control  - see diabetic treatment team note from  for recommendations if elevated BGs and insulin management is needed  - cont monthly growth scans     4. Venous Access  - s/p PICC line placement     Orders/Charges: High and Non Stress Test  X 2    Subjective:  Patient reports she's feeling better today. Baby is very active. She denies HA, change in vision RUQ pain. She has no concerns. Vitals:  Visit Vitals  BP (!) 140/92 (BP 1 Location: Left upper arm, BP Patient Position: Sitting)   Pulse (!) 102   Temp 98.1 °F (36.7 °C)   Resp 18   SpO2 99%     Temp (24hrs), Av.2 °F (36.8 °C), Min:98 °F (36.7 °C), Max:98.6 °F (37 °C)      Last 24hr Input/Output:  No intake or output data in the 24 hours ending 21 2215     Non stress test:      PM:  150, moderate variability, 10x10 accelerations, no decelerations  [place]: no contractions     AM:  145, moderate variability, positive accelerations, no decelerations  Tocometry: no contractions      Exam:  Patient without distress.      Abdomen, fundus soft non-tender     Extremities, no redness or tenderness               Labs:     Lab Results   Component Value Date/Time    WBC 8.3 11/16/2021 10:52 AM    WBC 8.3 11/07/2021 06:02 AM    WBC 10.9 11/05/2021 11:17 PM    WBC 10.0 11/02/2021 11:39 AM    HGB 11.2 (L) 11/16/2021 10:52 AM    HGB 11.0 (L) 11/07/2021 06:02 AM    HGB 12.1 11/05/2021 11:17 PM    HGB 11.7 11/02/2021 11:39 AM    HCT 34.4 (L) 11/16/2021 10:52 AM    HCT 32.8 (L) 11/07/2021 06:02 AM    HCT 35.9 11/05/2021 11:17 PM    HCT 34.9 (L) 11/02/2021 11:39 AM    PLATELET 535 37/65/9094 10:52 AM    PLATELET 142 62/80/3032 06:02 AM    PLATELET 948 85/05/0100 11:17 PM    PLATELET 820 08/48/9522 11:39 AM       Recent Results (from the past 24 hour(s))   GLUCOSE, POC    Collection Time: 11/17/21  6:41 AM   Result Value Ref Range    Glucose (POC) 94 65 - 117 mg/dL    Performed by Bessie Matta    GLUCOSE, POC    Collection Time: 11/17/21 10:36 AM   Result Value Ref Range    Glucose (POC) 119 (H) 65 - 117 mg/dL    Performed by [facility] [address], POC    Collection Time: 11/17/21  3:05 PM   Result Value Ref Range    Glucose (POC) 119 (H) 65 - 117 mg/dL    Performed by Paola Franklin 112 (CON)    GLUCOSE, POC    Collection Time: 11/17/21  8:14 PM   Result Value Ref Range    Glucose (POC) 89 65 - 117 mg/dL    Performed by Paola Franklin 112 (CON)

## 2021-11-18 NOTE — PROGRESS NOTES
T.O.C:   Pt expected to d/c to home   Family to provide transport at d/c   Emergency Contact: Beto Little, spouse,cell #  [phone]; work # [phone]: CM met with pt at bedside; verified demographics, insurance, and emergency contact. Pt expected to d/c to home when medically stable with family to provide transport. Prior to admission pt ambulates independently; has adequate support. Pt's Decision Ruth Cueto is her  Beto Little, next of kin. Pt works for [employer], spouse works for [employer]. Parents stated they have a bed for infant and will have a car seat for d/c. Parents will be moving to a new home around time of pt's [facility] 1/20/22. Family had no additional needs for CM at this time. 1115: CM to pt room. Bedside RN beginning NST, having difficulty monitoring baby heartbeat. CM left, will try again later. 0915: CM to pt room for initial intervention. Pt resting, requests no visit at this time. CM will return later. CM noted pt is over her GMLOS. Pt had elevated  BP last evening. OB and MFM in agreement to keep pt inpatient for now. Pt getting weekly BPP and f/u with MFM while inpatient. BID NSTs and close BP management. CM has attempted to meet with pt on multiple occassions, will continue to pursue. CM will follow pt for needs.       Macario Wolfe, RN

## 2021-11-18 NOTE — PROGRESS NOTES
Verbal shift change report given to Sherly Haas RN  (oncoming nurse) by Christie Trevino  (offgoing nurse). Report included the following information SBAR and Kardex. Pt resting comfortably.

## 2021-11-18 NOTE — PROGRESS NOTES
Ante Partum Progress Note    Mary Anne Hu  [id number]    Assessment: 31w0d      1. CHTN with SI PreE w/ SF:  - Labile BPs with increase in requirement for antihypertensives on admission  - BPs now normotensive to mildly hypertensive, currently on Procardia 90 XL daily and Labetalol 200 mg BID  - normal labs on admission except for proteinuria (baseline p/c 0.44, now 0.7)  - appreciate MFM consult  -> will continue inpatient management for now, plan for magnesium if BPs escalate, plan delivery at 34 wks, plan for weekly BPP and f/u with MFM while in house, continue BID NSTs and close BP management     2. Prematurity  -  growth U/S EFW 1506g c/w 66%XGR, cephalic  - s/p BMZ x 2 40/4   - s/p NICU consult  - GBS neg      3.  GDM  - accuchecks QID - good glycemic control  - see diabetic treatment team note from  for recommendations if elevated BGs and insulin management is needed  - cont monthly growth scans     4. Venous Access  - s/p PICC line placement     Orders/Charges: High and Non Stress Test  X 2    Subjective:  Patient reports she's feeling better today. Baby is very active. She denies HA, change in vision RUQ pain. She has no concerns. Vitals:  Visit Vitals  /75 (BP 1 Location: Left upper arm, BP Patient Position: At rest)   Pulse (!) 113   Temp 98.5 °F (36.9 °C)   Resp 16   SpO2 97%     Temp (24hrs), Av.5 °F (36.9 °C), Min:98.1 °F (36.7 °C), Max:98.8 °F (37.1 °C)      Last 24hr Input/Output:  No intake or output data in the 24 hours ending 21 0910     Non stress test:      PM:  150, moderate variability, 10x10 accelerations, no decelerations  [place]: no contractions     AM:  145, moderate variability, positive accelerations, no decelerations  Tocometry: no contractions      Exam:  Patient without distress.      Abdomen, fundus soft non-tender     Extremities, no redness or tenderness               Labs:     Lab Results   Component Value Date/Time    WBC 8.3 11/16/2021 10:52 AM    WBC 8.3 11/07/2021 06:02 AM    WBC 10.9 11/05/2021 11:17 PM    WBC 10.0 11/02/2021 11:39 AM    HGB 11.2 (L) 11/16/2021 10:52 AM    HGB 11.0 (L) 11/07/2021 06:02 AM    HGB 12.1 11/05/2021 11:17 PM    HGB 11.7 11/02/2021 11:39 AM    HCT 34.4 (L) 11/16/2021 10:52 AM    HCT 32.8 (L) 11/07/2021 06:02 AM    HCT 35.9 11/05/2021 11:17 PM    HCT 34.9 (L) 11/02/2021 11:39 AM    PLATELET 321 85/01/6763 10:52 AM    PLATELET 071 67/04/7242 06:02 AM    PLATELET 226 86/67/0341 11:17 PM    PLATELET 556 76/74/9353 11:39 AM       Recent Results (from the past 24 hour(s))   GLUCOSE, POC    Collection Time: 11/17/21 10:36 AM   Result Value Ref Range    Glucose (POC) 119 (H) 65 - 117 mg/dL    [facility] by [facility] [place], POC    Collection Time: 11/17/21  3:05 PM   Result Value Ref Range    Glucose (POC) 119 (H) 65 - 117 mg/dL    Performed by Kimberly Fernandez. (CON)    GLUCOSE, POC    Collection Time: 11/17/21  8:14 PM   Result Value Ref Range    Glucose (POC) 89 65 - 117 mg/dL    Performed by Kimberly Fernandez (CON)    GLUCOSE, POC    Collection Time: 11/18/21  6:23 AM   Result Value Ref Range    Glucose (POC) 94 65 - 117 mg/dL    Performed by Khoi Sosa

## 2021-11-19 LAB
GLUCOSE BLD STRIP.AUTO-MCNC: 107 MG/DL (ref 65–117)
GLUCOSE BLD STRIP.AUTO-MCNC: 89 MG/DL (ref 65–117)
GLUCOSE BLD STRIP.AUTO-MCNC: 91 MG/DL (ref 65–117)
GLUCOSE BLD STRIP.AUTO-MCNC: 95 MG/DL (ref 65–117)
SERVICE CMNT-IMP: NORMAL

## 2021-11-19 PROCEDURE — 74011250637 HC RX REV CODE- 250/637: Performed by: OBSTETRICS & GYNECOLOGY

## 2021-11-19 PROCEDURE — 74011250637 HC RX REV CODE- 250/637: Performed by: ADVANCED PRACTICE MIDWIFE

## 2021-11-19 PROCEDURE — 65410000002 HC RM PRIVATE OB

## 2021-11-19 PROCEDURE — 59025 FETAL NON-STRESS TEST: CPT

## 2021-11-19 PROCEDURE — 82962 GLUCOSE BLOOD TEST: CPT

## 2021-11-19 RX ADMIN — LABETALOL HYDROCHLORIDE 200 MG: 200 TABLET, FILM COATED ORAL at 09:10

## 2021-11-19 RX ADMIN — POTASSIUM BICARBONATE 20 MEQ: 391 TABLET, EFFERVESCENT ORAL at 09:11

## 2021-11-19 RX ADMIN — LABETALOL HYDROCHLORIDE 200 MG: 200 TABLET, FILM COATED ORAL at 20:34

## 2021-11-19 RX ADMIN — NIFEDIPINE 90 MG: 30 TABLET, FILM COATED, EXTENDED RELEASE ORAL at 06:48

## 2021-11-19 RX ADMIN — ASPIRIN 81 MG CHEWABLE TABLET 81 MG: 81 TABLET CHEWABLE at 09:11

## 2021-11-19 RX ADMIN — POTASSIUM BICARBONATE 20 MEQ: 391 TABLET, EFFERVESCENT ORAL at 18:04

## 2021-11-19 NOTE — PROGRESS NOTES
Bedside and Verbal shift change report given to Renetta Reyna RN (oncoming nurse) by Wanda Robb RN (offgoing nurse). Report included the following information SBAR, Kardex, Intake/Output, MAR and Recent Results.

## 2021-11-19 NOTE — PROGRESS NOTES
1545 Verbal shift change report given to Michela Uriarte RN (oncoming nurse) by Daniel Anderson RN (offgoing nurse). Report included the following information SBAR.

## 2021-11-19 NOTE — PROGRESS NOTES
Bedside and Verbal shift change report given to Michael Reese RN (oncoming nurse) by Christiano Lopez RN (offgoing nurse). Report included the following information SBAR, Kardex, ED Summary, OR Summary, Procedure Summary, Intake/Output, MAR, Accordion, Recent Results and Med Rec Status.

## 2021-11-19 NOTE — PROGRESS NOTES
Problem: Falls - Risk of  Goal: *Absence of Falls  Description: Document [place] Fall Risk and appropriate interventions in the flowsheet. Outcome: Progressing Towards Goal  Note: Fall Risk Interventions:            Medication Interventions: Teach patient to arise slowly                   Problem: Pressure Injury - Risk of  Goal: *Prevention of pressure injury  Description: Document Jos Scale and appropriate interventions in the flowsheet. Outcome: Progressing Towards Goal  Note: Pressure Injury Interventions:             Activity Interventions: Increase time out of bed         Nutrition Interventions: Document food/fluid/supplement intake                     Problem: Pain  Goal: *Control of Pain  Outcome: Progressing Towards Goal

## 2021-11-19 NOTE — PROGRESS NOTES
Bedside and Verbal shift change report given to Renetta Reyna RN (oncoming nurse) by Jose Carlos Cooper RN (offgoing nurse). Report included the following information SBAR, Kardex, Intake/Output, MAR and Recent Results.

## 2021-11-19 NOTE — PROGRESS NOTES
Ante Partum Progress Note    Alatna Dines  [id number]    Assessment: 31w1d     1. CHTN with SI PreE w/ SF:  - Labile BPs with increase in requirement for antihypertensives on admission  - BPs now normotensive to mildly hypertensive, currently on Procardia 90 XL daily and Labetalol 200 mg BID  - normal labs on admission except for proteinuria (baseline p/c 0.44, now 0.7)  - labs on   - appreciate MFM consult  -> will continue inpatient management for now, plan for magnesium if BPs escalate, plan delivery at 34 wks, plan for weekly BPP and f/u with MFM while in house, continue BID NSTs and close BP management  *pt interested in bedrest at home, rec discussion w MFM at next visit     2. Prematurity  -  growth U/S EFW 1506g c/w 44%NPM, cephalic  - s/p BMZ x 2 89/4   - s/p NICU consult  - GBS neg      3.  GDM  - accuchecks QID - good glycemic control  - see diabetic treatment team note from  for recommendations if elevated BGs and insulin management is needed  - cont monthly growth scans     4. Venous Access  - s/p PICC line placement      Orders/Charges: High and Non Stress Test  X 2    Patient states she has no new complaints . No HA/visual changes/RUQ pain. Vitals:  Visit Vitals  /83   Pulse 94   Temp 98.2 °F (36.8 °C)   Resp 16   SpO2 97%     Temp (24hrs), Av.3 °F (36.8 °C), Min:98.2 °F (36.8 °C), Max:98.4 °F (36.9 °C)      Last 24hr Input/Output:  No intake or output data in the 24 hours ending 21 1247     Non stress test:  Non-reactive but appropriate for gestational age    pm - 150s, + 10x10 accels, no decels  toco - none    am - 150s, +10x10 accels, no decels  toco - irreg contractions, not felt by patient    Patient Vitals for the past 4 hrs: Mode Fetal Heart Rate Fetal Activity Variability Decelerations Accelerations RN Reviewed Strip? Provider who reviewed strip?  Non Stress Test   21 1049 External 150 Present 6-25 BPM None Yes Yes Dr Marcela Robles Reactive   11/19/21 1020 External              Patient Vitals for the past 4 hrs: Mode Fetal Heart Rate Fetal Activity Variability Decelerations Accelerations RN Reviewed Strip? Provider who reviewed strip? Non Stress Test   11/19/21 1049 External 150 Present 6-25 BPM None Yes Yes Dr Carlee Holm Reactive   11/19/21 1020 External                Uterine Activity: None     Exam:  Patient without distress.      Abdomen, fundus soft non-tender     Extremities, no redness or tenderness               Additional Exam: Deferred    Labs:     Lab Results   Component Value Date/Time    WBC 8.3 11/16/2021 10:52 AM    WBC 8.3 11/07/2021 06:02 AM    WBC 10.9 11/05/2021 11:17 PM    WBC 10.0 11/02/2021 11:39 AM    HGB 11.2 (L) 11/16/2021 10:52 AM    HGB 11.0 (L) 11/07/2021 06:02 AM    HGB 12.1 11/05/2021 11:17 PM    HGB 11.7 11/02/2021 11:39 AM    HCT 34.4 (L) 11/16/2021 10:52 AM    HCT 32.8 (L) 11/07/2021 06:02 AM    HCT 35.9 11/05/2021 11:17 PM    HCT 34.9 (L) 11/02/2021 11:39 AM    PLATELET 818 29/98/3322 10:52 AM    PLATELET 948 53/45/6179 06:02 AM    PLATELET 125 79/90/5599 11:17 PM    PLATELET 642 09/86/7614 11:39 AM       Recent Results (from the past 24 hour(s))   GLUCOSE, POC    Collection Time: 11/18/21  3:03 PM   Result Value Ref Range    Glucose (POC) 95 65 - 117 mg/dL    Performed by Luisito Montilla, POC    Collection Time: 11/18/21  7:38 PM   Result Value Ref Range    Glucose (POC) 107 65 - 117 mg/dL    Performed by Francesco Romo    GLUCOSE, POC    Collection Time: 11/19/21  6:46 AM   Result Value Ref Range    Glucose (POC) 95 65 - 117 mg/dL    Performed by Ami Ware RN    GLUCOSE, POC    Collection Time: 11/19/21 11:53 AM   Result Value Ref Range    Glucose (POC) 107 65 - 117 mg/dL    Performed by Jayla Yousif

## 2021-11-20 LAB
GLUCOSE BLD STRIP.AUTO-MCNC: 101 MG/DL (ref 65–117)
GLUCOSE BLD STRIP.AUTO-MCNC: 119 MG/DL (ref 65–117)
GLUCOSE BLD STRIP.AUTO-MCNC: 92 MG/DL (ref 65–117)
GLUCOSE BLD STRIP.AUTO-MCNC: 93 MG/DL (ref 65–117)
SERVICE CMNT-IMP: ABNORMAL
SERVICE CMNT-IMP: NORMAL

## 2021-11-20 PROCEDURE — 59025 FETAL NON-STRESS TEST: CPT

## 2021-11-20 PROCEDURE — 74011250637 HC RX REV CODE- 250/637: Performed by: OBSTETRICS & GYNECOLOGY

## 2021-11-20 PROCEDURE — 65410000002 HC RM PRIVATE OB

## 2021-11-20 PROCEDURE — 82962 GLUCOSE BLOOD TEST: CPT

## 2021-11-20 PROCEDURE — 74011250637 HC RX REV CODE- 250/637: Performed by: ADVANCED PRACTICE MIDWIFE

## 2021-11-20 RX ORDER — GUAIFENESIN 100 MG/5ML
100 SOLUTION ORAL
Status: DISCONTINUED | OUTPATIENT
Start: 2021-11-20 | End: 2021-11-30 | Stop reason: HOSPADM

## 2021-11-20 RX ADMIN — LABETALOL HYDROCHLORIDE 200 MG: 200 TABLET, FILM COATED ORAL at 20:18

## 2021-11-20 RX ADMIN — LABETALOL HYDROCHLORIDE 200 MG: 200 TABLET, FILM COATED ORAL at 08:46

## 2021-11-20 RX ADMIN — ASPIRIN 81 MG CHEWABLE TABLET 81 MG: 81 TABLET CHEWABLE at 08:46

## 2021-11-20 RX ADMIN — NIFEDIPINE 90 MG: 30 TABLET, FILM COATED, EXTENDED RELEASE ORAL at 06:54

## 2021-11-20 NOTE — PROGRESS NOTES
Ante Partum Progress Note    Deepde Karthik  [id number]    Assessment: 31w2d     1. CHTN with SI PreE w/ SF:  - Labile BPs with increase in requirement for antihypertensives on admission  - BPs now normotensive to mildly hypertensive, currently on Procardia 90 XL daily and Labetalol 200 mg BID  - normal labs on admission except for proteinuria (baseline p/c 0.44, now 0.7)  - labs on   - appreciate MFM consult  -> will continue inpatient management for now, plan for magnesium if BPs escalate, plan delivery at 34 wks, plan for weekly BPP and f/u with MFM while in house, continue BID NSTs and close BP management  *pt interested in bedrest at home, rec discussion w MFM at next visit     2. Prematurity  -  growth U/S EFW 1506g c/w 63%DFE, cephalic  - s/p BMZ x 2    - s/p NICU consult  - GBS neg      3.  GDM  - accuchecks QID - good glycemic control  - see diabetic treatment team note from  for recommendations if elevated BGs and insulin management is needed  - cont monthly growth scans     4. Venous Access  - s/p PICC line placement     5. Congestion - ocean nasal spray and robitussin prn    *pt was getting K replacement, which is now normal, requests to stop, watch K at next lab draw     Orders/Charges: High and Non Stress Test  X 2    Patient states she complains of congestion w some mucous in cough, suspect postnasal drip. Denies HA/visual changes/RUQ pain. Vitals:  Visit Vitals  /85 (BP 1 Location: Left upper arm, BP Patient Position: At rest;Sitting)   Pulse 91   Temp 98.1 °F (36.7 °C)   Resp 18   SpO2 93%     Temp (24hrs), Av.1 °F (36.7 °C), Min:97.5 °F (36.4 °C), Max:98.4 °F (36.9 °C)      Last 24hr Input/Output:    Intake/Output Summary (Last 24 hours) at 2021 0846  Last data filed at 2021 1246  Gross per 24 hour   Intake 850 ml   Output    Net 850 ml        Non stress test:  Reactive  pm - 150s, mod variability, +accels, no decels      No data found. No data found. Uterine Activity: one     Exam:  Patient without distress.      Abdomen, gravid       Additional Exam: Deferred    Labs:     Lab Results   Component Value Date/Time    WBC 8.3 11/16/2021 10:52 AM    WBC 8.3 11/07/2021 06:02 AM    WBC 10.9 11/05/2021 11:17 PM    WBC 10.0 11/02/2021 11:39 AM    HGB 11.2 (L) 11/16/2021 10:52 AM    HGB 11.0 (L) 11/07/2021 06:02 AM    HGB 12.1 11/05/2021 11:17 PM    HGB 11.7 11/02/2021 11:39 AM    HCT 34.4 (L) 11/16/2021 10:52 AM    HCT 32.8 (L) 11/07/2021 06:02 AM    HCT 35.9 11/05/2021 11:17 PM    HCT 34.9 (L) 11/02/2021 11:39 AM    PLATELET 937 33/91/9393 10:52 AM    PLATELET 637 03/99/6978 06:02 AM    PLATELET 618 62/41/9577 11:17 PM    PLATELET 691 07/65/4205 11:39 AM       Recent Results (from the past 24 hour(s))   GLUCOSE, POC    Collection Time: 11/19/21 11:53 AM   Result Value Ref Range    Glucose (POC) 107 65 - 117 mg/dL    Performed by [address] [place] [address], POC    Collection Time: 11/19/21  2:54 PM   Result Value Ref Range    Glucose (POC) 91 65 - 117 mg/dL    Performed by [address] [place] [address], POC    Collection Time: 11/19/21  7:17 PM   Result Value Ref Range    Glucose (POC) 89 65 - 117 mg/dL    Performed by Nazia Erickson Dr, POC    Collection Time: 11/20/21  6:56 AM   Result Value Ref Range    Glucose (POC) 92 65 - 117 mg/dL    Performed by Jessica Oswald

## 2021-11-20 NOTE — PROGRESS NOTES
0736 Verbal shift change report given to Yolanda Castillo RN (oncoming nurse) by Carlos Reddy RN (offgoing nurse). Report included the following information SBAR and Kardex.

## 2021-11-21 LAB
GLUCOSE BLD STRIP.AUTO-MCNC: 102 MG/DL (ref 65–117)
GLUCOSE BLD STRIP.AUTO-MCNC: 106 MG/DL (ref 65–117)
GLUCOSE BLD STRIP.AUTO-MCNC: 85 MG/DL (ref 65–117)
GLUCOSE BLD STRIP.AUTO-MCNC: 94 MG/DL (ref 65–117)
SERVICE CMNT-IMP: NORMAL

## 2021-11-21 PROCEDURE — 82962 GLUCOSE BLOOD TEST: CPT

## 2021-11-21 PROCEDURE — 65410000002 HC RM PRIVATE OB

## 2021-11-21 PROCEDURE — 74011250637 HC RX REV CODE- 250/637: Performed by: ADVANCED PRACTICE MIDWIFE

## 2021-11-21 PROCEDURE — 74011250637 HC RX REV CODE- 250/637: Performed by: OBSTETRICS & GYNECOLOGY

## 2021-11-21 PROCEDURE — 59025 FETAL NON-STRESS TEST: CPT

## 2021-11-21 RX ORDER — SODIUM CHLORIDE 0.9 % (FLUSH) 0.9 %
5-40 SYRINGE (ML) INJECTION EVERY 8 HOURS
Status: DISCONTINUED | OUTPATIENT
Start: 2021-11-21 | End: 2021-11-21

## 2021-11-21 RX ORDER — SODIUM CHLORIDE 0.9 % (FLUSH) 0.9 %
5-40 SYRINGE (ML) INJECTION EVERY 8 HOURS
Status: DISCONTINUED | OUTPATIENT
Start: 2021-11-21 | End: 2021-11-28

## 2021-11-21 RX ADMIN — ASPIRIN 81 MG CHEWABLE TABLET 81 MG: 81 TABLET CHEWABLE at 08:53

## 2021-11-21 RX ADMIN — LABETALOL HYDROCHLORIDE 200 MG: 200 TABLET, FILM COATED ORAL at 08:53

## 2021-11-21 RX ADMIN — NIFEDIPINE 90 MG: 30 TABLET, FILM COATED, EXTENDED RELEASE ORAL at 06:25

## 2021-11-21 RX ADMIN — LABETALOL HYDROCHLORIDE 200 MG: 200 TABLET, FILM COATED ORAL at 20:27

## 2021-11-21 NOTE — PROGRESS NOTES
0745 Verbal shift change report given to Batsheva Louis RN (oncoming nurse) by David Ortiz RN (offgoing nurse). Report included the following information SBAR.

## 2021-11-21 NOTE — PROGRESS NOTES
..Bedside and Verbal shift change report given to Alysha Mccurdy RN (oncoming nurse) by Jamar Paniagua RN (offgoing nurse). Report included the following information SBAR, Kardex, Intake/Output, MAR, Accordion, Recent Results and Med Rec Status.  \

## 2021-11-21 NOTE — PROGRESS NOTES
Ante Partum Progress Note    Ashly Shirley  [id number]    Assessment: 31w3d     1. CHTN with SI PreE w/ SF:  - Labile BPs with increase in requirement for antihypertensives on admission  - BPs now normotensive to mildly hypertensive, currently on Procardia 90 XL daily and Labetalol 200 mg BID  - normal labs on admission except for proteinuria (baseline p/c 0.44, now 0.7)  - labs on   - appreciate MFM consult  -> will continue inpatient management for now, plan for magnesium if BPs escalate, plan delivery at 34 wks, plan for weekly BPP and f/u with MFM while in house, continue BID NSTs and close BP management  *pt interested in bedrest at home, rec discussion w MFM at next visit     2. Prematurity  -  growth U/S EFW 1506g c/w 35%QTX, cephalic  - s/p BMZ x 2    - s/p NICU consult  - GBS neg      3.  GDM  - accuchecks QID - good glycemic control  - see diabetic treatment team note from  for recommendations if elevated BGs and insulin management is needed  - cont monthly growth scans     4. Venous Access  - s/p PICC line placement      5. Congestion - ocean nasal spray and robitussin prn     *pt was getting K replacement, which is now normal, requests to stop, watch K at next lab draw     Orders/Charges: High and Non Stress Test  X 2  Patient states she has no new complaints- no HA/CP/SOB/visual changes or RUQ pain    Vitals:  Visit Vitals  /84   Pulse (!) 110   Temp 98.1 °F (36.7 °C)   Resp 16   SpO2 100%     Temp (24hrs), Av.1 °F (36.7 °C), Min:98.1 °F (36.7 °C), Max:98.2 °F (36.8 °C)      Last 24hr Input/Output:  No intake or output data in the 24 hours ending 21 0829     Non stress test:  Reactive   am - reactive, 150s, +accels, no decels  toco - one ctx     pm - reactive, 150s, +accels, no decels  toco - none    No data found. No data found. Uterine Activity: None     Exam:  Patient without distress.      Abdomen, fundus soft non-tender     Extremities, no redness or tenderness               Additional Exam: Deferred    Labs:     Lab Results   Component Value Date/Time    WBC 8.3 11/16/2021 10:52 AM    WBC 8.3 11/07/2021 06:02 AM    WBC 10.9 11/05/2021 11:17 PM    WBC 10.0 11/02/2021 11:39 AM    HGB 11.2 (L) 11/16/2021 10:52 AM    HGB 11.0 (L) 11/07/2021 06:02 AM    HGB 12.1 11/05/2021 11:17 PM    HGB 11.7 11/02/2021 11:39 AM    HCT 34.4 (L) 11/16/2021 10:52 AM    HCT 32.8 (L) 11/07/2021 06:02 AM    HCT 35.9 11/05/2021 11:17 PM    HCT 34.9 (L) 11/02/2021 11:39 AM    PLATELET 356 36/23/9839 10:52 AM    PLATELET 670 35/49/1279 06:02 AM    PLATELET 986 57/39/9193 11:17 PM    PLATELET 110 48/84/5966 11:39 AM       Recent Results (from the past 24 hour(s))   GLUCOSE, POC    Collection Time: 11/20/21 11:00 AM   Result Value Ref Range    Glucose (POC) 101 65 - 117 mg/dL    Performed by Heidy Bass    GLUCOSE, POC    Collection Time: 11/20/21  2:37 PM   Result Value Ref Range    Glucose (POC) 93 65 - 117 mg/dL    Performed by Heidy Bass    GLUCOSE, POC    Collection Time: 11/20/21  7:19 PM   Result Value Ref Range    Glucose (POC) 119 (H) 65 - 117 mg/dL    Performed by Jose G Piedra, POC    Collection Time: 11/21/21  6:27 AM   Result Value Ref Range    Glucose (POC) 94 65 - 117 mg/dL    Performed by Dallin Gandara

## 2021-11-22 LAB
GLUCOSE BLD STRIP.AUTO-MCNC: 92 MG/DL (ref 65–117)
GLUCOSE BLD STRIP.AUTO-MCNC: 96 MG/DL (ref 65–117)
GLUCOSE BLD STRIP.AUTO-MCNC: 97 MG/DL (ref 65–117)
GLUCOSE BLD STRIP.AUTO-MCNC: 98 MG/DL (ref 65–117)
SERVICE CMNT-IMP: NORMAL

## 2021-11-22 PROCEDURE — 76819 FETAL BIOPHYS PROFIL W/O NST: CPT | Performed by: OBSTETRICS & GYNECOLOGY

## 2021-11-22 PROCEDURE — 74011250637 HC RX REV CODE- 250/637: Performed by: OBSTETRICS & GYNECOLOGY

## 2021-11-22 PROCEDURE — 74011250637 HC RX REV CODE- 250/637: Performed by: ADVANCED PRACTICE MIDWIFE

## 2021-11-22 PROCEDURE — 59025 FETAL NON-STRESS TEST: CPT

## 2021-11-22 PROCEDURE — 82962 GLUCOSE BLOOD TEST: CPT

## 2021-11-22 PROCEDURE — 65410000002 HC RM PRIVATE OB

## 2021-11-22 RX ADMIN — LABETALOL HYDROCHLORIDE 200 MG: 200 TABLET, FILM COATED ORAL at 08:04

## 2021-11-22 RX ADMIN — ASPIRIN 81 MG CHEWABLE TABLET 81 MG: 81 TABLET CHEWABLE at 08:04

## 2021-11-22 RX ADMIN — NIFEDIPINE 90 MG: 30 TABLET, FILM COATED, EXTENDED RELEASE ORAL at 06:29

## 2021-11-22 RX ADMIN — Medication 10 ML: at 14:00

## 2021-11-22 RX ADMIN — Medication 10 ML: at 22:00

## 2021-11-22 RX ADMIN — LABETALOL HYDROCHLORIDE 200 MG: 200 TABLET, FILM COATED ORAL at 20:32

## 2021-11-22 NOTE — PROGRESS NOTES
Ante Partum Progress Note    Alayna Au  [id number]    Assessment: 31w4d     1. CHTN with SI PreE w/ SF:  - Labile BPs with increase in requirement for antihypertensives on admission  - BPs now normotensive to mildly hypertensive, currently on Procardia 90 XL daily and Labetalol 200 mg BID  - normal labs on admission except for proteinuria (baseline p/c 0.44, now 0.7)  - labs on Tuesdays  - appreciate MFM consult 11/8 -> will continue inpatient management for now, plan for magnesium if BPs escalate, plan delivery at 34 wks, plan for weekly BPP and f/u with MFM while in house, continue BID NSTs and close BP management  *pt interested in bedrest at home, rec discussion w MFM at next visit  -BPP 8/8 today, vtx     2. Prematurity  - 11/8 growth U/S EFW 1506g c/w 86%XME, cephalic  - s/p BMZ x 2 23/6   - s/p NICU consult  - GBS neg 11/9     3.  GDM  - accuchecks QID - good glycemic control  - see diabetic treatment team note from 11/8 for recommendations if elevated BGs and insulin management is needed  - cont monthly growth scans     4. Venous Access  - s/p PICC line placement 11/16     5. Congestion - ocean nasal spray and robitussin prn     *pt was getting K replacement, which is now normal, requests to stop, watch K at next lab draw    *Patient expressed desire for outpatient management. I discussed this with Dr. Selin Corona and we are in agreement that with SI preE with SF (despite stable status now) that inpatient management is safest. I reviewed this recommendation with the patient. She is frustrated and disappointed. We reviewed strategies to make her more comfortable here. Will schedule VS checks and time them with med administration. We will make lab draws and BPPs scheduled on Tuesdays as well as she expressed that getting an US today was distressing. She also expressed feeling weak after being in the hospital and not being as active.  I offered a PT consult to help with mobility and strengthening but she declines at this time.     Orders/Charges: High and Non Stress Test  X 2      Patient states she has no new complaints- no HA/CP/SOB/visual changes or RUQ pain. She denies ctx, vb, lof. +FM. Vitals:  Visit Vitals  BP (!) 130/58 (BP 1 Location: Left upper arm, BP Patient Position: At rest)   Pulse 91   Temp 98 °F (36.7 °C)   Resp 16   SpO2 96%     Temp (24hrs), Av.4 °F (36.9 °C), Min:98 °F (36.7 °C), Max:99 °F (37.2 °C)      Last 24hr Input/Output:  No intake or output data in the 24 hours ending 21 1144     Non stress test:  Reactive   pm - reactive, 150s, +accels, no decels  toco - no ctx     am - reactive, 150s, +accels, no decels  toco - 2 contractions    No data found. No data found. Uterine Activity: None     Exam:  Patient without distress.      Abdomen, fundus soft non-tender     Extremities, no redness or tenderness               Additional Exam: Deferred    Labs:     Lab Results   Component Value Date/Time    WBC 8.3 2021 10:52 AM    WBC 8.3 2021 06:02 AM    WBC 10.9 2021 11:17 PM    WBC 10.0 2021 11:39 AM    HGB 11.2 (L) 2021 10:52 AM    HGB 11.0 (L) 2021 06:02 AM    HGB 12.1 2021 11:17 PM    HGB 11.7 2021 11:39 AM    HCT 34.4 (L) 2021 10:52 AM    HCT 32.8 (L) 2021 06:02 AM    HCT 35.9 2021 11:17 PM    HCT 34.9 (L) 2021 11:39 AM    PLATELET 980  10:52 AM    PLATELET 201  06:02 AM    PLATELET 002  11:17 PM    PLATELET 043  11:39 AM       Recent Results (from the past 24 hour(s))   GLUCOSE, POC    Collection Time: 21  3:24 PM   Result Value Ref Range    Glucose (POC) 102 65 - 117 mg/dL    Performed by Jose G Piedra, POC    Collection Time: 21  7:39 PM   Result Value Ref Range    Glucose (POC) 85 65 - 117 mg/dL    Performed by Jose G Piedra, POC    Collection Time: 21  6:32 AM   Result Value Ref Range    Glucose (POC) 98 65 - 117 mg/dL    Performed by Ced Shetty    GLUCOSE, POC    Collection Time: 11/22/21 11:23 AM   Result Value Ref Range    Glucose (POC) 97 65 - 117 mg/dL    Performed by Mary Jo Whitaker

## 2021-11-22 NOTE — PROGRESS NOTES
..Bedside and Verbal shift change report given to Gregorio Monroe RN (oncoming nurse) by Nancyann Cranker, RN (offgoing nurse). Report included the following information SBAR, Intake/Output, MAR, Accordion, Recent Results and Med Rec Status.

## 2021-11-22 NOTE — PROGRESS NOTES
0800: Bedside and Verbal shift change report given to AURA (oncoming nurse) by AL Freedman (offgoing nurse). Report included the following information SBAR, Kardex, Intake/Output, MAR, Accordion and Recent Results.

## 2021-11-23 LAB
ALBUMIN SERPL-MCNC: 3 G/DL (ref 3.5–5)
ALBUMIN/GLOB SERPL: 0.7 {RATIO} (ref 1.1–2.2)
ALP SERPL-CCNC: 128 U/L (ref 45–117)
ALT SERPL-CCNC: 39 U/L (ref 12–78)
ANION GAP SERPL CALC-SCNC: 8 MMOL/L (ref 5–15)
AST SERPL-CCNC: 17 U/L (ref 15–37)
BILIRUB SERPL-MCNC: 0.6 MG/DL (ref 0.2–1)
BUN SERPL-MCNC: 7 MG/DL (ref 6–20)
BUN/CREAT SERPL: 13 (ref 12–20)
CALCIUM SERPL-MCNC: 10.6 MG/DL (ref 8.5–10.1)
CHLORIDE SERPL-SCNC: 104 MMOL/L (ref 97–108)
CO2 SERPL-SCNC: 20 MMOL/L (ref 21–32)
CREAT SERPL-MCNC: 0.56 MG/DL (ref 0.55–1.02)
ERYTHROCYTE [DISTWIDTH] IN BLOOD BY AUTOMATED COUNT: 13.3 % (ref 11.5–14.5)
GLOBULIN SER CALC-MCNC: 4.4 G/DL (ref 2–4)
GLUCOSE BLD STRIP.AUTO-MCNC: 103 MG/DL (ref 65–117)
GLUCOSE BLD STRIP.AUTO-MCNC: 110 MG/DL (ref 65–117)
GLUCOSE BLD STRIP.AUTO-MCNC: 84 MG/DL (ref 65–117)
GLUCOSE BLD STRIP.AUTO-MCNC: 86 MG/DL (ref 65–117)
GLUCOSE SERPL-MCNC: 85 MG/DL (ref 65–100)
HCT VFR BLD AUTO: 34.5 % (ref 35–47)
HGB BLD-MCNC: 11.3 G/DL (ref 11.5–16)
MCH RBC QN AUTO: 29.9 PG (ref 26–34)
MCHC RBC AUTO-ENTMCNC: 32.8 G/DL (ref 30–36.5)
MCV RBC AUTO: 91.3 FL (ref 80–99)
NRBC # BLD: 0 K/UL (ref 0–0.01)
NRBC BLD-RTO: 0 PER 100 WBC
PLATELET # BLD AUTO: 345 K/UL (ref 150–400)
PMV BLD AUTO: 10.3 FL (ref 8.9–12.9)
POTASSIUM SERPL-SCNC: 3.9 MMOL/L (ref 3.5–5.1)
PROT SERPL-MCNC: 7.4 G/DL (ref 6.4–8.2)
RBC # BLD AUTO: 3.78 M/UL (ref 3.8–5.2)
SERVICE CMNT-IMP: NORMAL
SODIUM SERPL-SCNC: 132 MMOL/L (ref 136–145)
WBC # BLD AUTO: 10 K/UL (ref 3.6–11)

## 2021-11-23 PROCEDURE — 74011250637 HC RX REV CODE- 250/637: Performed by: ADVANCED PRACTICE MIDWIFE

## 2021-11-23 PROCEDURE — 82962 GLUCOSE BLOOD TEST: CPT

## 2021-11-23 PROCEDURE — 65410000002 HC RM PRIVATE OB

## 2021-11-23 PROCEDURE — 80053 COMPREHEN METABOLIC PANEL: CPT

## 2021-11-23 PROCEDURE — 85027 COMPLETE CBC AUTOMATED: CPT

## 2021-11-23 PROCEDURE — 36415 COLL VENOUS BLD VENIPUNCTURE: CPT

## 2021-11-23 PROCEDURE — 74011250637 HC RX REV CODE- 250/637: Performed by: OBSTETRICS & GYNECOLOGY

## 2021-11-23 PROCEDURE — 59025 FETAL NON-STRESS TEST: CPT

## 2021-11-23 RX ADMIN — Medication 10 ML: at 15:17

## 2021-11-23 RX ADMIN — ASPIRIN 81 MG CHEWABLE TABLET 81 MG: 81 TABLET CHEWABLE at 08:05

## 2021-11-23 RX ADMIN — NIFEDIPINE 90 MG: 30 TABLET, FILM COATED, EXTENDED RELEASE ORAL at 06:18

## 2021-11-23 RX ADMIN — Medication 10 ML: at 22:00

## 2021-11-23 RX ADMIN — Medication 10 ML: at 06:00

## 2021-11-23 RX ADMIN — LABETALOL HYDROCHLORIDE 200 MG: 200 TABLET, FILM COATED ORAL at 20:29

## 2021-11-23 RX ADMIN — LABETALOL HYDROCHLORIDE 200 MG: 200 TABLET, FILM COATED ORAL at 08:05

## 2021-11-23 RX ADMIN — ACETAMINOPHEN 650 MG: 325 TABLET ORAL at 15:25

## 2021-11-23 NOTE — PROGRESS NOTES
Bedside and Verbal shift change report given to SREEDHAR Rock RN (oncoming nurse) by Steward Lesch. So. RN (offgoing nurse). Report included the following information SBAR, Kardex, Intake/Output, MAR, Recent Results and Med Rec Status.

## 2021-11-23 NOTE — PROGRESS NOTES
Verbal shift change report given to Fermin Metcalf RN  (oncoming nurse) by Ivone Voss. Cindy RN (offgoing nurse). Report included the following information SBAR.

## 2021-11-23 NOTE — PROGRESS NOTES
Ante Partum Progress Note    Yohana Angeles  [id number]    Assessment: 31w5d   1. CHTN with SI PreE w/ SF:  - Labile BPs with increase in requirement for antihypertensives on admission  - BPs now normotensive to mildly hypertensive, currently on Procardia 90 XL daily and Labetalol 200 mg BID  - normal labs on admission except for proteinuria (baseline p/c 0.44, now 0.7)  - labs on Tuesdays (normal today 11/23)  - appreciate MFM consult 11/8 -> will continue inpatient management for now, plan for magnesium if BPs escalate, plan delivery at 34 wks, plan for weekly BPP and f/u with MFM while in house, continue BID NSTs and close BP management  *pt interested in bedrest at home, rec discussion w MFM at next visit  -BPP 8/8 today, vtx     2. Prematurity  - 11/8 growth U/S EFW 1506g c/w 62%KEJ, cephalic  - s/p BMZ x 2 01/2   - s/p NICU consult  - GBS neg 11/9     3.  GDM  - accuchecks QID - good glycemic control  - see diabetic treatment team note from 11/8 for recommendations if elevated BGs and insulin management is needed  - cont monthly growth scans     4. Venous Access  - s/p PICC line placement 11/16     5. Congestion - ocean nasal spray and robitussin prn     *pt was getting K replacement, which is now normal, requests to stop, watch K at next lab draw (normal on 11/23)     *Patient expressed desire for outpatient management. This was discussed this with Dr. Juan Jose Johnson ([facility]) and we are in agreement that with SI preE with SF (despite stable status now) that inpatient management is safest. This was reviewed this recommendation with the patient. She is frustrated and disappointed. Reviewed strategies to make her more comfortable here. Plan VS checks and time them with med administration. Plan lab draws and BPPs scheduled on Tuesdays.   She expressed feeling weak after being in the hospital and not being as active - PT consult offered to help with mobility and strengthening but she declines at this time.     Orders/Charges: High and Non Stress Test  X 2        Patient states she has no new complaints- no HA/CP/SOB/visual changes or RUQ pain. She denies ctx, vb, lof. +FM. Vitals:  Visit Vitals  BP (!) 142/85 (BP 1 Location: Left upper arm, BP Patient Position: At rest)   Pulse 100   Temp 97.9 °F (36.6 °C)   Resp 15   SpO2 97%     Temp (24hrs), Av.9 °F (36.6 °C), Min:97.6 °F (36.4 °C), Max:98.1 °F (36.7 °C)      Last 24hr Input/Output:  No intake or output data in the 24 hours ending 21     Non stress test:     pm:  Baseline 140, moderate variability, + accels, no decels - REACTIVE     am:  Baseline 150, moderate variability, + accels, no decels - REACTIVE    Uterine Activity: None     Exam:  Patient without distress.      Abdomen, fundus soft non-tender     Extremities, no redness or tenderness               Additional Exam: Patient without distress, Abdomen soft, non-tender, Fundus soft and non tender, Right upper quadrant non-tender, Perineum No sign of blood or amniotic fluid, Lower extremities edema No, Patellar Reflexes: 1+ bilaterally and Clonus: absent    Labs:     Lab Results   Component Value Date/Time    WBC 10.0 2021 03:31 AM    WBC 8.3 2021 10:52 AM    WBC 8.3 2021 06:02 AM    WBC 10.9 2021 11:17 PM    WBC 10.0 2021 11:39 AM    HGB 11.3 (L) 2021 03:31 AM    HGB 11.2 (L) 2021 10:52 AM    HGB 11.0 (L) 2021 06:02 AM    HGB 12.1 2021 11:17 PM    HGB 11.7 2021 11:39 AM    HCT 34.5 (L) 2021 03:31 AM    HCT 34.4 (L) 2021 10:52 AM    HCT 32.8 (L) 2021 06:02 AM    HCT 35.9 2021 11:17 PM    HCT 34.9 (L) 2021 11:39 AM    PLATELET 627  03:31 AM    PLATELET 789  10:52 AM    PLATELET 599  06:02 AM    PLATELET 486  11:17 PM    PLATELET 293  11:39 AM       Recent Results (from the past 24 hour(s))   GLUCOSE, POC    Collection Time: 21 11:23 AM Result Value Ref Range    Glucose (POC) 97 65 - 117 mg/dL    Performed by Adele Kwok    GLUCOSE, POC    Collection Time: 11/22/21  4:07 PM   Result Value Ref Range    Glucose (POC) 92 65 - 117 mg/dL    Performed by Reyes Vega    GLUCOSE, POC    Collection Time: 11/22/21  8:34 PM   Result Value Ref Range    Glucose (POC) 96 65 - 117 mg/dL    Performed by Twila Mane    CBC W/O DIFF    Collection Time: 11/23/21  3:31 AM   Result Value Ref Range    WBC 10.0 3.6 - 11.0 K/uL    RBC 3.78 (L) 3.80 - 5.20 M/uL    HGB 11.3 (L) 11.5 - 16.0 g/dL    HCT 34.5 (L) 35.0 - 47.0 %    MCV 91.3 80.0 - 99.0 FL    MCH 29.9 26.0 - 34.0 PG    MCHC 32.8 30.0 - 36.5 g/dL    RDW 13.3 11.5 - 14.5 %    PLATELET 227 800 - 559 K/uL    MPV 10.3 8.9 - 12.9 FL    NRBC 0.0 0  WBC    ABSOLUTE NRBC 0.00 0.00 - 7.23 K/uL   METABOLIC PANEL, COMPREHENSIVE    Collection Time: 11/23/21  3:31 AM   Result Value Ref Range    Sodium 132 (L) 136 - 145 mmol/L    Potassium 3.9 3.5 - 5.1 mmol/L    Chloride 104 97 - 108 mmol/L    CO2 20 (L) 21 - 32 mmol/L    Anion gap 8 5 - 15 mmol/L    Glucose 85 65 - 100 mg/dL    BUN 7 6 - 20 MG/DL    Creatinine 0.56 0.55 - 1.02 MG/DL    BUN/Creatinine ratio 13 12 - 20      GFR est AA >60 >60 ml/min/1.73m2    GFR est non-AA >60 >60 ml/min/1.73m2    Calcium 10.6 (H) 8.5 - 10.1 MG/DL    Bilirubin, total 0.6 0.2 - 1.0 MG/DL    ALT (SGPT) 39 12 - 78 U/L    AST (SGOT) 17 15 - 37 U/L    Alk.  phosphatase 128 (H) 45 - 117 U/L    Protein, total 7.4 6.4 - 8.2 g/dL    Albumin 3.0 (L) 3.5 - 5.0 g/dL    Globulin 4.4 (H) 2.0 - 4.0 g/dL    A-G Ratio 0.7 (L) 1.1 - 2.2     GLUCOSE, POC    Collection Time: 11/23/21  6:17 AM   Result Value Ref Range    Glucose (POC) 84 65 - 117 mg/dL    Performed by Mary Kate Grimaldo

## 2021-11-23 NOTE — PROGRESS NOTES
0800: Bedside and Verbal shift change report given to AURA (oncoming nurse) by RIKKI (offgoing nurse). Report included the following information SBAR, Kardex, Intake/Output, MAR, Accordion and Recent Results.

## 2021-11-24 LAB
GLUCOSE BLD STRIP.AUTO-MCNC: 102 MG/DL (ref 65–117)
GLUCOSE BLD STRIP.AUTO-MCNC: 104 MG/DL (ref 65–117)
GLUCOSE BLD STRIP.AUTO-MCNC: 87 MG/DL (ref 65–117)
GLUCOSE BLD STRIP.AUTO-MCNC: 92 MG/DL (ref 65–117)
SERVICE CMNT-IMP: NORMAL

## 2021-11-24 PROCEDURE — 74011250637 HC RX REV CODE- 250/637: Performed by: ADVANCED PRACTICE MIDWIFE

## 2021-11-24 PROCEDURE — 59025 FETAL NON-STRESS TEST: CPT

## 2021-11-24 PROCEDURE — 65410000002 HC RM PRIVATE OB

## 2021-11-24 PROCEDURE — 74011250637 HC RX REV CODE- 250/637: Performed by: OBSTETRICS & GYNECOLOGY

## 2021-11-24 PROCEDURE — 82962 GLUCOSE BLOOD TEST: CPT

## 2021-11-24 RX ADMIN — NIFEDIPINE 90 MG: 30 TABLET, FILM COATED, EXTENDED RELEASE ORAL at 06:33

## 2021-11-24 RX ADMIN — Medication 10 ML: at 14:00

## 2021-11-24 RX ADMIN — Medication 10 ML: at 06:00

## 2021-11-24 RX ADMIN — Medication 20 ML: at 20:30

## 2021-11-24 RX ADMIN — LABETALOL HYDROCHLORIDE 200 MG: 200 TABLET, FILM COATED ORAL at 20:19

## 2021-11-24 RX ADMIN — ASPIRIN 81 MG CHEWABLE TABLET 81 MG: 81 TABLET CHEWABLE at 08:30

## 2021-11-24 RX ADMIN — LABETALOL HYDROCHLORIDE 200 MG: 200 TABLET, FILM COATED ORAL at 08:30

## 2021-11-24 NOTE — PROGRESS NOTES
1930: Bedside shift change report given to Cleveland Lopez RN (oncoming nurse) by Diego Hills RN and Sagrario Jacob RN (offgoing nurse). Report included the following information SBAR, Kardex, Intake/Output, MAR and Recent Results.

## 2021-11-24 NOTE — PROGRESS NOTES
Ante Partum Progress Note    Mary Anne Hu  [id number]    Assessment: 31w6d   1. CHTN with SI PreE w/ SF:  - Labile BPs with increase in requirement for antihypertensives on admission  - BPs now normotensive to mildly hypertensive, currently on Procardia 90 XL daily and Labetalol 200 mg BID  - normal labs on admission except for proteinuria (baseline p/c 0.44, now 0.7)  - labs on Tuesdays (normal today 11/23)  - appreciate MFM consult 11/8 -> will continue inpatient management for now, plan for magnesium if BPs escalate, plan delivery at 34 wks, plan for weekly BPP and f/u with MFM while in house, continue BID NSTs and close BP management  *pt interested in bedrest at home, rec discussion w MFM at next visit  -BPP 8/8, vtx 11/22    2. Prematurity  - 11/8 growth U/S EFW 1506g c/w 93%RQC, cephalic  - s/p BMZ x 2 69/7   - s/p NICU consult  - GBS neg 11/9     3.  GDM  - accuchecks QID - good glycemic control  - see diabetic treatment team note from 11/8 for recommendations if elevated BGs and insulin management is needed  - cont monthly growth scans     4. Venous Access  - s/p PICC line placement 11/16     5. Congestion - ocean nasal spray and robitussin prn     *pt was getting K replacement, which is now normal, requests to stop, watch K at next lab draw (normal on 11/23)     *11/23 with Dr. Camilla Singh Patient expressed desire for outpatient management. This was discussed this with Dr. Jaylen Ornelas ([facility]) and we are in agreement that with SI preE with SF (despite stable status now) that inpatient management is safest. This was reviewed this recommendation with the patient. She is frustrated and disappointed. Reviewed strategies to make her more comfortable here. Plan VS checks and time them with med administration. Plan lab draws and BPPs scheduled on Tuesdays.   She expressed feeling weak after being in the hospital and not being as active - PT consult offered to help with mobility and strengthening but she declines at this time.     Orders/Charges: High and Non Stress Test  X 2        Patient states she has no new complaints- no HA/CP/SOB/visual changes or RUQ pain. She denies ctx, vb, lof. +FM. Vitals:  Visit Vitals  /88   Pulse 97   Temp 98.5 °F (36.9 °C)   Resp 16   SpO2 95%     Temp (24hrs), Av °F (36.7 °C), Min:97.8 °F (36.6 °C), Max:98.5 °F (36.9 °C)      Last 24hr Input/Output:  No intake or output data in the 24 hours ending 21 0754     Non stress test:      Baseline 150, moderate variability, + accels, no decels - REACTIVE    Uterine Activity: None     Exam:  Patient without distress.      Abdomen, fundus soft non-tender     Extremities, no redness or tenderness               Additional Exam: Patient without distress, Abdomen soft, non-tender, Fundus soft and non tender, Right upper quadrant non-tender, Perineum No sign of blood or amniotic fluid, Lower extremities edema     Labs:     Lab Results   Component Value Date/Time    WBC 10.0 2021 03:31 AM    WBC 8.3 2021 10:52 AM    WBC 8.3 2021 06:02 AM    WBC 10.9 2021 11:17 PM    WBC 10.0 2021 11:39 AM    HGB 11.3 (L) 2021 03:31 AM    HGB 11.2 (L) 2021 10:52 AM    HGB 11.0 (L) 2021 06:02 AM    HGB 12.1 2021 11:17 PM    HGB 11.7 2021 11:39 AM    HCT 34.5 (L) 2021 03:31 AM    HCT 34.4 (L) 2021 10:52 AM    HCT 32.8 (L) 2021 06:02 AM    HCT 35.9 2021 11:17 PM    HCT 34.9 (L) 2021 11:39 AM    PLATELET 804  03:31 AM    PLATELET 181  10:52 AM    PLATELET 530  06:02 AM    PLATELET 368 10/71/9107 11:17 PM    PLATELET 015  11:39 AM       Recent Results (from the past 24 hour(s))   GLUCOSE, POC    Collection Time: 21 12:07 PM   Result Value Ref Range    Glucose (POC) 86 65 - 117 mg/dL    Performed by Sera Erwin, POC    Collection Time: 21  3:15 PM   Result Value Ref Range    Glucose (POC) 103 65 - 117 mg/dL Performed by Bhaskar Garcia RN    GLUCOSE, POC    Collection Time: 11/23/21  8:28 PM   Result Value Ref Range    Glucose (POC) 110 65 - 117 mg/dL    Performed by Bessie Matta    GLUCOSE, POC    Collection Time: 11/24/21  6:35 AM   Result Value Ref Range    Glucose (POC) 87 65 - 117 mg/dL    Performed by Bessie Matta

## 2021-11-25 LAB
GLUCOSE BLD STRIP.AUTO-MCNC: 107 MG/DL (ref 65–117)
GLUCOSE BLD STRIP.AUTO-MCNC: 91 MG/DL (ref 65–117)
GLUCOSE BLD STRIP.AUTO-MCNC: 94 MG/DL (ref 65–117)
SERVICE CMNT-IMP: NORMAL

## 2021-11-25 PROCEDURE — 82962 GLUCOSE BLOOD TEST: CPT

## 2021-11-25 PROCEDURE — 74011250637 HC RX REV CODE- 250/637: Performed by: OBSTETRICS & GYNECOLOGY

## 2021-11-25 PROCEDURE — 59025 FETAL NON-STRESS TEST: CPT

## 2021-11-25 PROCEDURE — 65410000002 HC RM PRIVATE OB

## 2021-11-25 PROCEDURE — 74011250637 HC RX REV CODE- 250/637: Performed by: ADVANCED PRACTICE MIDWIFE

## 2021-11-25 RX ADMIN — LABETALOL HYDROCHLORIDE 200 MG: 200 TABLET, FILM COATED ORAL at 10:11

## 2021-11-25 RX ADMIN — LABETALOL HYDROCHLORIDE 200 MG: 200 TABLET, FILM COATED ORAL at 20:02

## 2021-11-25 RX ADMIN — Medication 10 ML: at 14:00

## 2021-11-25 RX ADMIN — Medication 10 ML: at 22:00

## 2021-11-25 RX ADMIN — ASPIRIN 81 MG CHEWABLE TABLET 81 MG: 81 TABLET CHEWABLE at 10:11

## 2021-11-25 RX ADMIN — NIFEDIPINE 90 MG: 30 TABLET, FILM COATED, EXTENDED RELEASE ORAL at 06:28

## 2021-11-25 NOTE — PROGRESS NOTES
Ante Partum Progress Note    Mary Anne Hu  32w0d    Assessment: 32w0d     1. CHTN with SI PreE w/ SF:  - Labile BPs with increase in requirement for antihypertensives on admission, stable since  - BPs now normotensive to mildly hypertensive, currently on Procardia 90 XL daily and Labetalol 200 mg BID  - normal labs on admission except for proteinuria (baseline p/c 0.44, now 0.7)  - labs on  (normal )  - appreciate MFM consult  -> will continue inpatient management for now, plan for magnesium if BPs escalate, plan delivery at 34 wks, plan for weekly BPP and f/u with MFM while in house, continue BID NSTs and close BP management  - BPP 8/10, vtx   - Pt voiced frustration  about prolonged hospital stay and our/MFM refusal for outpatient management. Reviewed strategies to make her more comfortable here. Plan VS checks and time them with med administration. Plan lab draws and BPPs scheduled on . Consider PT if desires - previously declined by patient.     2. Prematurity  -  growth U/S EFW 1506g c/w 71%UHE, cephalic  - s/p BMZ x 2 70/7   - s/p NICU consult  - GBS neg      3.  GDM  - accuchecks QID - good glycemic control  - see diabetic treatment team note from  for recommendations if elevated BGs and insulin management is needed  - cont monthly growth scans     4. Venous Access  - s/p PICC line placement         Orders/Charges: High and Non Stress Test  X 2        Patient states she has no new complaints- no HA/CP/SOB/visual changes or RUQ pain. She denies ctx, vb, lof. +FM.          Vitals:  Visit Vitals  /71 (BP 1 Location: Left upper arm, BP Patient Position: Sitting)   Pulse (!) 106   Temp 98.1 °F (36.7 °C)   Resp 16   SpO2 97%     Temp (24hrs), Av.5 °F (36.9 °C), Min:98.1 °F (36.7 °C), Max:98.7 °F (37.1 °C)      Last 24hr Input/Output:  No intake or output data in the 24 hours ending 21 1016     Non stress test:  Reactive    An NST was performed and was reactive. The baseline FHR was 150. Moderate baseline  variability was noted. Accelerations of sufficient amplitude and duration were noted. There were no decelerations noted. Uterine Activity: None     Exam:  Patient without distress.      Abdomen, fundus soft non-tender     Extremities, no redness or tenderness               Additional Exam: Deferred    Labs:     Lab Results   Component Value Date/Time    WBC 10.0 11/23/2021 03:31 AM    WBC 8.3 11/16/2021 10:52 AM    WBC 8.3 11/07/2021 06:02 AM    WBC 10.9 11/05/2021 11:17 PM    WBC 10.0 11/02/2021 11:39 AM    HGB 11.3 (L) 11/23/2021 03:31 AM    HGB 11.2 (L) 11/16/2021 10:52 AM    HGB 11.0 (L) 11/07/2021 06:02 AM    HGB 12.1 11/05/2021 11:17 PM    HGB 11.7 11/02/2021 11:39 AM    HCT 34.5 (L) 11/23/2021 03:31 AM    HCT 34.4 (L) 11/16/2021 10:52 AM    HCT 32.8 (L) 11/07/2021 06:02 AM    HCT 35.9 11/05/2021 11:17 PM    HCT 34.9 (L) 11/02/2021 11:39 AM    PLATELET 467 41/07/6638 03:31 AM    PLATELET 802 40/80/3451 10:52 AM    PLATELET 971 24/45/2063 06:02 AM    PLATELET 007 12/12/9212 11:17 PM    PLATELET 030 74/89/5887 11:39 AM       Recent Results (from the past 24 hour(s))   GLUCOSE, POC    Collection Time: 11/24/21  2:46 PM   Result Value Ref Range    Glucose (POC) 92 65 - 117 mg/dL    Performed by [id number] (CON)    GLUCOSE, POC    Collection Time: 11/24/21  9:44 PM   Result Value Ref Range    Glucose (POC) 102 65 - 117 mg/dL    Performed by Iam Pearson, POC    Collection Time: 11/25/21  6:27 AM   Result Value Ref Range    Glucose (POC) 91 65 - 117 mg/dL    Performed by Corky Aguero

## 2021-11-25 NOTE — PROGRESS NOTES
Bedside and Verbal shift change report given to REBEL Hummel RN (oncoming nurse) by TAMERA White RN (offgoing nurse). Report included the following information SBAR and Kardex.

## 2021-11-25 NOTE — PROGRESS NOTES
2125 Verbal shift change report given to Larry Mcgovern RN (oncoming nurse) by Ian Everett RN (offgoing nurse). Report included the following information SBAR and Kardex. 1039 Morning NST completed. Monitor shows 1 small contraction, however, patient denied feeling any contractions. 1830 Pt BS not checked for dinner dose, per \"okay\" from MD during morning rounds. 1925 Pt left unit to visit with family and returned with heavy indigestion and medium bought of emesis. Emesis unmeasured due to pt unable to make it into an emesis bag.

## 2021-11-26 LAB
GLUCOSE BLD STRIP.AUTO-MCNC: 104 MG/DL (ref 65–117)
GLUCOSE BLD STRIP.AUTO-MCNC: 105 MG/DL (ref 65–117)
GLUCOSE BLD STRIP.AUTO-MCNC: 113 MG/DL (ref 65–117)
GLUCOSE BLD STRIP.AUTO-MCNC: 88 MG/DL (ref 65–117)
SERVICE CMNT-IMP: NORMAL

## 2021-11-26 PROCEDURE — 82962 GLUCOSE BLOOD TEST: CPT

## 2021-11-26 PROCEDURE — 74011250637 HC RX REV CODE- 250/637: Performed by: OBSTETRICS & GYNECOLOGY

## 2021-11-26 PROCEDURE — 65410000002 HC RM PRIVATE OB

## 2021-11-26 PROCEDURE — 59025 FETAL NON-STRESS TEST: CPT

## 2021-11-26 PROCEDURE — 74011250637 HC RX REV CODE- 250/637: Performed by: ADVANCED PRACTICE MIDWIFE

## 2021-11-26 RX ADMIN — NIFEDIPINE 90 MG: 30 TABLET, FILM COATED, EXTENDED RELEASE ORAL at 06:31

## 2021-11-26 RX ADMIN — Medication 10 ML: at 17:01

## 2021-11-26 RX ADMIN — LABETALOL HYDROCHLORIDE 200 MG: 200 TABLET, FILM COATED ORAL at 20:05

## 2021-11-26 RX ADMIN — LABETALOL HYDROCHLORIDE 200 MG: 200 TABLET, FILM COATED ORAL at 08:47

## 2021-11-26 RX ADMIN — ASPIRIN 81 MG CHEWABLE TABLET 81 MG: 81 TABLET CHEWABLE at 08:47

## 2021-11-26 NOTE — PROGRESS NOTES
Ante Partum Progress Note    Shannon Hope  32w1d    Assessment: 32w0d     1. CHTN with SI PreE w/ SF:  - Labile BPs with increase in requirement for antihypertensives on admission, stable since  - BPs now normotensive to mildly hypertensive, currently on Procardia 90 XL daily and Labetalol 200 mg BID  - normal labs on admission except for proteinuria (baseline p/c 0.44, now 0.7)  - labs on  (normal )  - appreciate MFM consult  -> will continue inpatient management for now, plan for magnesium if BPs escalate, plan delivery at 34 wks, plan for weekly BPP and f/u with MFM while in house, continue BID NSTs and close BP management  - BPP 8/10, vtx   - Pt voiced frustration  about prolonged hospital stay and our/MFM refusal for outpatient management. Reviewed strategies to make her more comfortable here.  Plan VS checks and time them with med administration. Plan lab draws and BPPs scheduled on . Consider PT if desires - previously declined by patient.     2. Prematurity  -  growth U/S EFW 1506g c/w 46%DJW, cephalic  - s/p BMZ x 2 38/6   - s/p NICU consult  - GBS neg      3.  GDM  - accuchecks QID - good glycemic control  - see diabetic treatment team note from  for recommendations if elevated BGs and insulin management is needed  - cont monthly growth scans     4. Venous Access  - s/p PICC line placement         Orders/Charges: High and Non Stress Test  X 2        Patient states she has no new complaints- no HA/CP/SOB/visual changes or RUQ pain. She denies ctx, vb, lof. +FM.      Vitals:  Visit Vitals  /72   Pulse (!) 105   Temp 98.1 °F (36.7 °C)   Resp 16   SpO2 99%     Temp (24hrs), Av.1 °F (36.7 °C), Min:98 °F (36.7 °C), Max:98.1 °F (36.7 °C)      Last 24hr Input/Output:  No intake or output data in the 24 hours ending 21 0911     Non stress test:  Reactive, Baseline 155s, + accels, no decels, moderate variability, Category 1    No data found.  No data found. Uterine Activity: None     Exam:  Patient without distress.      Abdomen, fundus soft non-tender     Extremities, no redness or tenderness               Additional Exam: Deferred    Labs:     Lab Results   Component Value Date/Time    WBC 10.0 11/23/2021 03:31 AM    WBC 8.3 11/16/2021 10:52 AM    WBC 8.3 11/07/2021 06:02 AM    WBC 10.9 11/05/2021 11:17 PM    WBC 10.0 11/02/2021 11:39 AM    HGB 11.3 (L) 11/23/2021 03:31 AM    HGB 11.2 (L) 11/16/2021 10:52 AM    HGB 11.0 (L) 11/07/2021 06:02 AM    HGB 12.1 11/05/2021 11:17 PM    HGB 11.7 11/02/2021 11:39 AM    HCT 34.5 (L) 11/23/2021 03:31 AM    HCT 34.4 (L) 11/16/2021 10:52 AM    HCT 32.8 (L) 11/07/2021 06:02 AM    HCT 35.9 11/05/2021 11:17 PM    HCT 34.9 (L) 11/02/2021 11:39 AM    PLATELET 734 57/03/2687 03:31 AM    PLATELET 585 12/89/1616 10:52 AM    PLATELET 215 90/37/2595 06:02 AM    PLATELET 826 47/82/5586 11:17 PM    PLATELET 574 83/09/8436 11:39 AM       Recent Results (from the past 24 hour(s))   GLUCOSE, POC    Collection Time: 11/25/21 10:33 AM   Result Value Ref Range    Glucose (POC) 107 65 - 117 mg/dL    Performed by Paola Franklin 112 (CON)    GLUCOSE, POC    Collection Time: 11/25/21  3:03 PM   Result Value Ref Range    Glucose (POC) 94 65 - 117 mg/dL    Performed by Paola Franklin 112 (CON)    GLUCOSE, POC    Collection Time: 11/26/21  6:38 AM   Result Value Ref Range    Glucose (POC) 88 65 - 117 mg/dL    Performed by Lala Washburn Attending Attestation (For Attendings USE Only)...

## 2021-11-26 NOTE — PROGRESS NOTES
1940: Bedside shift change report given to Marilee Chapin RN (oncoming nurse) by Ericka Oakley RN (offgoing nurse). Report included the following information SBAR, Kardex, Intake/Output, MAR and Recent Results.

## 2021-11-26 NOTE — PROGRESS NOTES
High Risk Obstetrics Progress Note    Name: Cyndia Najjar MRN: [id number]  SSN: [id number]    YOB: 1985  Age: 31 y.o. Sex: female      Subjective:      LOS: 16 days    Estimated Date of Delivery: 22   Gestational Age Today: 32w1d     Patient admitted for :    1. CHTN with SI PreE w/ SF:  - Labile BPs with increase in requirement for antihypertensives on admission, stable since  - BPs now normotensive to mildly hypertensive, currently on Procardia 90 XL daily and Labetalol 200 mg BID  - normal labs on admission except for proteinuria (baseline p/c 0.44, now 0.7)  - labs on  (normal )  - appreciate MFM consult  -> will continue inpatient management for now, plan for magnesium if BPs escalate, plan delivery at 34 wks, plan for weekly BPP and f/u with MFM while in house, continue BID NSTs and close BP management  - BPP 8/10, vtx   - Pt voiced frustration  about prolonged hospital stay and our/MFM refusal for outpatient management.  Reviewed strategies to make her more comfortable here.  Plan VS checks and time them with med administration. Plan lab draws and BPPs scheduled on .  Consider PT if desires - previously declined by patient.     2. Prematurity  -  growth U/S EFW 1506g c/w 25%MGD, cephalic  - s/p BMZ x 2 33/   - s/p NICU consult  - GBS neg      3.  GDM  - accuchecks QID - good glycemic control  - see diabetic treatment team note from  for recommendations if elevated BGs and insulin management is needed  - cont monthly growth scans     Objective:     Vitals:  Blood pressure 120/86, pulse (!) 105, temperature 98.1 °F (36.7 °C), resp. rate 16, SpO2 99 %.    Temp (24hrs), Av.1 °F (36.7 °C), Min:98 °F (36.7 °C), Max:98.1 °F (48.6 °C)    Systolic (18CYY), BCX:057 , Min:117 , KDQ:622      Diastolic (99DHD), MCQ:01, Min:72, Max:89       Intake and Output:         Physical Exam:  Deferred       Membranes:  Intact    Uterine Activity: None    Fetal Heart Rate:  Baseline: 130 per minute  Variability: moderate  Accelerations: yes  Decelerations: none  Uterine contractions: none  Uterine irritability: no        Labs:   Recent Results (from the past 36 hour(s))   GLUCOSE, POC    Collection Time: 11/25/21  6:27 AM   Result Value Ref Range    Glucose (POC) 91 65 - 117 mg/dL    Performed by Iam [id number], POC    Collection Time: 11/25/21 10:33 AM   Result Value Ref Range    Glucose (POC) 107 65 - 117 mg/dL    Performed by [facility] (CON)    GLUCOSE, POC    Collection Time: 11/25/21  3:03 PM   Result Value Ref Range    Glucose (POC) 94 65 - 117 mg/dL    Performed by [address] (CON)    GLUCOSE, POC    Collection Time: 11/26/21  6:38 AM   Result Value Ref Range    Glucose (POC) 88 65 - 117 mg/dL    Performed by [address], POC    Collection Time: 11/26/21 10:03 AM   Result Value Ref Range    Glucose (POC) 104 65 - 117 mg/dL    Performed by [facility] (CON)        Assessment and Plan: Active Problems:    29 weeks gestation of pregnancy (11/6/2021)      Chronic hypertension affecting pregnancy (11/6/2021)       1.  CHTN with JIMMY Martinez w/ SF:  - Labile BPs with increase in requirement for antihypertensives on admission, stable since  - BPs now normotensive to mildly hypertensive, currently on Procardia 90 XL daily and Labetalol 200 mg BID  - normal labs on admission except for proteinuria (baseline p/c 0.44, now 0.7)  - labs on Tuesdays (normal 11/30)  - appreciate MFM consult 11/8 -> will continue inpatient management for now, plan for magnesium if BPs escalate, plan delivery at 34 wks, plan for weekly BPP and f/u with MFM while in house, continue BID NSTs and close BP management  - BPP 8/10, vtx 11/22  - Pt voiced frustration 11/22 about prolonged hospital stay and our/MFM refusal for outpatient management.  Reviewed strategies to make her more comfortable here.  Plan VS checks and time them with med administration. Plan lab draws and BPPs scheduled on Tuesdays.  Consider PT if desires - previously declined by patient.     2. Prematurity  - 11/8 growth U/S EFW 1506g c/w 63%LOO, cephalic  - s/p BMZ x 2 35/4   - s/p NICU consult  - GBS neg 11/9     3.  GDM  - accuchecks QID - good glycemic control  - see diabetic treatment team note from 11/8 for recommendations if elevated BGs and insulin management is needed  - cont monthly growth scans     4. Cat 1 NST.     Signed By: Cindi Lucero MD     November 26, 2021

## 2021-11-27 LAB
GLUCOSE BLD STRIP.AUTO-MCNC: 113 MG/DL (ref 65–117)
GLUCOSE BLD STRIP.AUTO-MCNC: 88 MG/DL (ref 65–117)
GLUCOSE BLD STRIP.AUTO-MCNC: 90 MG/DL (ref 65–117)
GLUCOSE BLD STRIP.AUTO-MCNC: 93 MG/DL (ref 65–117)
SERVICE CMNT-IMP: NORMAL

## 2021-11-27 PROCEDURE — 74011250637 HC RX REV CODE- 250/637: Performed by: ADVANCED PRACTICE MIDWIFE

## 2021-11-27 PROCEDURE — 65410000002 HC RM PRIVATE OB

## 2021-11-27 PROCEDURE — 59025 FETAL NON-STRESS TEST: CPT

## 2021-11-27 PROCEDURE — 74011250637 HC RX REV CODE- 250/637: Performed by: OBSTETRICS & GYNECOLOGY

## 2021-11-27 PROCEDURE — 82962 GLUCOSE BLOOD TEST: CPT

## 2021-11-27 RX ADMIN — Medication 10 ML: at 15:42

## 2021-11-27 RX ADMIN — ASPIRIN 81 MG CHEWABLE TABLET 81 MG: 81 TABLET CHEWABLE at 08:01

## 2021-11-27 RX ADMIN — Medication 20 ML: at 05:30

## 2021-11-27 RX ADMIN — LABETALOL HYDROCHLORIDE 200 MG: 200 TABLET, FILM COATED ORAL at 08:01

## 2021-11-27 RX ADMIN — NIFEDIPINE 90 MG: 30 TABLET, FILM COATED, EXTENDED RELEASE ORAL at 05:41

## 2021-11-27 RX ADMIN — LABETALOL HYDROCHLORIDE 200 MG: 200 TABLET, FILM COATED ORAL at 20:12

## 2021-11-27 RX ADMIN — Medication 10 ML: at 22:00

## 2021-11-27 NOTE — PROGRESS NOTES
Ante Partum Progress Note    Doreatha Leak  32w2d    1. CHTN with SI PreE w/ SF:  - Labile BPs with increase in requirement for antihypertensives on admission, stable since  - BPs now normotensive to mildly hypertensive, currently on Procardia 90 XL daily and Labetalol 200 mg BID  - normal labs on admission except for proteinuria (baseline p/c 0.44, now 0.7)  - labs on  (normal )  - appreciate MFM consult  -> will continue inpatient management for now, plan for magnesium if BPs escalate, plan delivery at 34 wks, plan for weekly BPP and f/u with MFM while in house, continue BID NSTs and close BP management  - BPP 8/10, vtx   - Pt voiced frustration  about prolonged hospital stay and our/MFM refusal for outpatient management.  Reviewed strategies to make her more comfortable here.  Plan VS checks and time them with med administration. Plan lab draws and BPPs scheduled on .  Consider PT if desires - previously declined by patient.     2. Prematurity  -  growth U/S EFW 1506g c/w 37%IYN, cephalic  - s/p BMZ x 2    - s/p NICU consult  - GBS neg      3.  GDM  - accuchecks QID - good glycemic control  - see diabetic treatment team note from  for recommendations if elevated BGs and insulin management is needed  - cont monthly growth scans     4. Venous Access  - s/p PICC line placement         Orders/Charges:  Med and Non Stress Test  X 2        Patient states she has no new complaints- no HA/CP/SOB/visual changes or RUQ pain. She denies ctx, vb, lof. +FM.   Some ongoing pressure but no CTX.        Vitals:  Visit Vitals  /74 (BP 1 Location: Left upper arm, BP Patient Position: At rest)   Pulse (!) 107   Temp 98 °F (36.7 °C)   Resp 18   SpO2 99%     Temp (24hrs), Av °F (36.7 °C), Min:97.9 °F (36.6 °C), Max:98.1 °F (36.7 °C)      Last 24hr Input/Output:    Intake/Output Summary (Last 24 hours) at 2021 1110  Last data filed at 2021 1207  Gross per 24 hour   Intake 450 ml   Output    Net 450 ml        Non stress test:  Reactive    Patient Vitals for the past 4 hrs: Mode Fetal Heart Rate Fetal Activity Variability Decelerations Accelerations RN Reviewed Strip? Non Stress Test   11/27/21 0850 External 145 Present 6-25 BPM None Yes Yes Reactive   11/27/21 0800   Present           Patient Vitals for the past 4 hrs: Mode Fetal Heart Rate Fetal Activity Variability Decelerations Accelerations RN Reviewed Strip? Non Stress Test   11/27/21 0850 External 145 Present 6-25 BPM None Yes Yes Reactive   11/27/21 0800   Present             Uterine Activity: None     Exam:  Patient without distress.      Abdomen, fundus soft non-tender     Extremities, no redness or tenderness               Additional Exam: Deferred    Labs:     Lab Results   Component Value Date/Time    WBC 10.0 11/23/2021 03:31 AM    WBC 8.3 11/16/2021 10:52 AM    WBC 8.3 11/07/2021 06:02 AM    WBC 10.9 11/05/2021 11:17 PM    WBC 10.0 11/02/2021 11:39 AM    HGB 11.3 (L) 11/23/2021 03:31 AM    HGB 11.2 (L) 11/16/2021 10:52 AM    HGB 11.0 (L) 11/07/2021 06:02 AM    HGB 12.1 11/05/2021 11:17 PM    HGB 11.7 11/02/2021 11:39 AM    HCT 34.5 (L) 11/23/2021 03:31 AM    HCT 34.4 (L) 11/16/2021 10:52 AM    HCT 32.8 (L) 11/07/2021 06:02 AM    HCT 35.9 11/05/2021 11:17 PM    HCT 34.9 (L) 11/02/2021 11:39 AM    PLATELET 528 39/33/2504 03:31 AM    PLATELET 359 49/55/9661 10:52 AM    PLATELET 379 52/14/6018 06:02 AM    PLATELET 385 82/95/2991 11:17 PM    PLATELET 101 34/28/6520 11:39 AM       Recent Results (from the past 24 hour(s))   GLUCOSE, POC    Collection Time: 11/26/21  5:09 PM   Result Value Ref Range    Glucose (POC) 105 65 - 117 mg/dL    Performed by [address] [place] [address], POC    Collection Time: 11/26/21  9:10 PM   Result Value Ref Range    Glucose (POC) 113 65 - 117 mg/dL    Performed by [address], POC    Collection Time: 11/27/21  5:40 AM   Result Value Ref Range    Glucose (POC) 90 65 - 117 mg/dL    Performed by Rashida Coe

## 2021-11-27 NOTE — PROGRESS NOTES
Bedside and Verbal shift change report given to V. Barrie Severe RN (oncoming nurse) by Bard Nichole. Praveena Becker RN (offgoing nurse). Report included the following information SBAR, Kardex, Intake/Output and MAR.

## 2021-11-27 NOTE — ROUTINE PROCESS
Bedside shift change report given to JOANNE Luong (oncoming nurse) by Juliana Kilpatrick (offgoing nurse). Report included the following information SBAR.

## 2021-11-27 NOTE — PROGRESS NOTES
Bedside and Verbal shift change report given to Izzy Overton RN (oncoming nurse) by Satinder Tay RN (offgoing nurse). Report included the following information SBAR, Kardex, ED Summary, Procedure Summary, Intake/Output, MAR, Accordion and Recent Results.

## 2021-11-27 NOTE — PROGRESS NOTES
Problem: Falls - Risk of  Goal: *Absence of Falls  Description: Document True Sol Fall Risk and appropriate interventions in the flowsheet. Outcome: Progressing Towards Goal  Note: Fall Risk Interventions:            Medication Interventions: Teach patient to arise slowly                   Problem: Pressure Injury - Risk of  Goal: *Prevention of pressure injury  Description: Document Jos Scale and appropriate interventions in the flowsheet. Outcome: Progressing Towards Goal  Note: Pressure Injury Interventions:             Activity Interventions: Increase time out of bed         Nutrition Interventions: Document food/fluid/supplement intake                     Problem: Pain  Goal: *Control of Pain  Outcome: Progressing Towards Goal

## 2021-11-27 NOTE — PROGRESS NOTES
0730: Bedside shift change report given to Mortimer Mustard, RN (oncoming nurse) by Nona Gilliam RN (offgoing nurse). Report included the following information SBAR, Kardex, Intake/Output, MAR and Recent Results.

## 2021-11-27 NOTE — PROGRESS NOTES
Bedside shift change report given to MILLIE Thomas (oncoming nurse) by Imani Milligan (offgoing nurse). Report included the following information SBAR.

## 2021-11-28 LAB
GLUCOSE BLD STRIP.AUTO-MCNC: 100 MG/DL (ref 65–117)
GLUCOSE BLD STRIP.AUTO-MCNC: 105 MG/DL (ref 65–117)
GLUCOSE BLD STRIP.AUTO-MCNC: 85 MG/DL (ref 65–117)
GLUCOSE BLD STRIP.AUTO-MCNC: 94 MG/DL (ref 65–117)
SERVICE CMNT-IMP: NORMAL

## 2021-11-28 PROCEDURE — 82962 GLUCOSE BLOOD TEST: CPT

## 2021-11-28 PROCEDURE — 74011250637 HC RX REV CODE- 250/637: Performed by: OBSTETRICS & GYNECOLOGY

## 2021-11-28 PROCEDURE — 65410000002 HC RM PRIVATE OB

## 2021-11-28 PROCEDURE — 74011250637 HC RX REV CODE- 250/637: Performed by: ADVANCED PRACTICE MIDWIFE

## 2021-11-28 PROCEDURE — 59025 FETAL NON-STRESS TEST: CPT

## 2021-11-28 RX ORDER — GUAIFENESIN 600 MG/1
600 TABLET, EXTENDED RELEASE ORAL EVERY 12 HOURS
Status: DISCONTINUED | OUTPATIENT
Start: 2021-11-28 | End: 2021-11-30 | Stop reason: HOSPADM

## 2021-11-28 RX ORDER — SODIUM CHLORIDE 0.9 % (FLUSH) 0.9 %
5-40 SYRINGE (ML) INJECTION 2 TIMES DAILY
Status: DISCONTINUED | OUTPATIENT
Start: 2021-11-28 | End: 2021-11-30 | Stop reason: HOSPADM

## 2021-11-28 RX ADMIN — NIFEDIPINE 90 MG: 30 TABLET, FILM COATED, EXTENDED RELEASE ORAL at 05:54

## 2021-11-28 RX ADMIN — ASPIRIN 81 MG CHEWABLE TABLET 81 MG: 81 TABLET CHEWABLE at 08:53

## 2021-11-28 RX ADMIN — LABETALOL HYDROCHLORIDE 200 MG: 200 TABLET, FILM COATED ORAL at 20:13

## 2021-11-28 RX ADMIN — LABETALOL HYDROCHLORIDE 200 MG: 200 TABLET, FILM COATED ORAL at 08:53

## 2021-11-28 RX ADMIN — Medication 10 ML: at 06:00

## 2021-11-28 RX ADMIN — GUAIFENESIN 600 MG: 600 TABLET, EXTENDED RELEASE ORAL at 20:14

## 2021-11-28 RX ADMIN — GUAIFENESIN 600 MG: 600 TABLET, EXTENDED RELEASE ORAL at 10:32

## 2021-11-28 RX ADMIN — Medication 10 ML: at 17:00

## 2021-11-28 NOTE — PROGRESS NOTES
Problem: Falls - Risk of  Goal: *Absence of Falls  Description: Document Isaias Orosco Fall Risk and appropriate interventions in the flowsheet. Outcome: Progressing Towards Goal  Note: Fall Risk Interventions:            Medication Interventions: Teach patient to arise slowly                   Problem: Pressure Injury - Risk of  Goal: *Prevention of pressure injury  Description: Document Jos Scale and appropriate interventions in the flowsheet. Outcome: Progressing Towards Goal  Note: Pressure Injury Interventions:             Activity Interventions: Increase time out of bed         Nutrition Interventions: Document food/fluid/supplement intake                     Problem: Pain  Goal: *Control of Pain  Outcome: Progressing Towards Goal     Problem: Hypertensive Disorders of Pregnancy Care Plan (Gestational HTN, Preeclampsia, HELLP syndrome, Eclampsia, Chronic HTN, Superimposed Preeclamsia)  Goal: *Blood pressure within specified parameters  Outcome: Progressing Towards Goal  Goal: *Reassuring fetal surveillance  Outcome: Progressing Towards Goal

## 2021-11-28 NOTE — PROGRESS NOTES
Bedside and Verbal shift change report given to Mark Gallagher RN (oncoming nurse) by Leisa Meeks RN (offgoing nurse). Report included the following information SBAR, Kardex, ED Summary, Intake/Output, MAR, Accordion and Recent Results.

## 2021-11-28 NOTE — PROGRESS NOTES
5260 Verbal shift change report given to Felix Torres RN (oncoming nurse) by Opal Obando RN (offgoing nurse). Report included the following information SBAR and Kardex.

## 2021-11-28 NOTE — PROGRESS NOTES
Ante Partum Progress Note    Yohana Angeles  32w3d    Assessment: 32w3d     1. CHTN with SI PreE w/ SF:  - Labile BPs with increase in requirement for antihypertensives on admission, stable since  - BPs now normotensive to mildly hypertensive, currently on Procardia 90 XL daily and Labetalol 200 mg BID  - normal labs on admission except for proteinuria (baseline p/c 0.44, now 0.7)  - labs on  (normal )  - appreciate MFM consult  -> will continue inpatient management for now, plan for magnesium if BPs escalate, plan delivery at 34 wks, plan for weekly BPP and f/u with MFM while in house, continue BID NSTs and close BP management  - BPP 8/10, vtx   - Pt voiced frustration  about prolonged hospital stay and our/MFM refusal for outpatient management.  Reviewed strategies to make her more comfortable here.  Plan VS checks and time them with med administration. Plan lab draws and BPPs scheduled on .  Consider PT if desires - previously declined by patient.     2. Prematurity  -  growth U/S EFW 1506g c/w 29%EED, cephalic  - s/p BMZ x 2 66/1   - s/p NICU consult  - GBS neg      3.  GDM  - accuchecks QID - good glycemic control  - see diabetic treatment team note from  for recommendations if elevated BGs and insulin management is needed  - cont monthly growth scans     4. Venous Access  - s/p PICC line placement         Orders/Charges:  Med and Non Stress Test  X 2        Patient states she has no new complaints- no HA/CP/SOB/visual changes or RUQ pain. She denies ctx, vb, lof. +FM.   Some ongoing pressure but no CTX.      Vitals:  Visit Vitals  /88 (BP 1 Location: Left upper arm, BP Patient Position: At rest;Sitting)   Pulse 100   Temp 98.1 °F (36.7 °C)   Resp 14   SpO2 98%     Temp (24hrs), Av.1 °F (36.7 °C), Min:97.9 °F (36.6 °C), Max:98.3 °F (36.8 °C)      Last 24hr Input/Output:  No intake or output data in the 24 hours ending 21 1008     Non stress test:  Reactive  An NST was performed and was reactive. The baseline FHR was 145. Moderate baseline  variability was noted. Accelerations of sufficient amplitude and duration were noted. There were no decelerations noted. Uterine Activity: None     Exam:  Patient without distress.      Abdomen, fundus soft non-tender     Extremities, no redness or tenderness               Additional Exam: Deferred    Labs:     Lab Results   Component Value Date/Time    WBC 10.0 11/23/2021 03:31 AM    WBC 8.3 11/16/2021 10:52 AM    WBC 8.3 11/07/2021 06:02 AM    WBC 10.9 11/05/2021 11:17 PM    WBC 10.0 11/02/2021 11:39 AM    HGB 11.3 (L) 11/23/2021 03:31 AM    HGB 11.2 (L) 11/16/2021 10:52 AM    HGB 11.0 (L) 11/07/2021 06:02 AM    HGB 12.1 11/05/2021 11:17 PM    HGB 11.7 11/02/2021 11:39 AM    HCT 34.5 (L) 11/23/2021 03:31 AM    HCT 34.4 (L) 11/16/2021 10:52 AM    HCT 32.8 (L) 11/07/2021 06:02 AM    HCT 35.9 11/05/2021 11:17 PM    HCT 34.9 (L) 11/02/2021 11:39 AM    PLATELET 133 90/77/5203 03:31 AM    PLATELET 225 06/88/1653 10:52 AM    PLATELET 284 85/88/1803 06:02 AM    PLATELET 552 71/84/0026 11:17 PM    PLATELET 860 96/35/5074 11:39 AM       Recent Results (from the past 24 hour(s))   GLUCOSE, POC    Collection Time: 11/27/21 11:59 AM   Result Value Ref Range    Glucose (POC) 88 65 - 117 mg/dL    Performed by Kayden Delgado    GLUCOSE, POC    Collection Time: 11/27/21  6:51 PM   Result Value Ref Range    Glucose (POC) 113 65 - 117 mg/dL    Performed by Naveen Lopez    GLUCOSE, POC    Collection Time: 11/27/21  9:23 PM   Result Value Ref Range    Glucose (POC) 93 65 - 117 mg/dL    Performed by Shayy Freedman RN    GLUCOSE, POC    Collection Time: 11/28/21  5:53 AM   Result Value Ref Range    Glucose (POC) 85 65 - 117 mg/dL    Performed by [employer] Model RN

## 2021-11-29 LAB
GLUCOSE BLD STRIP.AUTO-MCNC: 112 MG/DL (ref 65–117)
GLUCOSE BLD STRIP.AUTO-MCNC: 115 MG/DL (ref 65–117)
GLUCOSE BLD STRIP.AUTO-MCNC: 116 MG/DL (ref 65–117)
GLUCOSE BLD STRIP.AUTO-MCNC: 80 MG/DL (ref 65–117)
SERVICE CMNT-IMP: NORMAL

## 2021-11-29 PROCEDURE — 74011250637 HC RX REV CODE- 250/637: Performed by: OBSTETRICS & GYNECOLOGY

## 2021-11-29 PROCEDURE — 59025 FETAL NON-STRESS TEST: CPT

## 2021-11-29 PROCEDURE — 82962 GLUCOSE BLOOD TEST: CPT

## 2021-11-29 PROCEDURE — 74011250637 HC RX REV CODE- 250/637: Performed by: ADVANCED PRACTICE MIDWIFE

## 2021-11-29 PROCEDURE — 65410000002 HC RM PRIVATE OB

## 2021-11-29 RX ADMIN — Medication 10 ML: at 09:00

## 2021-11-29 RX ADMIN — LABETALOL HYDROCHLORIDE 200 MG: 200 TABLET, FILM COATED ORAL at 08:13

## 2021-11-29 RX ADMIN — LABETALOL HYDROCHLORIDE 200 MG: 200 TABLET, FILM COATED ORAL at 20:28

## 2021-11-29 RX ADMIN — GUAIFENESIN 600 MG: 600 TABLET, EXTENDED RELEASE ORAL at 08:13

## 2021-11-29 RX ADMIN — Medication 20 ML: at 17:06

## 2021-11-29 RX ADMIN — GUAIFENESIN 600 MG: 600 TABLET, EXTENDED RELEASE ORAL at 20:28

## 2021-11-29 RX ADMIN — ASPIRIN 81 MG CHEWABLE TABLET 81 MG: 81 TABLET CHEWABLE at 08:13

## 2021-11-29 RX ADMIN — NIFEDIPINE 90 MG: 30 TABLET, FILM COATED, EXTENDED RELEASE ORAL at 06:33

## 2021-11-29 NOTE — PROGRESS NOTES
Ante Partum Progress Note    Danielle Paiz  55Y1W       Assessment: 32w4d      1. CHTN with SI PreE w/ SF:  - Labile BPs with increase in requirement for antihypertensives on admission, stable since  - BPs now normotensive to mildly hypertensive, currently on Procardia 90 XL daily and Labetalol 200 mg BID  - normal labs on admission except for proteinuria (baseline p/c 0.44, now 0.7)  - labs on Tuesdays (normal 11/30)  - appreciate MFM consult 11/8 -> will continue inpatient management for now, plan for magnesium if BPs escalate, plan delivery at 34 wks, plan for weekly BPP and f/u with MFM while in house, continue BID NSTs and close BP management. Pt had questions about delivery at 34 weeks -- reviewed this is endpoint with pre-eclampsia w/SF and that continued expectant mgmt often increases risk of maternal morbidity without significant fetal benefit. Recommend she discuss further with MFM.   - BPP 8/10, vtx 11/22  - Pt voiced frustration 11/22 about prolonged hospital stay and our/MFM refusal for outpatient management.  Reviewed strategies to make her more comfortable here.  Plan VS checks and time them with med administration. Plan lab draws and BPPs scheduled on Tuesdays.  Consider PT if desires - previously declined by patient.     2. Prematurity  - 11/8 growth U/S EFW 1506g c/w 81%NAX, cephalic  - s/p BMZ x 2 36/2   - s/p NICU consult  - GBS neg 11/9     3.  GDM  - accuchecks QID - good glycemic control  - see diabetic treatment team note from 11/8 for recommendations if elevated BGs and insulin management is needed  - cont monthly growth scans     4. Venous Access  - s/p PICC line placement 11/16        Orders/Charges:  Med and Non Stress Test  X 2        Patient states she has no new complaints- no HA/CP/SOB/visual changes or RUQ pain. She denies ctx, vb, lof. +FM.   Some ongoing pressure but no CTX.     Vitals:  Visit Vitals  /83 (BP 1 Location: Left upper arm, BP Patient Position: At rest) Pulse 93   Temp 98.2 °F (36.8 °C)   Resp 18   SpO2 98%     Temp (24hrs), Av.1 °F (36.7 °C), Min:97.7 °F (36.5 °C), Max:98.5 °F (36.9 °C)      Last 24hr Input/Output:  No intake or output data in the 24 hours ending 21 1124     Non stress test:  Reactive    Patient Vitals for the past 4 hrs: Mode Fetal Heart Rate Fetal Activity Variability Decelerations Accelerations RN Reviewed Strip? Non Stress Test   21 1031 External 150 Present 6-25 BPM None Yes Yes Reactive   21 0811   Present           Patient Vitals for the past 4 hrs: Mode Fetal Heart Rate Fetal Activity Variability Decelerations Accelerations RN Reviewed Strip? Non Stress Test   21 1031 External 150 Present 6-25 BPM None Yes Yes Reactive   21 0811   Present             Uterine Activity: None     Exam:  Patient without distress.      Abdomen, fundus soft non-tender     Extremities, no redness or tenderness               Additional Exam: Deferred    Labs:     Lab Results   Component Value Date/Time    WBC 10.0 2021 03:31 AM    WBC 8.3 2021 10:52 AM    WBC 8.3 2021 06:02 AM    WBC 10.9 2021 11:17 PM    WBC 10.0 2021 11:39 AM    HGB 11.3 (L) 2021 03:31 AM    HGB 11.2 (L) 2021 10:52 AM    HGB 11.0 (L) 2021 06:02 AM    HGB 12.1 2021 11:17 PM    HGB 11.7 2021 11:39 AM    HCT 34.5 (L) 2021 03:31 AM    HCT 34.4 (L) 2021 10:52 AM    HCT 32.8 (L) 2021 06:02 AM    HCT 35.9 2021 11:17 PM    HCT 34.9 (L) 2021 11:39 AM    PLATELET 215  03:31 AM    PLATELET 378  10:52 AM    PLATELET 030  06:02 AM    PLATELET 905  11:17 PM    PLATELET 104  11:39 AM       Recent Results (from the past 24 hour(s))   GLUCOSE, POC    Collection Time: 21 11:49 AM   Result Value Ref Range    Glucose (POC) 105 65 - 117 mg/dL    Performed by Nazia Erickson Dr, POC    Collection Time: 21 2:40 PM   Result Value Ref Range    Glucose (POC) 100 65 - 117 mg/dL    Performed by [address], POC    Collection Time: 11/28/21  7:54 PM   Result Value Ref Range    Glucose (POC) 94 65 - 117 mg/dL    Performed by [address], POC    Collection Time: 11/29/21  6:36 AM   Result Value Ref Range    Glucose (POC) 80 65 - 117 mg/dL    Performed by [address], POC    Collection Time: 11/29/21 11:03 AM   Result Value Ref Range    Glucose (POC) 116 65 - 117 mg/dL    Performed by Nam Grossman (CON)

## 2021-11-29 NOTE — PROGRESS NOTES
4369: Bedside and Verbal shift change report given to AURA (oncoming nurse) by REBEL GAINES (offgoing nurse). Report included the following information SBAR, Kardex, Intake/Output, MAR and Accordion.

## 2021-11-29 NOTE — PROGRESS NOTES
1535- Bedside and Verbal shift change report given to GENNA Way RN (oncoming nurse) by Sridhar White RN (offgoing nurse). Report included the following information SBAR, MAR and Recent Results.

## 2021-11-30 VITALS
OXYGEN SATURATION: 98 % | SYSTOLIC BLOOD PRESSURE: 113 MMHG | HEART RATE: 100 BPM | RESPIRATION RATE: 16 BRPM | DIASTOLIC BLOOD PRESSURE: 70 MMHG | TEMPERATURE: 98.2 F

## 2021-11-30 PROBLEM — O24.419 GESTATIONAL DIABETES: Status: ACTIVE | Noted: 2021-11-30

## 2021-11-30 PROBLEM — O11.9 CHRONIC HYPERTENSION WITH SUPERIMPOSED PRE-ECLAMPSIA: Status: ACTIVE | Noted: 2021-11-30

## 2021-11-30 LAB
ALBUMIN SERPL-MCNC: 2.7 G/DL (ref 3.5–5)
ALBUMIN/GLOB SERPL: 0.7 {RATIO} (ref 1.1–2.2)
ALP SERPL-CCNC: 124 U/L (ref 45–117)
ALT SERPL-CCNC: 30 U/L (ref 12–78)
ANION GAP SERPL CALC-SCNC: 11 MMOL/L (ref 5–15)
AST SERPL-CCNC: 13 U/L (ref 15–37)
BILIRUB SERPL-MCNC: 0.4 MG/DL (ref 0.2–1)
BUN SERPL-MCNC: 5 MG/DL (ref 6–20)
BUN/CREAT SERPL: 10 (ref 12–20)
CALCIUM SERPL-MCNC: 10.1 MG/DL (ref 8.5–10.1)
CHLORIDE SERPL-SCNC: 106 MMOL/L (ref 97–108)
CO2 SERPL-SCNC: 19 MMOL/L (ref 21–32)
CREAT SERPL-MCNC: 0.48 MG/DL (ref 0.55–1.02)
ERYTHROCYTE [DISTWIDTH] IN BLOOD BY AUTOMATED COUNT: 13.6 % (ref 11.5–14.5)
GLOBULIN SER CALC-MCNC: 4 G/DL (ref 2–4)
GLUCOSE BLD STRIP.AUTO-MCNC: 102 MG/DL (ref 65–117)
GLUCOSE BLD STRIP.AUTO-MCNC: 118 MG/DL (ref 65–117)
GLUCOSE SERPL-MCNC: 93 MG/DL (ref 65–100)
HCT VFR BLD AUTO: 32.2 % (ref 35–47)
HGB BLD-MCNC: 10.8 G/DL (ref 11.5–16)
MCH RBC QN AUTO: 30.3 PG (ref 26–34)
MCHC RBC AUTO-ENTMCNC: 33.5 G/DL (ref 30–36.5)
MCV RBC AUTO: 90.2 FL (ref 80–99)
NRBC # BLD: 0 K/UL (ref 0–0.01)
NRBC BLD-RTO: 0 PER 100 WBC
PLATELET # BLD AUTO: 356 K/UL (ref 150–400)
PMV BLD AUTO: 10.5 FL (ref 8.9–12.9)
POTASSIUM SERPL-SCNC: 3.5 MMOL/L (ref 3.5–5.1)
PROT SERPL-MCNC: 6.7 G/DL (ref 6.4–8.2)
RBC # BLD AUTO: 3.57 M/UL (ref 3.8–5.2)
SERVICE CMNT-IMP: ABNORMAL
SERVICE CMNT-IMP: NORMAL
SODIUM SERPL-SCNC: 136 MMOL/L (ref 136–145)
WBC # BLD AUTO: 9.8 K/UL (ref 3.6–11)

## 2021-11-30 PROCEDURE — 76819 FETAL BIOPHYS PROFIL W/O NST: CPT | Performed by: OBSTETRICS & GYNECOLOGY

## 2021-11-30 PROCEDURE — 74011250637 HC RX REV CODE- 250/637: Performed by: ADVANCED PRACTICE MIDWIFE

## 2021-11-30 PROCEDURE — 36415 COLL VENOUS BLD VENIPUNCTURE: CPT

## 2021-11-30 PROCEDURE — 76816 OB US FOLLOW-UP PER FETUS: CPT | Performed by: OBSTETRICS & GYNECOLOGY

## 2021-11-30 PROCEDURE — 82962 GLUCOSE BLOOD TEST: CPT

## 2021-11-30 PROCEDURE — 80053 COMPREHEN METABOLIC PANEL: CPT

## 2021-11-30 PROCEDURE — 02PYX3Z REMOVAL OF INFUSION DEVICE FROM GREAT VESSEL, EXTERNAL APPROACH: ICD-10-PCS | Performed by: OBSTETRICS & GYNECOLOGY

## 2021-11-30 PROCEDURE — 85027 COMPLETE CBC AUTOMATED: CPT

## 2021-11-30 PROCEDURE — 74011250637 HC RX REV CODE- 250/637: Performed by: OBSTETRICS & GYNECOLOGY

## 2021-11-30 RX ORDER — NIFEDIPINE 90 MG/1
90 TABLET, EXTENDED RELEASE ORAL DAILY
Qty: 30 TABLET | Refills: 2 | Status: SHIPPED
Start: 2021-12-01 | End: 2022-01-03

## 2021-11-30 RX ORDER — SENNOSIDES/DOCUSATE SODIUM 8.6MG-50MG
TABLET ORAL
Qty: 100 STRIP | Refills: 1 | Status: SHIPPED
Start: 2021-11-30 | End: 2022-01-03

## 2021-11-30 RX ORDER — BACITRACIN 500 UNIT/G
PACKET (EA) TOPICAL
Status: DISCONTINUED
Start: 2021-11-30 | End: 2021-11-30 | Stop reason: HOSPADM

## 2021-11-30 RX ORDER — LABETALOL 200 MG/1
200 TABLET, FILM COATED ORAL EVERY 12 HOURS
Qty: 60 TABLET | Refills: 2 | Status: SHIPPED
Start: 2021-11-30 | End: 2022-01-03

## 2021-11-30 RX ORDER — INSULIN PUMP SYRINGE, 3 ML
EACH MISCELLANEOUS
Qty: 1 KIT | Refills: 0 | Status: SHIPPED
Start: 2021-11-30 | End: 2022-01-03

## 2021-11-30 RX ADMIN — LABETALOL HYDROCHLORIDE 200 MG: 200 TABLET, FILM COATED ORAL at 11:19

## 2021-11-30 RX ADMIN — NIFEDIPINE 90 MG: 30 TABLET, FILM COATED, EXTENDED RELEASE ORAL at 06:04

## 2021-11-30 RX ADMIN — GUAIFENESIN 600 MG: 600 TABLET, EXTENDED RELEASE ORAL at 11:19

## 2021-11-30 RX ADMIN — ASPIRIN 81 MG CHEWABLE TABLET 81 MG: 81 TABLET CHEWABLE at 11:19

## 2021-11-30 NOTE — PROGRESS NOTES
Indication: Severe Pre-Mnoqmcxyr4ak Tri O14.13.   ____________________________________________________________________________  History: Age: 31 years. : 1 Para: 0.   Current Pregnancy: Blood group: O Rhesus D-positive. Pre- pregnancy data: Weight 165 lbs. Height 5 ft 1 ins. BMI 31.2.  ____________________________________________________________________________  Dating:  Stated EDC:  EDC: 22 GA by stated EDC: 32w5d  Current Scan on: 21 EDC: 22 GA by current scan: 31w6d  Best Overall Assessment: 21 EDC: 22 Assessed GA: 32w5d  The calculation of the gestational age by current scan was based on BPD, OFD, HC, AC, FL and HUM. The Best Overall Assessment is based on the stated EDC.  ____________________________________________________________________________  General Evaluation:  Fetal heart activity: present. Fetal heart rate: 145 bpm.   Presentation: cephalic. Fetal movement: visible. Amniotic Fluid: polyhydramnios. RITA  29.8 cm. Maximal vertical pocket 11.4 cm. Cord: 3 vessels. Fetal cord insertion site: Normal.   Placenta: anterior. ____________________________________________________________________________  Anatomy Scan:  Garcia gestation. Biometry:  BPD 83.3 mm 67th% 33w4d (32w3d to 34w4d)  .1 mm 9th% 32w2d (29w3d to 35w2d)  .9 mm 85th% 34w0d (33w0d to 35w0d)  FL 58.0 mm <5th% 30w2d (27w3d to 33w2d)  .7 mm 16th% 30w6d  HUM 51.5 mm 8th% 30w2d  EFW (lbs/oz) 4 lbs 8 ozs  EFW (g) 2049 g  47th%       Fetal Anatomy:  Visualized with normal appearance: head, brain, spine, abdominal wall, gastrointestinal tract, kidneys, bladder. Heart: The 4-chamber view was obtained but outflow tracts could not be adequately visualized. Summary of Ultrasound Findings:  Transabdominal US. U/S machine: [employer] E8 Expert. Impression:  The fetal anatomy survey appears normal and fetal growth is appropriate.    ____________________________________________________________________________  Fetal Wellbeing Assessment:  Amniotic fluid: polyhydramnios. RITA: 29.8 cm. MVP: 11.4 cm. Q1: 11.4 cm. Q2: 6.7 cm. Q3: 5.2 cm. Q4: 6.5 cm. Biophysical Profile: Fetal body movements: normal (2), Fetal tone: normal (2), Fetal breathing movements: normal (2), Amniotic fluid volume: normal (2). Score 8 / 8. Summary: Reassuring fetal status. ____________________________________________________________________________  Maternal Structures:  Uterus: Fibroids: Fibroid 1: Size: 123 mm x 64 mm x 83 mm. Position: right lateral wall.  ____________________________________________________________________________  Report Summary:  Impression: Ms. Jocelyn Gracia is a 27yo G1 at 32w5 admitted for super-imposed pre-eclampsia with severe features. Her BP at admission was severe range. Her Procardia was increased from 30XL qd to 90XL qd, and labetalol 200 bid. Her Upr/cr increased from 0.4 to 0.7. Since admission, her BP has been normal and ocasionally mild range. Labs are normal (today hgb 10.8, plt 356, crea 0.48, ast 13). She denies and never had any symptom of pre-eclampsia this admission. ULTRASOUND:    Today we see a cephalic SLIUP with AGA growth (EFW at 47% and AC at 85%). Mild polyhydramnios (RITA 30) is noted. BPP is 8/8. Reassuring fetal surveillance. COUNSELING:     Today we discussed that the working diagnosis of severe pre-eclampsia could be reconsidered. She did initially meet criteria due to several severe range BPs. It is possible that the anti-hypertensives are masking elevations in blood pressure, or that these first elevated BP results were spurious measurements. Her BP has been quite stable in the normal to slightly elevated mild range since admission three weeks ago.     I let the patient that at this time it may be appropriate to \"down grade\" her diagnosis to super-imposed pre-eclampsia without severe features. I was clear however that if we manage her as a mild pre-eclamptic, she should be checking blood pressures at home at least 1-2 times daily, she should return to hospital immediately for SBP >160 or DBP >110 or headache or vision change or malaise. She states that she lives 10-15 min away and can return quickly. Also, she must have labs checked at least weekly and twice weekly surveillance. The patient desires discharge to home and understands follow up criteria. I provided time for the patient and her  to ask questions and to have them all answered. I also reviewed the plan with Dr. Contreras Vásquez who agrees to outpatient management at this time. Recommendations: Patient can be considered to have super-imposed pre-eclampsia without severe features. Consider outpatient management with pre-eclampsia precuations at this time. Patient should be seen at least twice weekly for surveillance and blood pressure checks. Pre-eclampsia labs should be drawn at least weekly. The patient should check her BP at home at least once daily. Delivery is recommended at 37w0 so long as she otherwise remains stable.       Bonita Boast, MD  Maternal Fetal Medicine

## 2021-11-30 NOTE — PROGRESS NOTES
Bedside and Verbal shift change report given to Yvette Mendoza RN (oncoming nurse) by Andi Matos RN (offgoing nurse). Report included the following information SBAR, Kardex, Procedure Summary, Intake/Output, MAR, Accordion and Recent Results.

## 2021-11-30 NOTE — PROGRESS NOTES
Long discussion with MFM today. Dr. Delmi Flores has relooked at the patient's chart, BPs, monitoring and notes that she demonstrates extreme stability and BPs have been normal to mild range since admission. He is suggesting that her diagnosis is actually more consistent with SI Pre E without SF. He feels (and I agree) that given the stability during inpatient management that we will modify her diagnosis to SI Pre E without SF which places her in position for outpatient management with twice weekly f/u in the office. The patient strongly desires this. She agrees to do twice daily kick counts and BP monitoring with twice weekly BPP in office (Tues/Fri) and once weekly labs. She is to monitor for sxs of severe features of PreE and is to return for inpatient evaluation/management should concern for severe features arise. She has a PICC line and needs removal of her central line done at the bedside prior to discharge. I will perform the removal.  Discharge precautions reviewed and questions were answered. NSTs were reviewed from yesterday and today and both were REACTIVE. > 1 hour was spent discussing plan with MFM with patient and family and answering questions with patient and family.       Issac Alan MD

## 2021-11-30 NOTE — DISCHARGE INSTRUCTIONS
Patient Education        Preeclampsia: Care Instructions  Overview     Preeclampsia occurs when a woman's blood pressure rises during pregnancy. Often with preeclampsia, you also have swelling in your legs, hands, and face. A test may show too much protein in your urine. If preeclampsia is severe and not treated, it can lead to seizures (eclampsia) and damage to your liver or kidneys. Preeclampsia can prevent your baby from getting enough food and oxygen. This can cause a low birth weight or other problems. Your doctor will watch you closely to prevent these problems. Your doctor also may recommend that you reduce your activity. If your preeclampsia is a danger to your health or the health of your baby, your doctor may need to deliver your baby early. While preeclampsia is a concern, most women with preeclampsia have healthy babies. After a woman gives birth, preeclampsia usually goes away on its own. But symptoms may last a few weeks or more and can get worse after delivery. Rarely, symptoms of preeclampsia don't show up until days or even weeks after childbirth. Follow-up care is a key part of your treatment and safety. Be sure to make and go to all appointments, and call your doctor if you are having problems. It's also a good idea to know your test results and keep a list of the medicines you take. How can you care for yourself at home? · Take and record your blood pressure at home if your doctor tells you to. ? Ask your doctor to check your blood pressure monitor to be sure that it is accurate and that the cuff fits you. Also ask your doctor to watch you to make sure that you are using it right. ? You should not eat, use tobacco products, or use medicine known to raise blood pressure (such as some nasal decongestant sprays) before you take your blood pressure. ? Avoid taking your blood pressure if you have just exercised. Also avoid taking it if you are nervous or upset.  Rest at least 15 minutes before you take your blood pressure. · You may need to take medicine to manage your blood pressure. Take your medicines exactly as prescribed. Call your doctor if you think you are having a problem with your medicine. · Do not smoke. Quitting smoking will help improve your baby's growth and health. If you need help quitting, talk to your doctor about stop-smoking programs and medicines. These can increase your chances of quitting for good. · Eat a balanced and healthy diet that has lots of fruits and vegetables. · You can keep track of your baby's health by checking your baby's movement. A common method for this is to note the length of time it takes to count 10 movements (such as kicks, flutters, or rolls). Call your doctor if you don't feel at least 10 movements in a 2-hour period. Track your baby's movements once each day. Bring this record with you to each prenatal visit. When should you call for help? Share this information with your partner or a friend. They can help you watch for warning signs. Call 911  anytime you think you may need emergency care. For example, call if:    · You passed out (lost consciousness).     · You have a seizure. Call your doctor now or seek immediate medical care if:    · You have symptoms of preeclampsia, such as:  ? Sudden swelling of your face, hands, or feet. ? New vision problems (such as dimness, blurring, or seeing spots). ? A severe headache.     · Your blood pressure is very high, such as 160/110 or higher.     · Your blood pressure is higher than your doctor told you it should be, or it rises quickly.     · You have new nausea or vomiting.     · You think that you are in labor.     · You have pain in your belly or pelvis. Watch closely for changes in your health, and be sure to contact your doctor if:    · You gain weight rapidly. Where can you learn more?   Go to http://www.gray.com/  Enter Z954 in the search box to learn more about \"Preeclampsia: Care Instructions. \"  Current as of: June 16, 2021               Content Version: 13.0  © 8873-7849 Healthwise, Incorporated. Care instructions adapted under license by [employer] (which disclaims liability or warranty for this information). If you have questions about a medical condition or this instruction, always ask your healthcare professional. [place] [address] any warranty or liability for your use of this information.

## 2021-11-30 NOTE — PROGRESS NOTES
Patient laid flat and PICC line removed with valsalva without difficulty. Site cleaned. Gauze with bacitracin placed with pressure and tegaderm. Hemostasis excellent. Care of site reviewed. Discharged home as planned.     Kieran Solomon MD

## 2021-11-30 NOTE — PROGRESS NOTES
Ante Partum Progress Note    Yuli Stahl  [id number]    Assessment: 32w5d      1. CHTN with SI PreE w/ SF:  - Labile BPs with increase in requirement for antihypertensives on admission, stable since  - BPs now normotensive to mildly hypertensive, currently on Procardia 90 XL daily and Labetalol 200 mg BID  - normal labs on admission except for proteinuria (baseline p/c 0.44, now 0.7)  - labs on Tuesdays (normal 11/30)  - appreciate MFM consult 11/8 -> will continue inpatient management for now, plan for magnesium if BPs escalate, plan delivery at 34 wks, plan for weekly BPP and f/u with MFM while in house, continue BID NSTs and close BP management. Pt had questions about delivery at 34 weeks -- reviewed this is endpoint with pre-eclampsia w/SF and that continued expectant mgmt often increases risk of maternal morbidity without significant fetal benefit. Recommend she discuss further with MFM.   - BPP 8/10, vtx 11/22 (MFM visit TODAY pending)  - Pt voiced frustration 11/22 and 11/23 about prolonged hospital stay and our/MFM refusal for outpatient management.  Reviewed strategies to make her more comfortable here.  Plan VS checks and time them with med administration. Plan lab draws and BPPs scheduled on Tuesdays.  Consider PT if desires - previously declined by patient.     2. Prematurity  - 11/8 growth U/S EFW 1506g c/w 97%WJE, cephalic  - s/p BMZ x 2 69/0   - s/p NICU consult  - GBS neg 11/9     3.  GDM  - accuchecks QID - good glycemic control  - see diabetic treatment team note from 11/8 for recommendations if elevated BGs and insulin management is needed  - cont monthly growth scans     4. Venous Access  - s/p PICC line placement 11/16        Orders/Charges:  Med and Non Stress Test  X 2        Patient states she has no new complaints- no HA/CP/SOB/visual changes or RUQ pain. She denies ctx, vb, lof. +FM.   Some ongoing pressure but no CTX.     Vitals:  Visit Vitals  BP (!) 146/82 (BP 1 Location: Left upper arm, BP Patient Position: At rest)   Pulse (!) 104   Temp 98.3 °F (36.8 °C)   Resp 16   SpO2 98%     Temp (24hrs), Av.5 °F (36.9 °C), Min:98.3 °F (36.8 °C), Max:98.7 °F (37.1 °C)      Last 24hr Input/Output:  No intake or output data in the 24 hours ending 21 0830     Non stress test:     pm:   Baseline 150, moderate variaibility, + accels, no decels - REACTIVE     am:  Baseline 150, moderate variaibility, + accels, no decels - REACTIVE  Few contractions noted - pt asymptomatic from these      Exam:  Patient without distress.      Abdomen, fundus soft non-tender     Extremities, no redness or tenderness               Additional Exam: Patient without distress, Abdomen soft, non-tender, Fundus soft and non tender, Right upper quadrant non-tender, Perineum No sign of blood or amniotic fluid, Lower extremities edema No, Patellar Reflexes: 1+ bilaterally and Clonus: absent    Labs:     Lab Results   Component Value Date/Time    WBC 9.8 2021 06:10 AM    WBC 10.0 2021 03:31 AM    WBC 8.3 2021 10:52 AM    WBC 8.3 2021 06:02 AM    WBC 10.9 2021 11:17 PM    WBC 10.0 2021 11:39 AM    HGB 10.8 (L) 2021 06:10 AM    HGB 11.3 (L) 2021 03:31 AM    HGB 11.2 (L) 2021 10:52 AM    HGB 11.0 (L) 2021 06:02 AM    HGB 12.1 2021 11:17 PM    HGB 11.7 2021 11:39 AM    HCT 32.2 (L) 2021 06:10 AM    HCT 34.5 (L) 2021 03:31 AM    HCT 34.4 (L) 2021 10:52 AM    HCT 32.8 (L) 2021 06:02 AM    HCT 35.9 2021 11:17 PM    HCT 34.9 (L) 2021 11:39 AM    PLATELET 601  06:10 AM    PLATELET 791 7584 03:31 AM    PLATELET 059  10:52 AM    PLATELET 699  06:02 AM    PLATELET 378  11:17 PM    PLATELET 348  11:39 AM       Recent Results (from the past 24 hour(s))   GLUCOSE, POC    Collection Time: 21 11:03 AM   Result Value Ref Range    Glucose (POC) 116 65 - 117 mg/dL Performed by Sandie Herr (CON)    GLUCOSE, POC    Collection Time: 11/29/21  2:37 PM   Result Value Ref Range    Glucose (POC) 112 65 - 117 mg/dL    Performed by Sandie Herr. (CON)    GLUCOSE, POC    Collection Time: 11/29/21  6:51 PM   Result Value Ref Range    Glucose (POC) 115 65 - 117 mg/dL    Performed by Krishna Patino Ne, POC    Collection Time: 11/30/21  6:06 AM   Result Value Ref Range    Glucose (POC) 102 65 - 117 mg/dL    Performed by Florence Alvarez    CBC W/O DIFF    Collection Time: 11/30/21  6:10 AM   Result Value Ref Range    WBC 9.8 3.6 - 11.0 K/uL    RBC 3.57 (L) 3.80 - 5.20 M/uL    HGB 10.8 (L) 11.5 - 16.0 g/dL    HCT 32.2 (L) 35.0 - 47.0 %    MCV 90.2 80.0 - 99.0 FL    MCH 30.3 26.0 - 34.0 PG    MCHC 33.5 30.0 - 36.5 g/dL    RDW 13.6 11.5 - 14.5 %    PLATELET 542 453 - 841 K/uL    MPV 10.5 8.9 - 12.9 FL    NRBC 0.0 0  WBC    ABSOLUTE NRBC 0.00 0.00 - 7.38 K/uL   METABOLIC PANEL, COMPREHENSIVE    Collection Time: 11/30/21  6:10 AM   Result Value Ref Range    Sodium 136 136 - 145 mmol/L    Potassium 3.5 3.5 - 5.1 mmol/L    Chloride 106 97 - 108 mmol/L    CO2 19 (L) 21 - 32 mmol/L    Anion gap 11 5 - 15 mmol/L    Glucose 93 65 - 100 mg/dL    BUN 5 (L) 6 - 20 MG/DL    Creatinine 0.48 (L) 0.55 - 1.02 MG/DL    BUN/Creatinine ratio 10 (L) 12 - 20      GFR est AA >60 >60 ml/min/1.73m2    GFR est non-AA >60 >60 ml/min/1.73m2    Calcium 10.1 8.5 - 10.1 MG/DL    Bilirubin, total 0.4 0.2 - 1.0 MG/DL    ALT (SGPT) 30 12 - 78 U/L    AST (SGOT) 13 (L) 15 - 37 U/L    Alk.  phosphatase 124 (H) 45 - 117 U/L    Protein, total 6.7 6.4 - 8.2 g/dL    Albumin 2.7 (L) 3.5 - 5.0 g/dL    Globulin 4.0 2.0 - 4.0 g/dL    A-G Ratio 0.7 (L) 1.1 - 2.2

## 2021-12-08 ENCOUNTER — HOSPITAL ENCOUNTER (OUTPATIENT)
Age: 36
Setting detail: OBSERVATION
LOS: 1 days | Discharge: HOME | End: 2021-12-08
Attending: OBSTETRICS & GYNECOLOGY | Admitting: OBSTETRICS & GYNECOLOGY
Payer: COMMERCIAL

## 2021-12-08 VITALS
DIASTOLIC BLOOD PRESSURE: 91 MMHG | SYSTOLIC BLOOD PRESSURE: 137 MMHG | BODY MASS INDEX: 34.55 KG/M2 | WEIGHT: 183 LBS | HEIGHT: 61 IN | HEART RATE: 96 BPM

## 2021-12-08 PROBLEM — O14.90 PREECLAMPSIA: Status: ACTIVE | Noted: 2021-12-08

## 2021-12-08 LAB
ALBUMIN SERPL-MCNC: 3.1 G/DL (ref 3.5–5)
ALBUMIN/GLOB SERPL: 0.8 {RATIO} (ref 1.1–2.2)
ALP SERPL-CCNC: 150 U/L (ref 45–117)
ALT SERPL-CCNC: 26 U/L (ref 12–78)
ANION GAP SERPL CALC-SCNC: 11 MMOL/L (ref 5–15)
AST SERPL-CCNC: 14 U/L (ref 15–37)
BASOPHILS # BLD: 0 K/UL (ref 0–0.1)
BASOPHILS NFR BLD: 0 % (ref 0–1)
BILIRUB SERPL-MCNC: 0.5 MG/DL (ref 0.2–1)
BUN SERPL-MCNC: 5 MG/DL (ref 6–20)
BUN/CREAT SERPL: 8 (ref 12–20)
CALCIUM SERPL-MCNC: 10.5 MG/DL (ref 8.5–10.1)
CHLORIDE SERPL-SCNC: 106 MMOL/L (ref 97–108)
CO2 SERPL-SCNC: 20 MMOL/L (ref 21–32)
CREAT SERPL-MCNC: 0.64 MG/DL (ref 0.55–1.02)
DIFFERENTIAL METHOD BLD: ABNORMAL
EOSINOPHIL # BLD: 0 K/UL (ref 0–0.4)
EOSINOPHIL NFR BLD: 0 % (ref 0–7)
ERYTHROCYTE [DISTWIDTH] IN BLOOD BY AUTOMATED COUNT: 13.5 % (ref 11.5–14.5)
GLOBULIN SER CALC-MCNC: 3.7 G/DL (ref 2–4)
GLUCOSE SERPL-MCNC: 91 MG/DL (ref 65–100)
HCT VFR BLD AUTO: 35.9 % (ref 35–47)
HGB BLD-MCNC: 11.9 G/DL (ref 11.5–16)
IMM GRANULOCYTES # BLD AUTO: 0 K/UL (ref 0–0.04)
IMM GRANULOCYTES NFR BLD AUTO: 0 % (ref 0–0.5)
LDH SERPL L TO P-CCNC: 168 U/L (ref 81–246)
LYMPHOCYTES # BLD: 1.8 K/UL (ref 0.8–3.5)
LYMPHOCYTES NFR BLD: 21 % (ref 12–49)
MCH RBC QN AUTO: 29.8 PG (ref 26–34)
MCHC RBC AUTO-ENTMCNC: 33.1 G/DL (ref 30–36.5)
MCV RBC AUTO: 89.8 FL (ref 80–99)
MONOCYTES # BLD: 0.8 K/UL (ref 0–1)
MONOCYTES NFR BLD: 10 % (ref 5–13)
NEUTS SEG # BLD: 5.7 K/UL (ref 1.8–8)
NEUTS SEG NFR BLD: 69 % (ref 32–75)
NRBC # BLD: 0 K/UL (ref 0–0.01)
NRBC BLD-RTO: 0 PER 100 WBC
PLATELET # BLD AUTO: 407 K/UL (ref 150–400)
PMV BLD AUTO: 10.4 FL (ref 8.9–12.9)
POTASSIUM SERPL-SCNC: 3.9 MMOL/L (ref 3.5–5.1)
PROT SERPL-MCNC: 6.8 G/DL (ref 6.4–8.2)
RBC # BLD AUTO: 4 M/UL (ref 3.8–5.2)
SODIUM SERPL-SCNC: 137 MMOL/L (ref 136–145)
URATE SERPL-MCNC: 4.7 MG/DL (ref 2.6–6)
WBC # BLD AUTO: 8.4 K/UL (ref 3.6–11)

## 2021-12-08 PROCEDURE — 83615 LACTATE (LD) (LDH) ENZYME: CPT

## 2021-12-08 PROCEDURE — 36415 COLL VENOUS BLD VENIPUNCTURE: CPT

## 2021-12-08 PROCEDURE — G0378 HOSPITAL OBSERVATION PER HR: HCPCS

## 2021-12-08 PROCEDURE — 84550 ASSAY OF BLOOD/URIC ACID: CPT

## 2021-12-08 PROCEDURE — 80053 COMPREHEN METABOLIC PANEL: CPT

## 2021-12-08 PROCEDURE — 85025 COMPLETE CBC W/AUTO DIFF WBC: CPT

## 2021-12-08 PROCEDURE — 99283 EMERGENCY DEPT VISIT LOW MDM: CPT

## 2021-12-08 NOTE — DISCHARGE SUMMARY
Antepartum  Discharge Summary     Patient ID:  Yohana Angeles  [id number]  52 y.o.  1985    Admit date: 12/8/2021    Discharge date: 12/8/2021    Admission Diagnoses:    Patient Active Problem List   Diagnosis Code    Hypertension affecting pregnancy O16.9    29 weeks gestation of pregnancy Z3A.29    Chronic hypertension affecting pregnancy O10.919    Chronic hypertension with superimposed pre-eclampsia O11.9    Gestational diabetes O24.419    Preeclampsia O14.90       Discharge Diagnoses: There are no discharge diagnoses documented for the most recent discharge. Patient Active Problem List   Diagnosis Code    Hypertension affecting pregnancy O16.9    29 weeks gestation of pregnancy Z3A.29    Chronic hypertension affecting pregnancy O10.919    Chronic hypertension with superimposed pre-eclampsia O11.9    Gestational diabetes O24.419    Preeclampsia O14.90       Procedures for this admission: NST, labs, BP monitoring    Hospital Course: Patient was evaluated for blurry vision. Labs were stable, BPs mild range. Her PreEclampsia is stable. She was discharged home and will keep her follow-up tomorrow. Her symptoms may be related to the dose change of her Labetalol (she was just increased from 200mg BID to 300mg TID). Disposition: Home or self care    Discharged Condition: stable            Patient Instructions:   Current Discharge Medication List      CONTINUE these medications which have NOT CHANGED    Details   NIFEdipine ER (PROCARDIA XL) 90 mg ER tablet Take 1 Tablet by mouth daily. Qty: 30 Tablet, Refills: 2      labetaloL (NORMODYNE) 200 mg tablet Take 1 Tablet by mouth every twelve (12) hours. Qty: 60 Tablet, Refills: 2      loratadine-pseudoephedrine (Claritin-D 24 Hour)  mg per tablet Take 1 Tablet by mouth daily. Indications: seasonal runny nose      aspirin 81 mg chewable tablet Take 81 mg by mouth daily.  Indications: treatment to prevent peripheral artery thromboembolism      PNV Comb #2-Iron-FA-Omega 3 29-1-400 mg cmpk Take  by mouth. Blood-Glucose Meter monitoring kit Take blood glucoses 4 x daily as specified  Qty: 1 Kit, Refills: 0      glucose blood VI test strips (Advanced Gluc Meter Test Strip) strip Use as needed for 4x daily blood glucose readings  Qty: 100 Strip, Refills: 1           Activity: light activity at home    Diet: Regular Diet    Follow-up with   Follow-up Appointments   Procedures    FOLLOW UP VISIT Appointment in: Other (Specify) tomorrow     tomorrow     Standing Status:   Standing     Number of Occurrences:   1     Order Specific Question:   Appointment in     Answer:    Other (Specify)        Signed:  Navarro Pritchard MD  12/8/2021  5:56 PM       .

## 2021-12-08 NOTE — H&P
History & Physical    Name: Naif Lazo MRN: [id number]  SSN: [id number]    YOB: 1985  Age: 31 y.o. Sex: female        Subjective:     Estimated Date of Delivery: 22  OB History    Para Term  AB Living   1             SAB IAB Ectopic Molar Multiple Live Births                    # Outcome Date GA Lbr Jose/2nd Weight Sex Delivery Anes PTL Lv   1 Current              cc: blurry vision    HPI:  34yo G1 @ 33w6d with a diagnosis of super imposed PreEclampsia without severe features (since her hospitalization in early November) presents today from home with a complaint of blurry vision. She has been on home bedrest and has twice weekly monitoring in the office. Her last ultrasound was , BPP  (NST was nonreactive). Her labs were normal  as well. Her next appt is tomorrow for ultrasound and office visit. She is taking Procardia 90mg XL daily and Labetalol 300mg PO TID. She reports good fetal movement and denies labor symptoms. She denies HA or RUQ pain. She has diet controlled gestational diabetes. IOL is scheduled for 37 wks. Past Medical History:   Diagnosis Date    Essential hypertension     Gestational diabetes 2021    Gestational hypertension     Migraines      No past surgical history on file. Social History     Occupational History    Not on file   Tobacco Use    Smoking status: Never Smoker    Smokeless tobacco: Never Used   Vaping Use    Vaping Use: Never used   Substance and Sexual Activity    Alcohol use: Not Currently     Alcohol/week: 0.0 - 0.8 standard drinks    Drug use: No    Sexual activity: Yes     Family History   Problem Relation Age of Onset    Hypertension Mother     Diabetes Paternal Grandmother      Allergies   Allergen Reactions    Sulfa (Sulfonamide Antibiotics) Hives, Itching and Myalgia     Prior to Admission medications    Medication Sig Start Date End Date Taking?  Authorizing Provider NIFEdipine ER (PROCARDIA XL) 90 mg ER tablet Take 1 Tablet by mouth daily. 12/1/21   Bushra Pal MD   labetaloL (NORMODYNE) 200 mg tablet Take 1 Tablet by mouth every twelve (12) hours. 11/30/21   Bushra Pal MD   Blood-Glucose Meter monitoring kit Take blood glucoses 4 x daily as specified 11/30/21   Bushra Pal MD   glucose blood VI test strips (Advanced Gluc Meter Test Strip) strip Use as needed for 4x daily blood glucose readings 11/30/21   Bushra Pal MD   loratadine-pseudoephedrine (Claritin-D 24 Hour)  mg per tablet Take 1 Tablet by mouth daily. Indications: seasonal runny nose    Provider, Historical   aspirin 81 mg chewable tablet Take 81 mg by mouth daily. Indications: treatment to prevent peripheral artery thromboembolism    Provider, Historical   PNV Comb #2-Iron-FA-Omega 3 29-1-400 mg cmpk Take  by mouth. Provider, Historical        Review of Systems negative except for blurry vision    Objective:     Vitals:  Vitals:    12/08/21 1550   BP: (!) 147/102   Pulse: (!) 108        Physical Exam  GEN: NAD, resting comfortably  Pulm: no resp distress  Abd: soft, gravid, NT  : deferred    Fetal Heart Rate:140, moderate variability, positive accelerations, no decelerations  Tocometry: infrequent contractions present    Prenatal Labs:   Lab Results   Component Value Date/Time    Rubella, External immune 07/02/2021 12:00 AM    GrBStrep, External Negative 11/12/2021 12:00 AM    HBsAg, External nonreactive 07/02/2021 12:00 AM    HIV, External nonreactive 07/02/2021 12:00 AM    Gonorrhea, External negative 09/02/2021 12:00 AM    Chlamydia, External negative 09/02/2021 12:00 AM    ABO,Rh O pos 07/02/2021 12:00 AM          Impression/Plan:     34yo G1 with superimposed PreEclampsia presents to labor and delivery to be evaluated for blurry vision. 1. Blurry vision: will check serial BPs and repeat labs to look for indications of worsening PreEclampsia.      2. IUP: reassuring fetal surveillance    3. A1GDM: has been well controlled with normal fetal growth (EFW 71% 12/2/21)    4. COVID vaccinated and asymptomatic. Will send rapid test if patient requires admission.

## 2021-12-08 NOTE — PROGRESS NOTES
In to see patient  Mild range BPs  Reactive and reassuring fetal surveillance  Labs stable/ normal for this stage of pregnancy    After further discussion, her symptoms seem to be after her Labetalol intake (she was just increased to a higher dose this past weekend). When she's in between doses/ ready for her next dose, her blurry vision seems to improve. PreEclampsia seems stable. Recommend discharge home today and keep her follow-up with ultrasound tomorrow in the office. We may need to change her medication regimen if she remains symptomatic after dosing her Labetalol    Patient is comfortable with the plan. Precautions reviewed.   She's comfortable with the plan to discharge home

## 2021-12-08 NOTE — DISCHARGE INSTRUCTIONS
Patient Education        Preeclampsia: Care Instructions  Overview     Preeclampsia occurs when a woman's blood pressure rises during pregnancy. Often with preeclampsia, you also have swelling in your legs, hands, and face. A test may show too much protein in your urine. If preeclampsia is severe and not treated, it can lead to seizures (eclampsia) and damage to your liver or kidneys. Preeclampsia can prevent your baby from getting enough food and oxygen. This can cause a low birth weight or other problems. Your doctor will watch you closely to prevent these problems. Your doctor also may recommend that you reduce your activity. If your preeclampsia is a danger to your health or the health of your baby, your doctor may need to deliver your baby early. While preeclampsia is a concern, most women with preeclampsia have healthy babies. After a woman gives birth, preeclampsia usually goes away on its own. But symptoms may last a few weeks or more and can get worse after delivery. Rarely, symptoms of preeclampsia don't show up until days or even weeks after childbirth. Follow-up care is a key part of your treatment and safety. Be sure to make and go to all appointments, and call your doctor if you are having problems. It's also a good idea to know your test results and keep a list of the medicines you take. How can you care for yourself at home? · Take and record your blood pressure at home if your doctor tells you to. ? Ask your doctor to check your blood pressure monitor to be sure that it is accurate and that the cuff fits you. Also ask your doctor to watch you to make sure that you are using it right. ? You should not eat, use tobacco products, or use medicine known to raise blood pressure (such as some nasal decongestant sprays) before you take your blood pressure. ? Avoid taking your blood pressure if you have just exercised. Also avoid taking it if you are nervous or upset.  Rest at least 15 minutes before you take your blood pressure. · You may need to take medicine to manage your blood pressure. Take your medicines exactly as prescribed. Call your doctor if you think you are having a problem with your medicine. · Do not smoke. Quitting smoking will help improve your baby's growth and health. If you need help quitting, talk to your doctor about stop-smoking programs and medicines. These can increase your chances of quitting for good. · Eat a balanced and healthy diet that has lots of fruits and vegetables. · You can keep track of your baby's health by checking your baby's movement. A common method for this is to note the length of time it takes to count 10 movements (such as kicks, flutters, or rolls). Call your doctor if you don't feel at least 10 movements in a 2-hour period. Track your baby's movements once each day. Bring this record with you to each prenatal visit. When should you call for help? Share this information with your partner or a friend. They can help you watch for warning signs. Call 911  anytime you think you may need emergency care. For example, call if:    · You passed out (lost consciousness).     · You have a seizure. Call your doctor now or seek immediate medical care if:    · You have symptoms of preeclampsia, such as:  ? Sudden swelling of your face, hands, or feet. ? New vision problems (such as dimness, blurring, or seeing spots). ? A severe headache.     · Your blood pressure is very high, such as 160/110 or higher.     · Your blood pressure is higher than your doctor told you it should be, or it rises quickly.     · You have new nausea or vomiting.     · You think that you are in labor.     · You have pain in your belly or pelvis. Watch closely for changes in your health, and be sure to contact your doctor if:    · You gain weight rapidly. Where can you learn more?   Go to http://www.gray.com/  Enter Z954 in the search box to learn more about \"Preeclampsia: Care Instructions. \"  Current as of: June 16, 2021               Content Version: 13.0  © 8245-0879 [employer]wise, Incorporated. Care instructions adapted under license by [employer] (which disclaims liability or warranty for this information). If you have questions about a medical condition or this instruction, always ask your healthcare professional. [place] [address] any warranty or liability for your use of this information.

## 2021-12-08 NOTE — PROGRESS NOTES
12/8/2021  3:45 PM Patient arrived to [facility] 12 accompanied by partner c/o new onset blurry vision. Patient reports positive fetal movement and denies VB or LOF. Patient states blurry vision started yesterday afternoon, has since improved but still present, denies floaters. Of note, patient recently increased Labetalol to 300mg PO tid on 12/5/21.     1553 Dr. Amrita Ramirez notified that patient arrived. 1600 Labs drawn and sent. 1615 NST, reactive. 5 Dr. Amrita Ramirez at bedside to see patient and discuss plan of care. [id number] Patient up to bathroom. EFM reapplied. 3252 Dr. Amrita Ramirez at bedside, patient cleared for discharge. Education and labor precautions reviewed, patient verbalized understanding. All questions and concerns addressed at this time. Patient has f/u appointment scheduled for tomorrow in office. 1611 Patient ambulated off unit with partner at side.

## 2021-12-29 NOTE — H&P
History & Physical    Name: Pan Tate MRN: [id number]  SSN: [id number]    YOB: 1985  Age: 31 y.o. Sex: female      Subjective:     Estimated Date of Delivery: 22  OB History    Para Term  AB Living   1             SAB IAB Ectopic Molar Multiple Live Births                    # Outcome Date GA Lbr Jose/2nd Weight Sex Delivery Anes PTL Lv   1 Current                Ms. Frias Record is admitted with pregnancy at 37w0d for induction of labor due to preeclampsia without SF. Prenatal course is complicated by:  1. CHTN: diagnosed at 16 wks gestation and was on procardia 90 XL daily    2. SI preE w/o SF: Patient was admitted - due to exacerbation of BPs and need for additional anithypertensives (labetalol 200 mg bid was added to her regimen at that time) so was thought to have preE with severe features. Her urine p/c also increased from a baseline of .44 to .7. She completed  corticosteroids on . After 3 weeks of stable BPs/labs and no symptoms of SF her diagnosis was reassessed by MFM and patient's diagnosis was reclassified as preE without SF. She was discharged home with close outpatient surveillance. -BPs have been normal to mild range on procardia 90 xl daily along with labetalol 300 mg tid    3. GDMA1  -Well controlled with diet alone     Patient is feeling well. She denies HA, visual changes, RUQ pain, ctx, vb, lof. +FM. -Please see prenatal records for details. Past Medical History:   Diagnosis Date    Essential hypertension     Gestational diabetes 2021    Diet controlled     Gestational hypertension     Migraines      No past surgical history on file.   Social History     Occupational History    Not on file   Tobacco Use    Smoking status: Never Smoker    Smokeless tobacco: Never Used   Vaping Use    Vaping Use: Never used   Substance and Sexual Activity    Alcohol use: Not Currently     Alcohol/week: 0.0 - 0.8 standard drinks  Drug use: No    Sexual activity: Yes     Family History   Problem Relation Age of Onset    Hypertension Mother     Diabetes Paternal Grandmother        Allergies   Allergen Reactions    Sulfa (Sulfonamide Antibiotics) Hives, Itching and Myalgia     Prior to Admission medications    Medication Sig Start Date End Date Taking? Authorizing Provider   NIFEdipine ER (PROCARDIA XL) 90 mg ER tablet Take 1 Tablet by mouth daily. 12/1/21   Liz Villeda MD   labetaloL (NORMODYNE) 200 mg tablet Take 1 Tablet by mouth every twelve (12) hours. Patient taking differently: Take 300 mg by mouth three (3) times daily. 11/30/21   Liz Villeda MD   Blood-Glucose Meter monitoring kit Take blood glucoses 4 x daily as specified 11/30/21   Liz Villeda MD   glucose blood VI test strips (Advanced Gluc Meter Test Strip) strip Use as needed for 4x daily blood glucose readings 11/30/21   Liz Villeda MD   loratadine-pseudoephedrine (Claritin-D 24 Hour)  mg per tablet Take 1 Tablet by mouth daily. Indications: seasonal runny nose    Provider, Historical   aspirin 81 mg chewable tablet Take 81 mg by mouth daily. Indications: treatment to prevent peripheral artery thromboembolism    Provider, Historical   PNV Comb #2-Iron-FA-Omega 3 29-1-400 mg cmpk Take  by mouth. Provider, Historical        Review of Systems: A comprehensive review of systems was negative except for that written in the History of Present Illness. Objective:     Vitals: There were no vitals filed for this visit. Physical Exam:  Patient without distress.   Lung: normal respiratory effort  Abdomen: soft, nontender  Fundus: soft and non tender  Perineum: blood absent, amniotic fluid absent  Cervical Exam: 0/80/-2  Lower Extremities:  - Edema No  Membranes:  Intact  Fetal Heart Rate: Reactive          Prenatal Labs:   Lab Results   Component Value Date/Time    Rubella, External immune 07/02/2021 12:00 AM    HBsAg, External nonreactive 07/02/2021 12:00 AM    HIV, External nonreactive 2021 12:00 AM    Gonorrhea, External negative 2021 12:00 AM    Chlamydia, External negative 2021 12:00 AM    ABO,Rh O pos 2021 12:00 AM    GrBStrep, External Negative 2021 12:00 AM     COVID vaccinated     MFM US : EFW 2859g, 64CR%MSD, cephalic, BPP     Impression/Plan:     Active Problems:    * No active hospital problems. *     30 y/o  with IUP at 37 wks, IOL for [facility] with superimposed preeclampsia without severe features. Plan:     1. CHTN with SI preE w/o SF  -Admit for induction of labor. -Group B Strep negative.  -Routine admission and preE labs  -Continue home antihypertensives  -Watch for SF and start magnesium if indicated    2.  GDMA1  -Accuchecks y7bvekm and then q2 hours in active phase of labor

## 2021-12-30 ENCOUNTER — HOSPITAL ENCOUNTER (INPATIENT)
Age: 36
LOS: 4 days | Discharge: HOME | End: 2022-01-03
Attending: OBSTETRICS & GYNECOLOGY | Admitting: OBSTETRICS & GYNECOLOGY
Payer: COMMERCIAL

## 2021-12-30 PROBLEM — Z3A.37 37 WEEKS GESTATION OF PREGNANCY: Status: ACTIVE | Noted: 2021-12-30

## 2021-12-30 LAB
ALBUMIN SERPL-MCNC: 3.2 G/DL (ref 3.5–5)
ALBUMIN/GLOB SERPL: 0.9 {RATIO} (ref 1.1–2.2)
ALP SERPL-CCNC: 188 U/L (ref 45–117)
ALT SERPL-CCNC: 22 U/L (ref 12–78)
ANION GAP SERPL CALC-SCNC: 12 MMOL/L (ref 5–15)
AST SERPL-CCNC: 13 U/L (ref 15–37)
BILIRUB SERPL-MCNC: 0.7 MG/DL (ref 0.2–1)
BUN SERPL-MCNC: 6 MG/DL (ref 6–20)
BUN/CREAT SERPL: 9 (ref 12–20)
CALCIUM SERPL-MCNC: 10.5 MG/DL (ref 8.5–10.1)
CHLORIDE SERPL-SCNC: 105 MMOL/L (ref 97–108)
CO2 SERPL-SCNC: 19 MMOL/L (ref 21–32)
COVID-19 RAPID TEST, COVR: NOT DETECTED
CREAT SERPL-MCNC: 0.64 MG/DL (ref 0.55–1.02)
CREAT UR-MCNC: 35.6 MG/DL
ERYTHROCYTE [DISTWIDTH] IN BLOOD BY AUTOMATED COUNT: 14.5 % (ref 11.5–14.5)
GLOBULIN SER CALC-MCNC: 3.5 G/DL (ref 2–4)
GLUCOSE BLD STRIP.AUTO-MCNC: 111 MG/DL (ref 65–117)
GLUCOSE SERPL-MCNC: 89 MG/DL (ref 65–100)
HCT VFR BLD AUTO: 35.6 % (ref 35–47)
HGB BLD-MCNC: 12.1 G/DL (ref 11.5–16)
MCH RBC QN AUTO: 30.4 PG (ref 26–34)
MCHC RBC AUTO-ENTMCNC: 34 G/DL (ref 30–36.5)
MCV RBC AUTO: 89.4 FL (ref 80–99)
NRBC # BLD: 0 K/UL (ref 0–0.01)
NRBC BLD-RTO: 0 PER 100 WBC
PLATELET # BLD AUTO: 339 K/UL (ref 150–400)
PMV BLD AUTO: 11.4 FL (ref 8.9–12.9)
POTASSIUM SERPL-SCNC: 4.1 MMOL/L (ref 3.5–5.1)
PROT SERPL-MCNC: 6.7 G/DL (ref 6.4–8.2)
PROT UR-MCNC: 10 MG/DL (ref 0–11.9)
PROT/CREAT UR-RTO: 0.3
RBC # BLD AUTO: 3.98 M/UL (ref 3.8–5.2)
SERVICE CMNT-IMP: NORMAL
SODIUM SERPL-SCNC: 136 MMOL/L (ref 136–145)
SOURCE, COVRS: NORMAL
WBC # BLD AUTO: 9.1 K/UL (ref 3.6–11)

## 2021-12-30 PROCEDURE — 80053 COMPREHEN METABOLIC PANEL: CPT

## 2021-12-30 PROCEDURE — 36415 COLL VENOUS BLD VENIPUNCTURE: CPT

## 2021-12-30 PROCEDURE — 82962 GLUCOSE BLOOD TEST: CPT

## 2021-12-30 PROCEDURE — 74011250637 HC RX REV CODE- 250/637: Performed by: OBSTETRICS & GYNECOLOGY

## 2021-12-30 PROCEDURE — 65270000029 HC RM PRIVATE

## 2021-12-30 PROCEDURE — 3E0DXGC INTRODUCTION OF OTHER THERAPEUTIC SUBSTANCE INTO MOUTH AND PHARYNX, EXTERNAL APPROACH: ICD-10-PCS | Performed by: OBSTETRICS & GYNECOLOGY

## 2021-12-30 PROCEDURE — 87635 SARS-COV-2 COVID-19 AMP PRB: CPT

## 2021-12-30 PROCEDURE — 3E033VJ INTRODUCTION OF OTHER HORMONE INTO PERIPHERAL VEIN, PERCUTANEOUS APPROACH: ICD-10-PCS | Performed by: OBSTETRICS & GYNECOLOGY

## 2021-12-30 PROCEDURE — 74011250636 HC RX REV CODE- 250/636: Performed by: OBSTETRICS & GYNECOLOGY

## 2021-12-30 PROCEDURE — 85027 COMPLETE CBC AUTOMATED: CPT

## 2021-12-30 PROCEDURE — 75410000002 HC LABOR FEE PER 1 HR

## 2021-12-30 PROCEDURE — 84156 ASSAY OF PROTEIN URINE: CPT

## 2021-12-30 PROCEDURE — 59200 INSERT CERVICAL DILATOR: CPT

## 2021-12-30 RX ORDER — TERBUTALINE SULFATE 1 MG/ML
0.25 INJECTION SUBCUTANEOUS AS NEEDED
Status: DISCONTINUED | OUTPATIENT
Start: 2021-12-30 | End: 2022-01-01 | Stop reason: HOSPADM

## 2021-12-30 RX ORDER — NIFEDIPINE 30 MG/1
30 TABLET, EXTENDED RELEASE ORAL 2 TIMES DAILY
Status: DISCONTINUED | OUTPATIENT
Start: 2021-12-30 | End: 2021-12-30

## 2021-12-30 RX ORDER — BUTORPHANOL TARTRATE 1 MG/ML
1 INJECTION INTRAMUSCULAR; INTRAVENOUS
Status: DISCONTINUED | OUTPATIENT
Start: 2021-12-30 | End: 2022-01-01 | Stop reason: HOSPADM

## 2021-12-30 RX ORDER — OXYTOCIN/RINGER'S LACTATE 30/500 ML
87.3 PLASTIC BAG, INJECTION (ML) INTRAVENOUS AS NEEDED
Status: COMPLETED | OUTPATIENT
Start: 2021-12-30 | End: 2022-01-01

## 2021-12-30 RX ORDER — ONDANSETRON 2 MG/ML
4 INJECTION INTRAMUSCULAR; INTRAVENOUS
Status: DISCONTINUED | OUTPATIENT
Start: 2021-12-30 | End: 2022-01-01 | Stop reason: HOSPADM

## 2021-12-30 RX ORDER — FENTANYL CITRATE 50 UG/ML
25 INJECTION, SOLUTION INTRAMUSCULAR; INTRAVENOUS
Status: DISCONTINUED | OUTPATIENT
Start: 2021-12-30 | End: 2022-01-01 | Stop reason: HOSPADM

## 2021-12-30 RX ORDER — OXYTOCIN/RINGER'S LACTATE 30/500 ML
10 PLASTIC BAG, INJECTION (ML) INTRAVENOUS AS NEEDED
Status: COMPLETED | OUTPATIENT
Start: 2021-12-30 | End: 2022-01-01

## 2021-12-30 RX ORDER — SODIUM CHLORIDE 0.9 % (FLUSH) 0.9 %
5-40 SYRINGE (ML) INJECTION AS NEEDED
Status: DISCONTINUED | OUTPATIENT
Start: 2021-12-30 | End: 2022-01-01 | Stop reason: HOSPADM

## 2021-12-30 RX ORDER — SODIUM CHLORIDE 0.9 % (FLUSH) 0.9 %
5-40 SYRINGE (ML) INJECTION EVERY 8 HOURS
Status: DISCONTINUED | OUTPATIENT
Start: 2021-12-30 | End: 2022-01-01 | Stop reason: HOSPADM

## 2021-12-30 RX ORDER — OXYTOCIN/RINGER'S LACTATE 30/500 ML
0-20 PLASTIC BAG, INJECTION (ML) INTRAVENOUS
Status: DISCONTINUED | OUTPATIENT
Start: 2021-12-31 | End: 2022-01-01 | Stop reason: HOSPADM

## 2021-12-30 RX ORDER — SODIUM CHLORIDE, SODIUM LACTATE, POTASSIUM CHLORIDE, CALCIUM CHLORIDE 600; 310; 30; 20 MG/100ML; MG/100ML; MG/100ML; MG/100ML
125 INJECTION, SOLUTION INTRAVENOUS CONTINUOUS
Status: DISCONTINUED | OUTPATIENT
Start: 2021-12-30 | End: 2022-01-02

## 2021-12-30 RX ADMIN — LABETALOL HYDROCHLORIDE 300 MG: 200 TABLET, FILM COATED ORAL at 21:32

## 2021-12-30 RX ADMIN — SODIUM CHLORIDE, POTASSIUM CHLORIDE, SODIUM LACTATE AND CALCIUM CHLORIDE 125 ML/HR: 600; 310; 30; 20 INJECTION, SOLUTION INTRAVENOUS at 21:33

## 2021-12-30 RX ADMIN — ONDANSETRON 4 MG: 2 INJECTION INTRAMUSCULAR; INTRAVENOUS at 21:01

## 2021-12-30 RX ADMIN — Medication 25 MCG: at 17:40

## 2021-12-30 RX ADMIN — BUTORPHANOL TARTRATE 1 MG: 1 INJECTION, SOLUTION INTRAMUSCULAR; INTRAVENOUS at 21:35

## 2021-12-30 NOTE — PROGRESS NOTES
80   32yo admitted s/o Dr. Gonsalo Sunshine for induction of labor at [address] [facility] with gestational diabetes, diet controlled and hypertension. 18  Dr. Elvira Barnett at bedside, cervical balloon placed with 60/60 per balloon, pt tolerated well.

## 2021-12-30 NOTE — PROGRESS NOTES
William placed 60V/60U with 25 mcg of vaginal misoprostol. Plan q4 cytotec x2 additional doses. Pitocin at 4 AM.  Plan continuous monitoring overnight.

## 2021-12-31 LAB
GLUCOSE BLD STRIP.AUTO-MCNC: 100 MG/DL (ref 65–117)
GLUCOSE BLD STRIP.AUTO-MCNC: 107 MG/DL (ref 65–117)
GLUCOSE BLD STRIP.AUTO-MCNC: 108 MG/DL (ref 65–117)
GLUCOSE BLD STRIP.AUTO-MCNC: 116 MG/DL (ref 65–117)
GLUCOSE BLD STRIP.AUTO-MCNC: 127 MG/DL (ref 65–117)
GLUCOSE BLD STRIP.AUTO-MCNC: 97 MG/DL (ref 65–117)
SERVICE CMNT-IMP: ABNORMAL
SERVICE CMNT-IMP: NORMAL

## 2021-12-31 PROCEDURE — 75410000002 HC LABOR FEE PER 1 HR

## 2021-12-31 PROCEDURE — 74011250637 HC RX REV CODE- 250/637: Performed by: OBSTETRICS & GYNECOLOGY

## 2021-12-31 PROCEDURE — 82962 GLUCOSE BLOOD TEST: CPT

## 2021-12-31 PROCEDURE — 74011250636 HC RX REV CODE- 250/636: Performed by: OBSTETRICS & GYNECOLOGY

## 2021-12-31 PROCEDURE — 65270000029 HC RM PRIVATE

## 2021-12-31 RX ADMIN — SODIUM CHLORIDE, POTASSIUM CHLORIDE, SODIUM LACTATE AND CALCIUM CHLORIDE 125 ML/HR: 600; 310; 30; 20 INJECTION, SOLUTION INTRAVENOUS at 05:29

## 2021-12-31 RX ADMIN — Medication 25 MG: at 00:38

## 2021-12-31 RX ADMIN — NIFEDIPINE 90 MG: 30 TABLET, FILM COATED, EXTENDED RELEASE ORAL at 09:07

## 2021-12-31 RX ADMIN — LABETALOL HYDROCHLORIDE 300 MG: 200 TABLET, FILM COATED ORAL at 13:55

## 2021-12-31 RX ADMIN — OXYTOCIN 1 MILLI-UNITS/MIN: 10 INJECTION INTRAVENOUS at 05:30

## 2021-12-31 RX ADMIN — LABETALOL HYDROCHLORIDE 300 MG: 200 TABLET, FILM COATED ORAL at 05:30

## 2021-12-31 RX ADMIN — BUTORPHANOL TARTRATE 1 MG: 1 INJECTION, SOLUTION INTRAMUSCULAR; INTRAVENOUS at 00:37

## 2021-12-31 RX ADMIN — LABETALOL HYDROCHLORIDE 300 MG: 200 TABLET, FILM COATED ORAL at 22:55

## 2021-12-31 RX ADMIN — Medication 25 MCG: at 00:32

## 2021-12-31 RX ADMIN — SODIUM CHLORIDE, POTASSIUM CHLORIDE, SODIUM LACTATE AND CALCIUM CHLORIDE 125 ML/HR: 600; 310; 30; 20 INJECTION, SOLUTION INTRAVENOUS at 13:52

## 2021-12-31 NOTE — PROGRESS NOTES
0000 Bedside report given by Jane Pressley RN. Pt resting in bed, c/o pain 8/10. BG complete. VSS. Will give stadol/phenergen per orders. 0200 Pt sleeping comfortably in bed.    0400 Pt sleeping comfortably in bed.

## 2021-12-31 NOTE — PROGRESS NOTES
In to meet pt earlier tonight. She is undergoing IOL @ 37 wks for superimposed preE without SF. She is hurting/getting uncomfortable with nguyen and cytotec.     Baseline 130, mod variability, no decels  TOCO coupling 6 contractions in 10 minutes  Cx deferred    Cont current management   Stadol for pain control  No additional BP meds needed at this time, continue Procardia 90 mg XL and Labetalol 300 mg TID  Accuchecks more frequently in active labor

## 2021-12-31 NOTE — PROGRESS NOTES
1930 Bedside shift change report given to Moris Torres RN (oncoming nurse) by Nick Rosario RN (offgoing nurse). Report included the following information SBAR, Procedure Summary, MAR and Recent Results. 0000 Bedside shift change report given to Karen Love RN (oncoming nurse) by Danielito Inman RN (offgoing nurse). Report included the following information SBAR, Intake/Output, MAR and Recent Results.

## 2021-12-31 NOTE — PROGRESS NOTES
Labor Progress Note    CNM assumed pt care. Pt groggy. Patient seen, fetal heart rate and contraction pattern evaluated. Physical Exam:  Cervical Exam: not examined  Membranes:  Intact  Uterine Activity: Frequency: Irregular  Fetal Heart Rate: 145, moderate variability, accels present, no decels    Assessment/Plan:  Reassuring fetal status. Continue to uptitrate pitocin.          Santos Mason CNM

## 2021-12-31 NOTE — PROGRESS NOTES
1630 Bedside and Verbal shift change report given to Gina Mendez RN (oncoming nurse) by Christiano Castelan RN (offgoing nurse). Report included the following information SBAR, Procedure Summary, Accordion and Recent Results. Cervical check in office per Richard Rater on 12/29 0/80/-2, Vertex. Induction of labor for GHTN & GDM continues - Pitocin administration. 0800 Pt sleeping. [address] CNM on call for [address] at bedside to discuss POC. Viewed EFM strip. Pt up to BR for AM care. 0930 Finger stick blood sugar 108. Served regular diet. [facility] in to see pt as requested to discuss POC. Finger stick blood sugar 107.    1315 Transfer of care to [facility] after SBAR.    [address] [place] resumed care of pt.     1555 Bedside and Verbal shift change report given to Kishore Naik RN (oncoming nurse) by Gina Mendez RN  (offgoing nurse). Report included the following information SBAR, MAR, Accordion and Recent Results.

## 2022-01-01 ENCOUNTER — ANESTHESIA EVENT (OUTPATIENT)
Dept: LABOR AND DELIVERY | Age: 37
End: 2022-01-01
Payer: COMMERCIAL

## 2022-01-01 ENCOUNTER — ANESTHESIA (OUTPATIENT)
Dept: LABOR AND DELIVERY | Age: 37
End: 2022-01-01
Payer: COMMERCIAL

## 2022-01-01 LAB
GLUCOSE BLD STRIP.AUTO-MCNC: 104 MG/DL (ref 65–117)
GLUCOSE BLD STRIP.AUTO-MCNC: 112 MG/DL (ref 65–117)
GLUCOSE BLD STRIP.AUTO-MCNC: 124 MG/DL (ref 65–117)
GLUCOSE BLD STRIP.AUTO-MCNC: 84 MG/DL (ref 65–117)
GLUCOSE BLD STRIP.AUTO-MCNC: 96 MG/DL (ref 65–117)
SERVICE CMNT-IMP: ABNORMAL
SERVICE CMNT-IMP: NORMAL

## 2022-01-01 PROCEDURE — 65410000002 HC RM PRIVATE OB

## 2022-01-01 PROCEDURE — 76060000078 HC EPIDURAL ANESTHESIA

## 2022-01-01 PROCEDURE — 74011000250 HC RX REV CODE- 250: Performed by: STUDENT IN AN ORGANIZED HEALTH CARE EDUCATION/TRAINING PROGRAM

## 2022-01-01 PROCEDURE — 74011250636 HC RX REV CODE- 250/636: Performed by: OBSTETRICS & GYNECOLOGY

## 2022-01-01 PROCEDURE — 75410000000 HC DELIVERY VAGINAL/SINGLE

## 2022-01-01 PROCEDURE — 75410000002 HC LABOR FEE PER 1 HR

## 2022-01-01 PROCEDURE — 00HU33Z INSERTION OF INFUSION DEVICE INTO SPINAL CANAL, PERCUTANEOUS APPROACH: ICD-10-PCS | Performed by: STUDENT IN AN ORGANIZED HEALTH CARE EDUCATION/TRAINING PROGRAM

## 2022-01-01 PROCEDURE — 10907ZC DRAINAGE OF AMNIOTIC FLUID, THERAPEUTIC FROM PRODUCTS OF CONCEPTION, VIA NATURAL OR ARTIFICIAL OPENING: ICD-10-PCS | Performed by: OBSTETRICS & GYNECOLOGY

## 2022-01-01 PROCEDURE — 75410000003 HC RECOV DEL/VAG/CSECN EA 0.5 HR

## 2022-01-01 PROCEDURE — 74011250637 HC RX REV CODE- 250/637: Performed by: OBSTETRICS & GYNECOLOGY

## 2022-01-01 PROCEDURE — 82962 GLUCOSE BLOOD TEST: CPT

## 2022-01-01 PROCEDURE — 3E0R3BZ INTRODUCTION OF ANESTHETIC AGENT INTO SPINAL CANAL, PERCUTANEOUS APPROACH: ICD-10-PCS | Performed by: STUDENT IN AN ORGANIZED HEALTH CARE EDUCATION/TRAINING PROGRAM

## 2022-01-01 PROCEDURE — 77030014125 HC TY EPDRL BBMI -B: Performed by: STUDENT IN AN ORGANIZED HEALTH CARE EDUCATION/TRAINING PROGRAM

## 2022-01-01 PROCEDURE — 88307 TISSUE EXAM BY PATHOLOGIST: CPT

## 2022-01-01 RX ORDER — EPHEDRINE SULFATE/0.9% NACL/PF 50 MG/5 ML
50 SYRINGE (ML) INTRAVENOUS
Status: DISCONTINUED | OUTPATIENT
Start: 2022-01-01 | End: 2022-01-01 | Stop reason: HOSPADM

## 2022-01-01 RX ORDER — EPHEDRINE SULFATE/0.9% NACL/PF 50 MG/5 ML
SYRINGE (ML) INTRAVENOUS
Status: DISPENSED
Start: 2022-01-01 | End: 2022-01-01

## 2022-01-01 RX ORDER — BUPIVACAINE HYDROCHLORIDE 2.5 MG/ML
INJECTION, SOLUTION EPIDURAL; INFILTRATION; INTRACAUDAL
Status: COMPLETED
Start: 2022-01-01 | End: 2022-01-01

## 2022-01-01 RX ORDER — DIPHENHYDRAMINE HYDROCHLORIDE 50 MG/ML
25 INJECTION, SOLUTION INTRAMUSCULAR; INTRAVENOUS
Status: DISCONTINUED | OUTPATIENT
Start: 2022-01-01 | End: 2022-01-01 | Stop reason: HOSPADM

## 2022-01-01 RX ORDER — IBUPROFEN 400 MG/1
800 TABLET ORAL EVERY 8 HOURS
Status: DISCONTINUED | OUTPATIENT
Start: 2022-01-01 | End: 2022-01-01

## 2022-01-01 RX ORDER — IBUPROFEN 400 MG/1
800 TABLET ORAL EVERY 8 HOURS
Status: DISCONTINUED | OUTPATIENT
Start: 2022-01-01 | End: 2022-01-03 | Stop reason: HOSPADM

## 2022-01-01 RX ORDER — LIDOCAINE HYDROCHLORIDE 10 MG/ML
INJECTION INFILTRATION; PERINEURAL
Status: DISPENSED
Start: 2022-01-01 | End: 2022-01-01

## 2022-01-01 RX ORDER — SODIUM CHLORIDE, SODIUM LACTATE, POTASSIUM CHLORIDE, CALCIUM CHLORIDE 600; 310; 30; 20 MG/100ML; MG/100ML; MG/100ML; MG/100ML
125 INJECTION, SOLUTION INTRAVENOUS CONTINUOUS
Status: DISCONTINUED | OUTPATIENT
Start: 2022-01-01 | End: 2022-01-01 | Stop reason: HOSPADM

## 2022-01-01 RX ORDER — FENTANYL/BUPIVACAINE/NS/PF 2-1250MCG
1-16 PREFILLED PUMP RESERVOIR EPIDURAL CONTINUOUS
Status: DISCONTINUED | OUTPATIENT
Start: 2022-01-01 | End: 2022-01-01 | Stop reason: HOSPADM

## 2022-01-01 RX ORDER — BUPIVACAINE HYDROCHLORIDE 2.5 MG/ML
INJECTION, SOLUTION EPIDURAL; INFILTRATION; INTRACAUDAL
Status: COMPLETED | OUTPATIENT
Start: 2022-01-01 | End: 2022-01-01

## 2022-01-01 RX ORDER — NALOXONE HYDROCHLORIDE 0.4 MG/ML
0.4 INJECTION, SOLUTION INTRAMUSCULAR; INTRAVENOUS; SUBCUTANEOUS AS NEEDED
Status: DISCONTINUED | OUTPATIENT
Start: 2022-01-01 | End: 2022-01-01 | Stop reason: HOSPADM

## 2022-01-01 RX ADMIN — LABETALOL HYDROCHLORIDE 300 MG: 200 TABLET, FILM COATED ORAL at 14:34

## 2022-01-01 RX ADMIN — Medication 10 ML/HR: at 10:18

## 2022-01-01 RX ADMIN — BUTORPHANOL TARTRATE 1 MG: 1 INJECTION, SOLUTION INTRAMUSCULAR; INTRAVENOUS at 01:34

## 2022-01-01 RX ADMIN — OXYTOCIN 87.3 MILLI-UNITS/MIN: 10 INJECTION INTRAVENOUS at 17:23

## 2022-01-01 RX ADMIN — NIFEDIPINE 90 MG: 30 TABLET, FILM COATED, EXTENDED RELEASE ORAL at 09:06

## 2022-01-01 RX ADMIN — OXYTOCIN 1 MILLI-UNITS/MIN: 10 INJECTION INTRAVENOUS at 01:37

## 2022-01-01 RX ADMIN — SODIUM CHLORIDE, POTASSIUM CHLORIDE, SODIUM LACTATE AND CALCIUM CHLORIDE 999 ML/HR: 600; 310; 30; 20 INJECTION, SOLUTION INTRAVENOUS at 10:00

## 2022-01-01 RX ADMIN — BUPIVACAINE HYDROCHLORIDE 10 ML: 2.5 INJECTION, SOLUTION EPIDURAL; INFILTRATION; INTRACAUDAL; PERINEURAL at 09:40

## 2022-01-01 RX ADMIN — SODIUM CHLORIDE, POTASSIUM CHLORIDE, SODIUM LACTATE AND CALCIUM CHLORIDE 125 ML/HR: 600; 310; 30; 20 INJECTION, SOLUTION INTRAVENOUS at 01:31

## 2022-01-01 RX ADMIN — LABETALOL HYDROCHLORIDE 300 MG: 200 TABLET, FILM COATED ORAL at 06:58

## 2022-01-01 RX ADMIN — IBUPROFEN 800 MG: 400 TABLET, FILM COATED ORAL at 18:41

## 2022-01-01 RX ADMIN — SODIUM CHLORIDE, POTASSIUM CHLORIDE, SODIUM LACTATE AND CALCIUM CHLORIDE 125 ML/HR: 600; 310; 30; 20 INJECTION, SOLUTION INTRAVENOUS at 07:10

## 2022-01-01 RX ADMIN — Medication 25 MG: at 01:34

## 2022-01-01 RX ADMIN — OXYTOCIN 10000 MILLI-UNITS: 10 INJECTION INTRAVENOUS at 16:39

## 2022-01-01 NOTE — PROGRESS NOTES
Problem: Vaginal Delivery: Day of Deliver-Laboring  Goal: Activity/Safety  Outcome: Progressing Towards Goal  Goal: Nutrition/Diet  Outcome: Progressing Towards Goal  Goal: Medications  Outcome: Progressing Towards Goal  Goal: Treatments/Interventions/Procedures  Outcome: Progressing Towards Goal  Goal: *Vital signs within defined limits  Outcome: Progressing Towards Goal  Goal: *Labs within defined limits  Outcome: Progressing Towards Goal  Goal: *Hemodynamically stable  Outcome: Progressing Towards Goal  Goal: *Optimal pain control at patient's stated goal  Outcome: Progressing Towards Goal

## 2022-01-01 NOTE — ANESTHESIA PROCEDURE NOTES
Epidural Block    Patient location during procedure: OB  Start time: 1/1/2022 9:40 AM  End time: 1/1/2022 9:50 AM  Reason for block: labor epidural  Staffing  Performed: attending   Anesthesiologist: Naheed Hagan DO  Preanesthetic Checklist  Completed: patient identified, IV checked, site marked, risks and benefits discussed, surgical consent, monitors and equipment checked, pre-op evaluation and timeout performed  Block Placement  Patient position: sitting  Prep: DuraPrep  Sterility prep: mask, gloves, cap and drape  Sedation level: no sedation  Patient monitoring: heart rate, frequent blood pressure checks and continuous pulse oximetry  Approach: midline  Location: lumbar  Lumbar location: L3-L4  Epidural  Loss of resistance technique: air  Guidance: landmark technique  Needle  Needle type: Tuohy   Needle gauge: 17 G  Needle length: 9 cm  Needle insertion depth: 5 cm  Catheter type: stylet  Catheter size: 19 G  Catheter at skin depth: 10 cm  Catheter securement method: clear occlusive dressing, liquid medical adhesive and surgical tape  Test dose: negative  Medications Administered  Bupivacaine (PF) (MARCAINE) 0.25% Epidural, 10 mL  Assessment  Sensory level: T6  Block outcome: pain improved  Number of attempts: 1  Procedure assessment: patient tolerated procedure well with no immediate complications

## 2022-01-01 NOTE — L&D DELIVERY NOTE
Delivery Note    Obstetrician:  Rolando Gomes MD    Pre-Delivery Diagnosis: Term pregnancy, Induced labor and Pregnancy complicated by: pre-e without severe features, GDMA1    Post-Delivery Diagnosis: Living  infant(s) and Male    Intrapartum Event: None    Procedure: Spontaneous vaginal delivery    Nora Cannon did a great job. Pushed for just under an hour. Baby delivered in OA presentation. Shoulders and body easily followed. Baby placed on mom's chest. Sex was a surprise and dad announced - it was a boy (\"Familia\")! Dad was really happy and clapped excitedly with dolly. Cord clamped and cut by dad after a minute or so. Baby taken to warmer because he was having a little trouble transitioning. He was monitored for a bit and brought back to mom. Placenta delivered spontaneously and intact. No lacerations. Mom and baby skin to skin.       Epidural: YES    Estimated Blood Loss: 300 cc    Episiotomy: none    Laceration(s):  none    Laceration(s) repair: NO    Presentation: Cephalic    Fetal Description: torres    Fetal Position: Occiput Anterior    Birth Weight: pending    Birth Length: pending    Apgar - One Minute: 8    Apgar - Five Minutes: 9    Umbilical Cord: Nuchal Cord x  2, 3 vessels present and Cord blood sent to lab for type, Rh, and Elida' test  Specimens: placenta  Complications:  none           Cord Blood Results:   Information for the patient's :  Go Grace [[id number]]   No results found for: PCTABR, PCTDIG, BILI, ABORH     Prenatal Labs:     Lab Results   Component Value Date/Time    HBsAg, External nonreactive 2021 12:00 AM    HIV, External nonreactive 2021 12:00 AM    Rubella, External immune 2021 12:00 AM    Gonorrhea, External negative 2021 12:00 AM    Chlamydia, External negative 2021 12:00 AM    GrBStrep, External Negative 2021 12:00 AM         Delivery Summary    Patient: Kashif Segura MRN: [id number]  SSN: [id number]    Date of Birth: 1985  Age: 31 y.o. Sex: female       Information for the patient's :  Savanna Garrett [[id number]]       Labor Events:    Labor: No    Steroids: None   Cervical Ripening Date/Time:       Cervical Ripening Type: [place]/EASI   Antibiotics During Labor: No   Rupture Identifier:      Rupture Date/Time: 2022 5:18 AM   Rupture Type: AROM   Amniotic Fluid Volume:      Amniotic Fluid Description: Meconium    Amniotic Fluid Odor: None    Induction: [place] Bulb (balloon)       Induction Date/Time:        Indications for Induction: Gestational Hypertension;Diabetes    Augmentation: AROM   Augmentation Date/Time:      Indications for Augmentation: Ineffective Contraction Pattern   Labor complications: Additional complications:        Delivery Events:  Indications For Episiotomy:     Episiotomy: None   Perineal Laceration(s): None   Repaired:     Periurethral Laceration Location:      Repaired:     Labial Laceration Location:     Repaired:     Sulcal Laceration Location:     Repaired:     Vaginal Laceration Location:     Repaired:     Cervical Laceration Location:     Repaired:     Repair Suture: None   Number of Repair Packets:     Estimated Blood Loss (ml):  ml   Quantitative Blood Loss (ml)                Delivery Date: 2022    Delivery Time: 4:30 PM  Delivery Type:    Sex:  Male    Gestational Age: 42w2d   Delivery Clinician:  Lesa Felty Rankins  Living Status: Living   Delivery Location: L&D            APGARS  One minute Five minutes Ten minutes   Skin color: 0   1        Heart rate: 2   2        Grimace: 2   2        Muscle tone: 2   2        Breathin   2        Totals: 8   9            Presentation:      Position:        Resuscitation Method:  Oxygen;Suctioning-bulb;Suctioning-deep     Meconium Stained: Thick      Cord Information:    Complications:    Cord around:    Delayed cord clamping?     Cord clamped date/time:   Disposition of Cord Blood:      Blood Gases Sent?:      Placenta:  Date/Time: 2022  4:35 PM  Removal: Spontaneous      Appearance: Normal      Measurements:  Birth Weight:        Birth Length:        Head Circumference:        Chest Circumference:       Abdominal Girth: Other Providers:   Luke AVILA;SUSAN TRIPLETT;JASMIN ROWLEY, Obstetrician;Primary Nurse;Primary  Nurse;Nicu Nurse;Neonatologist;Anesthesiologist;Crna;Nurse Practitioner;Midwife;Nursery Nurse           Group B Strep:   Lab Results   Component Value Date/Time    GrBStrep, External Negative 2021 12:00 AM     Information for the patient's :  Srinivasan Monas [[id number]]   No results found for: ABORH, PCTABR, PCTDIG, BILI, ABORHEXT, ABORH     No results for input(s): PCO2CB, PO2CB, HCO3I, SO2I, IBD, PTEMPI, SPECTI, PHICB, ISITE, IDEV, IALLEN in the last 72 hours.

## 2022-01-01 NOTE — PROGRESS NOTES
Labor Progress Note  Patient seen, fetal heart rate and contraction pattern evaluated, patient examined. I was called by nurse regarding patient and  feeling frustrated. Patient Vitals for the past 1 hrs:   BP Temp Pulse Resp SpO2   22 1435 (!) 140/85 99 °F (37.2 °C) 80 18    22 1433     97 %       Physical Exam:  Cervical Exam:  10/100/+3  Membranes:  AROM this am  Uterine Activity: q2-4 min  Fetal Heart Rate: Baseline: 150 per minute  Variability: moderate  Accelerations: yes  Decelerations: variable    Assessment/Plan:  Prior to me checking patient she and  had expressed some frustration at progress, the length of time of induction, wanting things to be as natural as possible, worried about , not understanding why if she needed to be induced it was ok for it to be several days,  thinking  is reasonable. I answered all questions and then did exam. They feel better about things now. Will start pushing once things are set up.

## 2022-01-01 NOTE — PROGRESS NOTES
Labor Progress Note  Patient seen, fetal heart rate and contraction pattern evaluated. Physical Exam:  Cervical Exam: 4/75/-3  Membranes:  Intact  Uterine Activity: Frequency: regular  Fetal Heart Rate: Reactive  Accelerations: yes  Decelerations: no  Variability- modertae  Uterine contractions: regular    Assessment/Plan:head unengaged and floating, unable to safely AROM at this time, will give pitocin break then restart.     Leo Coyle MD

## 2022-01-01 NOTE — PROGRESS NOTES
High Risk Obstetrics Progress Note    Name: Kashif Segura MRN: [id number]  SSN: [id number]    YOB: 1985  Age: 31 y.o. Sex: female      Subjective:      LOS: 2 days    Estimated Date of Delivery: 22   Gestational Age Today: 42w2d   Ms. Hayes is admitted with pregnancy at 37w0d for induction of labor due to preeclampsia without SF. Prenatal course is complicated by:  1. CHTN: diagnosed at 16 wks gestation and was on procardia 90 XL daily     2. SI preE w/o SF: Patient was admitted - due to exacerbation of BPs and need for additional anithypertensives (labetalol 200 mg bid was added to her regimen at that time) so was thought to have preE with severe features. Her urine p/c also increased from a baseline of .44 to .7. She completed  corticosteroids on . After 3 weeks of stable BPs/labs and no symptoms of SF her diagnosis was reassessed by MFM and patient's diagnosis was reclassified as preE without SF. She was discharged home with close outpatient surveillance. -BPs have been normal to mild range on procardia 90 xl daily along with labetalol 300 mg tid     3. GDMA1  -Well controlled with diet alone      Patient is feeling well. She denies HA, visual changes, RUQ pain, ctx, vb, lof. +FM.      Patient admitted for chronic hypertension, diabetes - gestational and preeclampsia. States she does not have abdominal pain  , chest pain, contractions, fever, headache , nausea and vomiting, pelvic pressure, right upper quadrant pain  , shortness of breath, swelling, vaginal bleeding , vaginal leaking of fluid  and visual disturbances. Objective:     Vitals:  Blood pressure 135/79, pulse 78, temperature 98.4 °F (36.9 °C), resp. rate 14, height 5' 1\" (1.549 m), weight 81.2 kg (179 lb), SpO2 100 %, not currently breastfeeding.    Temp (24hrs), Av.8 °F (37.1 °C), Min:98.4 °F (36.9 °C), Max:99.2 °F (40.4 °C)    Systolic (01YHK), EYZ:664 , Min:134 , YDQ:271      Diastolic (24hrs), Av, Min:79, Max:97       Intake and Output:         Physical Exam:  Patient without distress. Abdomen: soft, nontender  Fundus: soft and non tender  Cervical Exam: 5 cm dilated    90% effaced    -2 station    Presenting Part: cephalic  Cervical Position: mid position  Consistency: Soft  Lower Extremities:  - Edema 1+   - No evidence of DVT seen on physical exam.  Negative Graeme's sign. No cords or calf tenderness. No significant calf/ankle edema.   - Patellar Reflexes: 1+ bilaterally   - Clonus: absent       Membranes:  Artificial Rupture of Membranes;  Amniotic Fluid: large amount of clear fluid    Uterine Activity:  regular    Fetal Heart Rate:  Baseline: 130 per minute  Variability: moderate  Accelerations: yes  Decelerations: none  Uterine contractions: regular, every 3-5 minutes        Labs:   Recent Results (from the past 36 hour(s))   COVID-19 RAPID TEST    Collection Time: 21  6:53 PM   Result Value Ref Range    Specimen source Nasopharyngeal      COVID-19 rapid test Not detected NOTD     GLUCOSE, POC    Collection Time: 21  9:22 PM   Result Value Ref Range    Glucose (POC) 111 65 - 117 mg/dL    Performed by [address] [place] [address], POC    Collection Time: 21 12:18 AM   Result Value Ref Range    Glucose (POC) 127 (H) 65 - 117 mg/dL    Performed by [address] [place] [address], POC    Collection Time: 21  4:15 AM   Result Value Ref Range    Glucose (POC) 97 65 - 117 mg/dL    Performed by [address] [place] [address], POC    Collection Time: 21  9:37 AM   Result Value Ref Range    Glucose (POC) 108 65 - 117 mg/dL    Performed by [address] [place] [address], POC    Collection Time: 21  1:00 PM   Result Value Ref Range    Glucose (POC) 107 65 - 117 mg/dL    Performed by [address] [place] [address], POC    Collection Time: 21  5:00 PM   Result Value Ref Range    Glucose (POC) 100 65 - 117 mg/dL    Performed by Sofía Curiel    GLUCOSE, POC Collection Time: 21  9:03 PM   Result Value Ref Range    Glucose (POC) 116 65 - 117 mg/dL    Performed by Jerry Curiel    GLUCOSE, POC    Collection Time: 22 12:57 AM   Result Value Ref Range    Glucose (POC) 112 65 - 117 mg/dL    Performed by Forestine Meigs and Plan: Active Problems:    Preeclampsia (2021)      37 weeks gestation of pregnancy (2021)     Ms. Agatha Burt is admitted with pregnancy at 37w0d for induction of labor due to preeclampsia without SF. Prenatal course is complicated by:  1. CHTN: diagnosed at 16 wks gestation and was on procardia 90 XL daily     2. SI preE w/o SF: Patient was admitted - due to exacerbation of BPs and need for additional anithypertensives (labetalol 200 mg bid was added to her regimen at that time) so was thought to have preE with severe features. Her urine p/c also increased from a baseline of .44 to .7. She completed  corticosteroids on . After 3 weeks of stable BPs/labs and no symptoms of SF her diagnosis was reassessed by M and patient's diagnosis was reclassified as preE without SF. She was discharged home with close outpatient surveillance. -BPs have been normal to mild range on procardia 90 xl daily along with labetalol 300 mg tid     3. GDMA1  -Well controlled with diet alone      Patient is feeling well. She denies HA, visual changes, RUQ pain, ctx, vb, lof. +FM.       Progressing now in Labor to 5cm    Signed By: Vita Maya MD     2022

## 2022-01-01 NOTE — PROGRESS NOTES
5916 Bedside and Verbal shift change report given to Felix Rodriguez RN (oncoming nurse) by Wing Anaya RN (offgoing nurse). Report included the following information SBAR, Procedure Summary, MAR and Accordion. 0745 Pt requested nitrous oxide. Consent completed & administration started. 0800 Pt declines further use of nitrous, not effective per report. Pt continues to have nasal congestion & \"stuffiness\". Declines need for a humidifier and refuses saline spray. 0915 Finger stick blood sugar 104. Pt desires epidural, bolus started and questions answered. 0389 Anesthesia consent completed by pt/Pillo EVANS.    9118 Epidural by Minesh Carrillo MD.    1 Donal EVANS on call for [facility] at bedside to discuss POC. Viewed EF strip. SVE 5/100/-2    1315 Finger stick blood sugar 126.    1346 Bilateral side lying release f/b peanut & TR.    1430 Throne position. Pt and her partner expressed feelings of frustration with the lack of progress and length of process. She questions why she had to be induced. Discussed diagnosis with both and what brought pt's physicians to the point of recommending interventions (labs, bps, & diabetes). Pt voices understand but continues not to be reassured that it was the best option. 5 Donal EVANS at bedside per nurse's request to discuss pt's and her partners concerns. Questions and concerned addressed. SVE 10/100/+3    1535 Bedside and Verbal shift change report given to Anne Hampton RN (oncoming nurse) by Felix Rodriguez RN (offgoing nurse). Report included the following information SBAR, Procedure Summary, Intake/Output, MAR and Accordion.

## 2022-01-01 NOTE — PROGRESS NOTES
2330:  Bedside and Verbal shift change report given to Celeste Roland RN (oncoming nurse) by Kwesi Cohen RN (offgoing nurse). Report included the following information SBAR, Kardex, Intake/Output, MAR and Recent Results. 0005:  Pt off EFM to shower. OK per Dr Elysa Koyanagi  0518:  Dr Elysa Koyanagi at bedside. SVE 5/90/-2 AROM for moderate amount of meconium stained fluid  0620:  Pt requesting to change BP medications because she has nasal congestion. OK to hold 0700 dose of labetalol per Dr Giuseppe Guardado. RN offered saline nasal spray, pt declined. 4L O2 with sterile water applied with nasal canula to help with congestion. 0700:  Knee chest  0735: Bedside and Verbal shift change report given to [address] [place] [address] (oncoming nurse) by Celeste Roland RN (offgoing nurse). Report included the following information SBAR, Kardex, Intake/Output, MAR and Recent Results.

## 2022-01-01 NOTE — PROGRESS NOTES
Labor Progress Note  Patient seen, fetal heart rate and contraction pattern evaluated, patient examined. She just got epidural and was comfortable. Patient Vitals for the past 1 hrs:   BP Pulse SpO2   22 1016 (!) 140/84 73    22 1004 (!) 149/81 71    22 0959 (!) 142/80 78    22 0952 (!) 149/87 85    22 0951 (!) 168/91 83 99 %       Physical Exam:  Cervical Exam:  5/100/-2  Membranes:  AROM this morning  Uterine Activity: q6-7 minutes  Fetal Heart Rate: Baseline: 145 per minute  Variability: moderate  Accelerations: yes  Decelerations: none    Assessment/Plan:   @ 37w2d being induced for GHTN. Now s/p AROM. Hopefully transitioning into active labor. Continue to titrate pitocin to effect. Recheck in 4-6 hours or PRN.

## 2022-01-01 NOTE — ANESTHESIA PREPROCEDURE EVALUATION
Relevant Problems   CARDIOVASCULAR   (+) Chronic hypertension affecting pregnancy   (+) Chronic hypertension with superimposed pre-eclampsia   (+) Hypertension affecting pregnancy   (+) Preeclampsia      ENDOCRINE   (+) Gestational diabetes       Anesthetic History   No history of anesthetic complications            Review of Systems / Medical History  Patient summary reviewed, nursing notes reviewed and pertinent labs reviewed    Pulmonary  Within defined limits                 Neuro/Psych         Headaches     Cardiovascular  Within defined limits                     GI/Hepatic/Renal  Within defined limits              Endo/Other  Within defined limits           Other Findings   Comments: gestational HTN and gestation diabetes           Physical Exam    Airway  Mallampati: II  TM Distance: 4 - 6 cm  Neck ROM: normal range of motion   Mouth opening: Normal     Cardiovascular    Rhythm: regular  Rate: normal         Dental  No notable dental hx       Pulmonary  Breath sounds clear to auscultation               Abdominal  GI exam deferred       Other Findings            Anesthetic Plan    ASA: 3  Anesthesia type: epidural          Induction: Intravenous  Anesthetic plan and risks discussed with: Patient

## 2022-01-01 NOTE — PROGRESS NOTES
Problem: Patient Education: Go to Patient Education Activity  Goal: Patient/Family Education  Outcome: Progressing Towards Goal     Problem: Vaginal Delivery: Day of Deliver-Laboring  Goal: Off Pathway (Use only if patient is Off Pathway)  Outcome: Progressing Towards Goal  Goal: Activity/Safety  Outcome: Progressing Towards Goal  Goal: Consults, if ordered  Outcome: Progressing Towards Goal  Goal: Diagnostic Test/Procedures  Outcome: Progressing Towards Goal  Goal: Nutrition/Diet  Outcome: Progressing Towards Goal  Goal: Discharge Planning  Outcome: Progressing Towards Goal  Goal: Medications  Outcome: Progressing Towards Goal  Goal: Respiratory  Outcome: Progressing Towards Goal  Goal: Treatments/Interventions/Procedures  Outcome: Progressing Towards Goal  Goal: *Vital signs within defined limits  Outcome: Progressing Towards Goal  Goal: *Labs within defined limits  Outcome: Progressing Towards Goal  Goal: *Hemodynamically stable  Outcome: Progressing Towards Goal  Goal: *Optimal pain control at patient's stated goal  Outcome: Progressing Towards Goal     Problem: Vaginal Delivery: Day of Delivery-Post delivery  Goal: Off Pathway (Use only if patient is Off Pathway)  Outcome: Progressing Towards Goal  Goal: Activity/Safety  Outcome: Progressing Towards Goal  Goal: Consults, if ordered  Outcome: Progressing Towards Goal  Goal: Nutrition/Diet  Outcome: Progressing Towards Goal  Goal: Discharge Planning  Outcome: Progressing Towards Goal  Goal: Medications  Outcome: Progressing Towards Goal  Goal: Treatments/Interventions/Procedures  Outcome: Progressing Towards Goal  Goal: *Vital signs within defined limits  Outcome: Progressing Towards Goal  Goal: *Labs within defined limits  Outcome: Progressing Towards Goal  Goal: *Hemodynamically stable  Outcome: Progressing Towards Goal  Goal: *Optimal pain control at patient's stated goal  Outcome: Progressing Towards Goal  Goal: *Participates in infant care  Outcome: Progressing Towards Goal  Goal: *Demonstrates progressive activity  Outcome: Progressing Towards Goal  Goal: *Tolerating diet  Outcome: Progressing Towards Goal     Problem: Vaginal Delivery: Day of Delivery-Post delivery  Goal: Off Pathway (Use only if patient is Off Pathway)  Outcome: Progressing Towards Goal  Goal: Activity/Safety  Outcome: Progressing Towards Goal  Goal: Consults, if ordered  Outcome: Progressing Towards Goal  Goal: Nutrition/Diet  Outcome: Progressing Towards Goal  Goal: Discharge Planning  Outcome: Progressing Towards Goal  Goal: Medications  Outcome: Progressing Towards Goal  Goal: Treatments/Interventions/Procedures  Outcome: Progressing Towards Goal  Goal: *Vital signs within defined limits  Outcome: Progressing Towards Goal  Goal: *Labs within defined limits  Outcome: Progressing Towards Goal  Goal: *Hemodynamically stable  Outcome: Progressing Towards Goal  Goal: *Optimal pain control at patient's stated goal  Outcome: Progressing Towards Goal  Goal: *Participates in infant care  Outcome: Progressing Towards Goal  Goal: *Demonstrates progressive activity  Outcome: Progressing Towards Goal  Goal: *Tolerating diet  Outcome: Progressing Towards Goal     Problem: Vaginal Delivery: Postpartum Day 1  Goal: Off Pathway (Use only if patient is Off Pathway)  Outcome: Progressing Towards Goal  Goal: Activity/Safety  Outcome: Progressing Towards Goal  Goal: Consults, if ordered  Outcome: Progressing Towards Goal  Goal: Diagnostic Test/Procedures  Outcome: Progressing Towards Goal  Goal: Nutrition/Diet  Outcome: Progressing Towards Goal  Goal: Discharge Planning  Outcome: Progressing Towards Goal  Goal: Medications  Outcome: Progressing Towards Goal  Goal: Treatments/Interventions/Procedures  Outcome: Progressing Towards Goal  Goal: Psychosocial  Outcome: Progressing Towards Goal  Goal: *Vital signs within defined limits  Outcome: Progressing Towards Goal  Goal: *Labs within defined limits  Outcome: Progressing Towards Goal  Goal: *Hemodynamically stable  Outcome: Progressing Towards Goal  Goal: *Optimal pain control at patient's stated goal  Outcome: Progressing Towards Goal  Goal: *Participates in infant care  Outcome: Progressing Towards Goal  Goal: *Demonstrates progressive activity  Outcome: Progressing Towards Goal  Goal: *Performs self perineal care  Outcome: Progressing Towards Goal  Goal: *Appropriate parent-infant bonding  Outcome: Progressing Towards Goal  Goal: *Tolerating diet  Outcome: Progressing Towards Goal  Goal: *Performs self breast care  Outcome: Progressing Towards Goal     Problem: Vaginal Delivery: Postpartum 2  Goal: Off Pathway (Use only if patient is Off Pathway)  Outcome: Progressing Towards Goal  Goal: Activity/Safety  Outcome: Progressing Towards Goal  Goal: Consults, if ordered  Outcome: Progressing Towards Goal  Goal: Nutrition/Diet  Outcome: Progressing Towards Goal  Goal: Discharge Planning  Outcome: Progressing Towards Goal  Goal: Medications  Outcome: Progressing Towards Goal  Goal: Treatments/Interventions/Procedures  Outcome: Progressing Towards Goal  Goal: Psychosocial  Outcome: Progressing Towards Goal     Problem: Vaginal Delivery: Discharge Outcomes  Goal: *Verbalizes name, dosage, time, side effects, and number of days to continue medications  Outcome: Progressing Towards Goal  Goal: *Describes available resources and support systems  Outcome: Progressing Towards Goal  Goal: *No signs and symptoms of infection  Outcome: Progressing Towards Goal  Goal: *Birth certificate information completed  Outcome: Progressing Towards Goal  Goal: *Received and verbalizes understanding of discharge plan and instructions  Outcome: Progressing Towards Goal  Goal: *Vital signs within defined limits  Outcome: Progressing Towards Goal  Goal: *Labs within defined limits  Outcome: Progressing Towards Goal  Goal: *Hemodynamically stable  Outcome: Progressing Towards Goal  Goal: *Optimal pain control at patient's stated goal  Outcome: Progressing Towards Goal  Goal: *Participates in infant care  Outcome: Progressing Towards Goal  Goal: *Demonstrates progressive activity  Outcome: Progressing Towards Goal  Goal: *Appropriate parent-infant bonding  Outcome: Progressing Towards Goal  Goal: *Tolerating diet  Outcome: Progressing Towards Goal     Problem: Diabetes in Pregnancy/Gestational Diabetes  Goal: *Blood glucose within specified parameters  Outcome: Progressing Towards Goal  Goal: *Reassuring fetal surveillance  Outcome: Progressing Towards Goal     Problem: Patient Education: Go to Patient Education Activity  Goal: Patient/Family Education  Outcome: Progressing Towards Goal

## 2022-01-01 NOTE — PROGRESS NOTES
1542 - Bedside and Verbal shift change report given to RHONA Juares RNC (oncoming nurse) by TEAGAN Rockwell RN (offgoing nurse). Report included the following information SBAR, Kardex, Procedure Summary, Intake/Output, MAR, Accordion, Recent Results and Med Rec Status. 1600 - RN at bedside, continuously monitoring FHR tracing  1630 - RN at bedside, continuously monitoring FHR tracing    1630 -  of live male infant, delivered by Dr Carolyn Godwin. Yuly Staggtheron Godwin states \"hold the Procardia and Labetalol,\" since postpartum BPs have been within normal range.  - TRANSFER - OUT REPORT:    Verbal report given to GENNA Mcfarlane) RN(name) on [facility]  being transferred to [facility] Rm 332(unit) for routine progression of care       Report consisted of patients Situation, Background, Assessment and   Recommendations(SBAR). Information from the following report(s) SBAR, Kardex, Procedure Summary, Intake/Output, MAR, Accordion, Recent Results and Med Rec Status was reviewed with the receiving nurse. Lines:   Peripheral IV 21 Anterior; Left Wrist (Active)   Site Assessment Clean, dry, & intact 21   Phlebitis Assessment 0 21   Infiltration Assessment 0 21   Dressing Status Clean, dry, & intact 21   Dressing Type Tape;Transparent 21   Hub Color/Line Status Pink 21        Opportunity for questions and clarification was provided. Patient transported with:   Registered Nurse via wheelchair, holding NB in arms. Mary Anne Torres RN assisted with bringing pt's belongings and bassinet. Pt's  Dasiy Dennis) is getting her dinner from [facility].

## 2022-01-02 LAB
GLUCOSE BLD STRIP.AUTO-MCNC: 89 MG/DL (ref 65–117)
SERVICE CMNT-IMP: NORMAL

## 2022-01-02 PROCEDURE — 74011250637 HC RX REV CODE- 250/637: Performed by: OBSTETRICS & GYNECOLOGY

## 2022-01-02 PROCEDURE — 65410000002 HC RM PRIVATE OB

## 2022-01-02 PROCEDURE — 82962 GLUCOSE BLOOD TEST: CPT

## 2022-01-02 RX ORDER — ONDANSETRON 4 MG/1
4 TABLET, ORALLY DISINTEGRATING ORAL
Status: DISCONTINUED | OUTPATIENT
Start: 2022-01-02 | End: 2022-01-03 | Stop reason: HOSPADM

## 2022-01-02 RX ORDER — HYDROCORTISONE 1 %
CREAM (GRAM) TOPICAL AS NEEDED
Status: DISCONTINUED | OUTPATIENT
Start: 2022-01-02 | End: 2022-01-03 | Stop reason: HOSPADM

## 2022-01-02 RX ORDER — SODIUM CHLORIDE 0.9 % (FLUSH) 0.9 %
5-40 SYRINGE (ML) INJECTION AS NEEDED
Status: DISCONTINUED | OUTPATIENT
Start: 2022-01-02 | End: 2022-01-03 | Stop reason: HOSPADM

## 2022-01-02 RX ORDER — IBUPROFEN 800 MG/1
800 TABLET ORAL
Qty: 40 TABLET | Refills: 1 | Status: SHIPPED | OUTPATIENT
Start: 2022-01-02

## 2022-01-02 RX ORDER — DOCUSATE SODIUM 100 MG/1
100 CAPSULE, LIQUID FILLED ORAL
Status: DISCONTINUED | OUTPATIENT
Start: 2022-01-02 | End: 2022-01-03 | Stop reason: HOSPADM

## 2022-01-02 RX ORDER — SODIUM CHLORIDE 0.9 % (FLUSH) 0.9 %
5-40 SYRINGE (ML) INJECTION EVERY 8 HOURS
Status: DISCONTINUED | OUTPATIENT
Start: 2022-01-02 | End: 2022-01-03 | Stop reason: HOSPADM

## 2022-01-02 RX ORDER — OXYTOCIN/RINGER'S LACTATE 30/500 ML
10 PLASTIC BAG, INJECTION (ML) INTRAVENOUS AS NEEDED
Status: DISCONTINUED | OUTPATIENT
Start: 2022-01-02 | End: 2022-01-03 | Stop reason: HOSPADM

## 2022-01-02 RX ORDER — ACETAMINOPHEN 325 MG/1
650 TABLET ORAL
Status: DISCONTINUED | OUTPATIENT
Start: 2022-01-02 | End: 2022-01-03 | Stop reason: HOSPADM

## 2022-01-02 RX ORDER — HYDROCORTISONE ACETATE PRAMOXINE HCL 2.5; 1 G/100G; G/100G
CREAM TOPICAL AS NEEDED
Status: DISCONTINUED | OUTPATIENT
Start: 2022-01-02 | End: 2022-01-03 | Stop reason: HOSPADM

## 2022-01-02 RX ORDER — LANOLIN ALCOHOL/MO/W.PET/CERES
3 CREAM (GRAM) TOPICAL
Status: DISCONTINUED | OUTPATIENT
Start: 2022-01-02 | End: 2022-01-03 | Stop reason: HOSPADM

## 2022-01-02 RX ORDER — SIMETHICONE 80 MG
80 TABLET,CHEWABLE ORAL
Status: DISCONTINUED | OUTPATIENT
Start: 2022-01-02 | End: 2022-01-03 | Stop reason: HOSPADM

## 2022-01-02 RX ORDER — DIPHENHYDRAMINE HCL 25 MG
25 CAPSULE ORAL
Status: DISCONTINUED | OUTPATIENT
Start: 2022-01-02 | End: 2022-01-03 | Stop reason: HOSPADM

## 2022-01-02 RX ORDER — OXYTOCIN/RINGER'S LACTATE 30/500 ML
87.3 PLASTIC BAG, INJECTION (ML) INTRAVENOUS AS NEEDED
Status: DISCONTINUED | OUTPATIENT
Start: 2022-01-02 | End: 2022-01-03 | Stop reason: HOSPADM

## 2022-01-02 RX ORDER — AMMONIA 15 % (W/V)
1 AMPUL (EA) INHALATION AS NEEDED
Status: DISCONTINUED | OUTPATIENT
Start: 2022-01-02 | End: 2022-01-03 | Stop reason: HOSPADM

## 2022-01-02 RX ORDER — LABETALOL 300 MG/1
300 TABLET, FILM COATED ORAL EVERY 8 HOURS
Qty: 90 TABLET | Refills: 1 | Status: SHIPPED
Start: 2022-01-02 | End: 2022-01-03

## 2022-01-02 RX ADMIN — IBUPROFEN 800 MG: 400 TABLET, FILM COATED ORAL at 07:09

## 2022-01-02 RX ADMIN — IBUPROFEN 800 MG: 400 TABLET, FILM COATED ORAL at 16:21

## 2022-01-02 NOTE — ANESTHESIA POSTPROCEDURE EVALUATION
* No procedures listed *.    epidural    Anesthesia Post Evaluation      Multimodal analgesia: multimodal analgesia used between 6 hours prior to anesthesia start to PACU discharge  Patient location during evaluation: bedside  Patient participation: complete - patient participated  Level of consciousness: awake  Pain score: 0  Pain management: satisfactory to patient  Airway patency: patent  Anesthetic complications: no  Cardiovascular status: acceptable and blood pressure returned to baseline  Respiratory status: acceptable  Hydration status: acceptable  Comments: I have evaluated the patient and meets criteria for discharge from PACU. Juan Jose George DO.   Post anesthesia nausea and vomiting:  none  Final Post Anesthesia Temperature Assessment:  Normothermia (36.0-37.5 degrees C)      INITIAL Post-op Vital signs:   Vitals Value Taken Time   /78 01/01/22 1952   Temp 37.3 °C (99.1 °F) 01/01/22 1952   Pulse 91 01/01/22 1952   Resp 16 01/01/22 1952   SpO2

## 2022-01-02 NOTE — PROGRESS NOTES
Post-Partum Day Number 1 Progress Note    Marylu Murcia     Assessment:   Doing well, post partum day 1  SI PreE w/out SF, BP stable on nifedipine XL 90mg daily and labetalol 300mg po tid, labs nml  GDMA1  Boy for circ today    Plan:  - Continue routine postpartum and perineal care as well as maternal education.  - The risks and benefits of the circumcision  procedure and anesthesia including: bleeding, infection, variability of cosmetic results were discussed at length with the mother. She is aware that future repeat procedures may be necessary. She gives informed consent to proceed as noted and her questions are answered. - Plan discharge home [facility] Discharge Date: Tomorrow. - AM d/c in    Information for the patient's :  Juan Carlos Frederick [[id number]]   Vaginal, Spontaneous    Patient doing well without significant complaint. Voiding without difficulty, normal lochia. Vitals:  Visit Vitals  /80   Pulse 90   Temp 98.2 °F (36.8 °C)   Resp 16   Ht 5' 1\" (1.549 m)   Wt 81.2 kg (179 lb)   SpO2 97%   Breastfeeding Unknown   BMI 33.82 kg/m²     Temp (24hrs), Av.9 °F (37.2 °C), Min:98.2 °F (36.8 °C), Max:99.3 °F (37.4 °C)        Exam:   Patient without distress. Fundus firm, nontender per nursing fundal checks. Perineum with normal lochia noted per nursing assessment. Lower extremities are negative for pathological edema.     Labs:     Lab Results   Component Value Date/Time    WBC 9.1 2021 04:59 PM    WBC 8.4 2021 04:05 PM    WBC 9.8 2021 06:10 AM    WBC 10.0 2021 03:31 AM    WBC 8.3 2021 10:52 AM    WBC 8.3 2021 06:02 AM    WBC 10.9 2021 11:17 PM    WBC 10.0 2021 11:39 AM    HGB 12.1 2021 04:59 PM    HGB 11.9 2021 04:05 PM    HGB 10.8 (L) 2021 06:10 AM    HGB 11.3 (L) 2021 03:31 AM    HGB 11.2 (L) 2021 10:52 AM    HGB 11.0 (L) 2021 06:02 AM    HGB 12.1 2021 11:17 PM    HGB 11.7 11/02/2021 11:39 AM    HCT 35.6 12/30/2021 04:59 PM    HCT 35.9 12/08/2021 04:05 PM    HCT 32.2 (L) 11/30/2021 06:10 AM    HCT 34.5 (L) 11/23/2021 03:31 AM    HCT 34.4 (L) 11/16/2021 10:52 AM    HCT 32.8 (L) 11/07/2021 06:02 AM    HCT 35.9 11/05/2021 11:17 PM    HCT 34.9 (L) 11/02/2021 11:39 AM    PLATELET 086 09/79/6879 04:59 PM    PLATELET 957 (H) 23/64/7808 04:05 PM    PLATELET 931 65/97/7012 06:10 AM    PLATELET 488 82/29/4411 03:31 AM    PLATELET 037 97/30/2469 10:52 AM    PLATELET 654 61/94/1301 06:02 AM    PLATELET 369 81/23/9128 11:17 PM    PLATELET 912 92/17/1227 11:39 AM       Recent Results (from the past 24 hour(s))   GLUCOSE, POC    Collection Time: 01/01/22  9:09 AM   Result Value Ref Range    Glucose (POC) 104 65 - 117 mg/dL    Performed by [address], POC    Collection Time: 01/01/22  1:19 PM   Result Value Ref Range    Glucose (POC) 124 (H) 65 - 117 mg/dL    Performed by [address], POC    Collection Time: 01/01/22  4:03 PM   Result Value Ref Range    Glucose (POC) 84 65 - 117 mg/dL    Performed by Faustina, POC    Collection Time: 01/02/22  8:06 AM   Result Value Ref Range    Glucose (POC) 89 65 - 117 mg/dL    Performed by Dana Hunt

## 2022-01-02 NOTE — LACTATION NOTE
This note was copied from a baby's chart. Infant born to mother with diet controlled GDM has had issues maintaining blood sugars. Infant has received glucose gel per protocol twice with breastfeeding , and then started giving formula when that was not sufficient to attain blood sugar over 45. Blood sugar of 46 was attained after formula. Mother encouraged to call for assistance with next nursing session. Mother encouraged to breastfeed then give formula if necessary to support blood sugar. Feeding plan discussed with father as well. We talked about expected outcomes, resolution of issue in the coming hours/day(s), and supporting milk supply while supplementing. Both parents expressed verbal understanding of plan and agreed to call for assistance for next session. One hour after note above was written, mother called for me to come back. I was in another room caring for a different mother. A few minutes later I came to the desk, nurse reported that mother was angry that I did not come to her room when she called and that she did not breastfeed, but gave formula instead. Afternoon follow up: Blood sugar 50, Infant more wakeful when shown techniques for waking, baby learned to latch and sustain latch during feeding, parents taught two positions, Infant alert and eager, after nursing, bottle of formula offered to sustain blood sugar,   Infant took 9 ml.

## 2022-01-02 NOTE — ROUTINE PROCESS
Bedside shift change report given to REBEL Higgins RN (oncoming nurse) [facility] JULIANNE (offgoing nurse). Report included the following information SBAR and Kardex.

## 2022-01-02 NOTE — DISCHARGE SUMMARY
Obstetrical Discharge Summary     Name: Vikki Selby MRN: [id number]  SSN: [id number]    YOB: 1985  Age: 31 y.o. Sex: female      Admit Date: 2021    Discharge Date: 1/3/2021     Admitting Physician: Mehdi Cruz MD     Attending Physician:  Joseph Adams MD     Admission Diagnoses: Preeclampsia [O14.90]  37 weeks gestation of pregnancy [Z3A.37]    Discharge Diagnoses:   Information for the patient's :  Yue Ramos [[id number]]   Delivery of a 2.82 kg male infant via Vaginal, Spontaneous on 2022 at 4:30 PM  by Shy Kulkarni. Apgars were 8  and 9 . Additional Diagnoses:   Hospital Problems  Date Reviewed: 2013          Codes Class Noted POA    Normal labor and delivery ICD-10-CM: O80  ICD-9-CM: 324  2022 Unknown        37 weeks gestation of pregnancy ICD-10-CM: Z3A.37  ICD-9-CM: V22.2  2021 Unknown        Preeclampsia ICD-10-CM: O14.90  ICD-9-CM: 642.40  2021 Unknown             Lab Results   Component Value Date/Time    Rubella, External immune 2021 12:00 AM    GrBStrep, External Negative 2021 12:00 AM       Hospital Course:  Pt was admitted for IOL for SI Pre eclampsia (also with GDMA1). She had an uncomplicated  on . She continued on her procardia and labetalol and had a normal hospital course following the delivery. Patient Instructions:   Current Discharge Medication List      START taking these medications    Details   ibuprofen (MOTRIN) 800 mg tablet Take 1 Tablet by mouth every eight (8) hours as needed for Pain. Qty: 40 Tablet, Refills: 1         CONTINUE these medications which have CHANGED    Details   labetaloL (NORMODYNE) 300 mg tablet Take 1 Tablet by mouth every eight (8) hours. Qty: 90 Tablet, Refills: 1         CONTINUE these medications which have NOT CHANGED    Details   NIFEdipine ER (PROCARDIA XL) 90 mg ER tablet Take 1 Tablet by mouth daily.   Qty: 30 Tablet, Refills: 2      PNV Comb #2-Iron-FA-Omega 3 29-1-400 mg cmpk Take  by mouth.      loratadine-pseudoephedrine (Claritin-D 24 Hour)  mg per tablet Take 1 Tablet by mouth daily. Indications: seasonal runny nose         STOP taking these medications       aspirin 81 mg chewable tablet Comments:   Reason for Stopping:         Blood-Glucose Meter monitoring kit Comments:   Reason for Stopping:         glucose blood VI test strips (Advanced Gluc Meter Test Strip) strip Comments:   Reason for Stopping:               Disposition at Discharge: Home or self care    Condition at Discharge: Stable    Reference my discharge instructions. Follow-up Appointments   Procedures    FOLLOW UP VISIT Appointment in: One Week     Standing Status:   Standing     Number of Occurrences:   1     Order Specific Question:   Appointment in     Answer:    One Week        Signed By:  Estella Reynolds MD     January 2, 2022

## 2022-01-03 VITALS
OXYGEN SATURATION: 100 % | WEIGHT: 179 LBS | HEART RATE: 109 BPM | RESPIRATION RATE: 20 BRPM | DIASTOLIC BLOOD PRESSURE: 93 MMHG | BODY MASS INDEX: 33.79 KG/M2 | SYSTOLIC BLOOD PRESSURE: 148 MMHG | TEMPERATURE: 97.9 F | HEIGHT: 61 IN

## 2022-01-03 PROCEDURE — 74011250637 HC RX REV CODE- 250/637: Performed by: OBSTETRICS & GYNECOLOGY

## 2022-01-03 RX ORDER — NIFEDIPINE 30 MG/1
30 TABLET, EXTENDED RELEASE ORAL DAILY
Qty: 30 TABLET | Refills: 0 | Status: SHIPPED | OUTPATIENT
Start: 2022-01-03

## 2022-01-03 RX ORDER — NIFEDIPINE 30 MG/1
30 TABLET, EXTENDED RELEASE ORAL DAILY
Status: DISCONTINUED | OUTPATIENT
Start: 2022-01-03 | End: 2022-01-03 | Stop reason: HOSPADM

## 2022-01-03 RX ADMIN — NIFEDIPINE 30 MG: 30 TABLET, EXTENDED RELEASE ORAL at 11:00

## 2022-01-03 RX ADMIN — IBUPROFEN 800 MG: 400 TABLET, FILM COATED ORAL at 08:37

## 2022-01-03 RX ADMIN — IBUPROFEN 800 MG: 400 TABLET, FILM COATED ORAL at 00:25

## 2022-01-03 NOTE — PROGRESS NOTES
Post-Partum Day Number 2 Progress Note    Edith Carranza     Assessment: Doing well, post partum day 2    Plan:   - Discharge home today  - Follow up in office in 1 week(s) with [facility].  - Pain medication prescription(s) sent. - Questions answered. - SI PreE w/out SF: BP meds had been held, but BPs now mild range since last night. Will restart Procardia 30 XL today as patient does not like how she feels on Labetalol. She'll check her BPs 2-3 times daily at home and follow-up later this week for a BP check. Information for the patient's :  Savanna Garrett [[id number]]   Vaginal, Spontaneous      Subjective:  Patient doing well without significant complaint. Voiding without difficulty, normal lochia. Ready for discharge home. Vitals:  Visit Vitals  BP (!) 152/99 (BP 1 Location: Left upper arm, BP Patient Position: At rest;Sitting)   Pulse (!) 109   Temp 97.9 °F (36.6 °C)   Resp 20   Ht 5' 1\" (1.549 m)   Wt 81.2 kg (179 lb)   SpO2 100%   Breastfeeding Unknown   BMI 33.82 kg/m²     Temp (24hrs), Av.2 °F (36.8 °C), Min:97.9 °F (36.6 °C), Max:98.5 °F (36.9 °C)      Exam:        Patient without distress.                 Fundus firm, nontender per nursing fundal checks                Perineum with normal lochia noted per nursing assessment                Lower extremities are negative for pathological edema    Labs:     Lab Results   Component Value Date/Time    WBC 9.1 2021 04:59 PM    WBC 8.4 2021 04:05 PM    WBC 9.8 2021 06:10 AM    WBC 10.0 2021 03:31 AM    WBC 8.3 2021 10:52 AM    WBC 8.3 2021 06:02 AM    WBC 10.9 2021 11:17 PM    WBC 10.0 2021 11:39 AM    HGB 12.1 2021 04:59 PM    HGB 11.9 2021 04:05 PM    HGB 10.8 (L) 2021 06:10 AM    HGB 11.3 (L) 2021 03:31 AM    HGB 11.2 (L) 2021 10:52 AM    HGB 11.0 (L) 2021 06:02 AM    HGB 12.1 2021 11:17 PM    HGB 11.7 2021 11:39 AM HCT 35.6 12/30/2021 04:59 PM    HCT 35.9 12/08/2021 04:05 PM    HCT 32.2 (L) 11/30/2021 06:10 AM    HCT 34.5 (L) 11/23/2021 03:31 AM    HCT 34.4 (L) 11/16/2021 10:52 AM    HCT 32.8 (L) 11/07/2021 06:02 AM    HCT 35.9 11/05/2021 11:17 PM    HCT 34.9 (L) 11/02/2021 11:39 AM    PLATELET 647 22/85/8093 04:59 PM    PLATELET 973 (H) 71/49/7744 04:05 PM    PLATELET 724 21/36/7408 06:10 AM    PLATELET 170 53/22/6227 03:31 AM    PLATELET 956 01/12/9190 10:52 AM    PLATELET 196 52/84/4712 06:02 AM    PLATELET 541 65/16/1253 11:17 PM    PLATELET 621 40/86/8390 11:39 AM       No results found for this or any previous visit (from the past 24 hour(s)).

## 2022-01-03 NOTE — DISCHARGE SUMMARY
Obstetrical Discharge Summary     Name: Ramez Floyd MRN: [id number]  SSN: [id number]    YOB: 1985  Age: 31 y.o. Sex: female      Admit Date: 2021    Discharge Date: 1/3/2022     Admitting Physician: Lurdes Vidal MD     Attending Physician:  Michael Bruno MD     Admission Diagnoses: Preeclampsia [O14.90]  37 weeks gestation of pregnancy [Z3A.37]    Discharge Diagnoses:   Information for the patient's :  Magdaleno Jefferson [[id number]]   Delivery of a 2.82 kg male infant via Vaginal, Spontaneous on 2022 at 4:30 PM  by Brittney Mchugh. Apgars were 8  and 9 . Additional Diagnoses:   Hospital Problems  Date Reviewed: 2013          Codes Class Noted POA    Normal labor and delivery ICD-10-CM: O80  ICD-9-CM: 497  2022 Unknown        37 weeks gestation of pregnancy ICD-10-CM: Z3A.37  ICD-9-CM: V22.2  2021 Unknown        Preeclampsia ICD-10-CM: O14.90  ICD-9-CM: 642.40  2021 Unknown             Lab Results   Component Value Date/Time    Rubella, External immune 2021 12:00 AM    GrBStrep, External Negative 2021 12:00 AM       Hospital Course: Pt was admitted for IOL for SI Pre eclampsia (also with GDMA1). She had an uncomplicated  on . Her BP meds were held postpartum until PPD 2 since BPs became mild range. She was restarted on Procardia XL 30mg daily. She will monitor her BPs 2-3 times daily at home, but will need close follow-up. Patient Instructions:   Current Discharge Medication List      START taking these medications    Details   ibuprofen (MOTRIN) 800 mg tablet Take 1 Tablet by mouth every eight (8) hours as needed for Pain. Qty: 40 Tablet, Refills: 1         CONTINUE these medications which have CHANGED    Details   NIFEdipine ER (PROCARDIA XL) 30 mg ER tablet Take 1 Tablet by mouth daily.   Qty: 30 Tablet, Refills: 0         CONTINUE these medications which have NOT CHANGED    Details   PNV Comb #2-Iron-FA-Omega 3 29-1-400 mg cmpk Take  by mouth. STOP taking these medications       labetaloL (NORMODYNE) 200 mg tablet Comments:   Reason for Stopping:         aspirin 81 mg chewable tablet Comments:   Reason for Stopping:         Blood-Glucose Meter monitoring kit Comments:   Reason for Stopping:         glucose blood VI test strips (Advanced Gluc Meter Test Strip) strip Comments:   Reason for Stopping:         loratadine-pseudoephedrine (Claritin-D 24 Hour)  mg per tablet Comments:   Reason for Stopping:               Disposition at Discharge: Home or self care    Condition at Discharge: Stable    Reference my discharge instructions. Follow-up Appointments   Procedures    FOLLOW UP VISIT Appointment in: One Week     Standing Status:   Standing     Number of Occurrences:   1     Order Specific Question:   Appointment in     Answer: One Week        Signed By:  Jenn Faust MD     January 3, 2022                    .

## 2022-01-03 NOTE — PROGRESS NOTES
2000 SBAR report received from TEAGAN Charles RN.    [id number] Per orders, OB hospitalist called for mildly elevated pressures. SBAR format given.  Recommendation: continue to hold meds until primary provider could assess the need for continuation of medication

## 2022-01-03 NOTE — ROUTINE PROCESS
Bedside shift change report given to Maxine (oncoming nurse) by Oren Wu RN (offgoing nurse). Report included the following information SBAR.

## 2022-01-03 NOTE — DISCHARGE INSTRUCTIONS
POST DELIVERY DISCHARGE INSTRUCTIONS    Name: Vilma Sykes  YOB: 1985  Primary Diagnosis: Active Problems:    Preeclampsia (2021)      37 weeks gestation of pregnancy (2021)      Normal labor and delivery (2022)        General:     Diet/Diet Restrictions:  · Eight 8-ounce glasses of fluid daily (water, juices); avoid excessive caffeine intake. · Meals/snacks as desired which are high in fiber and carbohydrates and low in fat and cholesterol      Physical Activity / Restrictions / Safety:     · Avoid heavy lifting, no more that 8 lbs. For 2-3 weeks;   · Limit use of stairs to 2 times daily for the first week home. · No driving for one week. · Avoid intercourse 4-6 weeks, no douching or tampon use. · Check with obstetrician before starting or resuming an exercise program.      Discharge Instructions/Special Treatment/Home Care Needs:     · Continue prenatal vitamins. · Continue to use squirt bottle with warm water on your episiotomy after each bathroom use until bleeding stops. · If steri-strips applied to your incision, remove in 7-10 days. Call your doctor for the following:     · Fever over 101 degrees by mouth. · Vaginal bleeding heavier than a normal menstrual period or clots larger than a golf ball. · Red streaks or increased swelling of legs, painful red streaks on your breast.  · Painful urination, constipation and increased pain or swelling or discharge with your incision. · If you feel extremely anxious or overwhelmed. · If you have thoughts of harming yourself and/or your baby. Pain Management:     · Take Acetaminophen (Tylenol) or Ibuprofen (Advil, Motrin), as directed for pain. · Use a warm Sitz bath 3 times daily to relieve episiotomy or hemorrhoidal discomfort. · For hemorrhoidal discomfort, use Tucks and Anusol cream as needed and directed. · Heating pad to  incision as needed.      Follow-Up Care:     Appointment with MD:   Follow-up Appointments   Procedures    FOLLOW UP VISIT Appointment in: One Week     Standing Status:   Standing     Number of Occurrences:   1     Order Specific Question:   Appointment in     Answer: One Week     Telephone number: [phone]    Signed By: Uriel Wilson MD                                                                                                   Date: 1/2/2022 Time: 9:07 AM      Postpartum Support Groups (virtual)  We know that all of us are dealing with a tremendous amount of uncertainty, confusion and disruption to our daily lives, which may result in increased anxiety, depression and fear. If you are feeling unsettled or worse, please know that we are here to help. During this time of increased caution and care for one another, [facility] [place] ([address] [place] [address]) is offering virtual support groups to ALL MOTHERS in [place] regardless of the age of your child/children as a way to help weather this emotional storm together. Social support is an important part of self-care during this time of physical distancing. Virtual postpartum support group meetings available at www. [facility].org  Warm Line: 281.357.2238    Breastfeeding Support Groups (virtual)  1st and 3rd Wednesday of each month  2nd and 4th Tuesday of each month    [place] at www.[employer] under the \"About Us\" and \"Classes and Events tabs\"

## 2022-01-03 NOTE — LACTATION NOTE
This note was copied from a baby's chart. Mom has mostly been providing formula to infant per choice, rather than putting infant to the breast. Baby had initial low blood sugars, received glucose gel a couple of times. Mom states she wants to pump and provide formula until her milk comes in. She will call me for assistance with latching if she decides to put infant to the breast prior to discharge. Infant being circ'd this morning; discussed the potential impact of this on infants feedings at breast and encouraged mom to be diligent with waking infant for feeds. If mom continues to offer formula for feeds, I have suggested that she offer the breast first, then provide formula or EBM. Breasts may become engorged when milk \"comes in\". How milk is made / normal phases of milk production, supply and demand discussed. Taught care of engorged breasts - frequent breastfeeding encouraged and breast massage ac. Then nurse the baby (or pump minimally for comfort). Apply cold compresses ac and/or pc x 15 minutes a few times a day for swelling or discomfort. May need to do this care for a couple of days. Discussed prevention and treatment of mastitis.

## 2022-01-11 ENCOUNTER — TELEPHONE (OUTPATIENT)
Dept: MOTHER INFANT UNIT | Age: 37
End: 2022-01-11

## 2022-01-11 NOTE — TELEPHONE ENCOUNTER
Patient states she and the baby are doing well. She continues to pump and baby is receiving expressed breast milk and formula. She has see the lactation consultant at the pediatricians office and has no questions at this time.

## 2022-03-18 PROBLEM — O24.419 GESTATIONAL DIABETES: Status: ACTIVE | Noted: 2021-11-30

## 2022-03-19 PROBLEM — O14.90 PREECLAMPSIA: Status: ACTIVE | Noted: 2021-12-08

## 2022-03-19 PROBLEM — Z3A.37 37 WEEKS GESTATION OF PREGNANCY: Status: ACTIVE | Noted: 2021-12-30

## 2022-03-19 PROBLEM — O10.919 CHRONIC HYPERTENSION AFFECTING PREGNANCY: Status: ACTIVE | Noted: 2021-11-06

## 2022-03-19 PROBLEM — Z3A.29 29 WEEKS GESTATION OF PREGNANCY: Status: ACTIVE | Noted: 2021-11-06

## 2022-03-19 PROBLEM — O16.9 HYPERTENSION AFFECTING PREGNANCY: Status: ACTIVE | Noted: 2021-11-02

## 2022-03-19 PROBLEM — O11.9 CHRONIC HYPERTENSION WITH SUPERIMPOSED PRE-ECLAMPSIA: Status: ACTIVE | Noted: 2021-11-30

## 2023-05-26 RX ORDER — IBUPROFEN 800 MG/1
800 TABLET ORAL EVERY 8 HOURS PRN
COMMUNITY
Start: 2022-01-02

## 2023-05-26 RX ORDER — NIFEDIPINE 30 MG/1
30 TABLET, EXTENDED RELEASE ORAL DAILY
COMMUNITY
Start: 2022-01-03